# Patient Record
Sex: FEMALE | Race: WHITE | NOT HISPANIC OR LATINO | Employment: OTHER | URBAN - METROPOLITAN AREA
[De-identification: names, ages, dates, MRNs, and addresses within clinical notes are randomized per-mention and may not be internally consistent; named-entity substitution may affect disease eponyms.]

---

## 2017-01-02 ENCOUNTER — OFFICE VISIT - RIVER FALLS (OUTPATIENT)
Dept: FAMILY MEDICINE | Facility: CLINIC | Age: 52
End: 2017-01-02
Payer: COMMERCIAL

## 2017-01-02 ASSESSMENT — MIFFLIN-ST. JEOR: SCORE: 1207.08

## 2017-04-14 ENCOUNTER — OFFICE VISIT - RIVER FALLS (OUTPATIENT)
Dept: FAMILY MEDICINE | Facility: CLINIC | Age: 52
End: 2017-04-14
Payer: COMMERCIAL

## 2017-04-14 ASSESSMENT — MIFFLIN-ST. JEOR: SCORE: 1215.24

## 2018-04-23 ENCOUNTER — OFFICE VISIT - RIVER FALLS (OUTPATIENT)
Dept: FAMILY MEDICINE | Facility: CLINIC | Age: 53
End: 2018-04-23
Payer: COMMERCIAL

## 2018-04-23 ASSESSMENT — MIFFLIN-ST. JEOR: SCORE: 1246.99

## 2018-08-15 ENCOUNTER — OFFICE VISIT - RIVER FALLS (OUTPATIENT)
Dept: FAMILY MEDICINE | Facility: CLINIC | Age: 53
End: 2018-08-15
Payer: COMMERCIAL

## 2018-08-15 ASSESSMENT — MIFFLIN-ST. JEOR: SCORE: 1243.36

## 2018-08-18 ENCOUNTER — TRANSFERRED RECORDS (OUTPATIENT)
Dept: HEALTH INFORMATION MANAGEMENT | Facility: CLINIC | Age: 53
End: 2018-08-18

## 2018-08-18 LAB — HPV ABSTRACT: NORMAL

## 2018-09-04 ENCOUNTER — OFFICE VISIT - RIVER FALLS (OUTPATIENT)
Dept: FAMILY MEDICINE | Facility: CLINIC | Age: 53
End: 2018-09-04
Payer: COMMERCIAL

## 2018-09-04 ASSESSMENT — MIFFLIN-ST. JEOR: SCORE: 1247.9

## 2018-12-17 ENCOUNTER — OFFICE VISIT - RIVER FALLS (OUTPATIENT)
Dept: FAMILY MEDICINE | Facility: CLINIC | Age: 53
End: 2018-12-17
Payer: COMMERCIAL

## 2019-01-14 ENCOUNTER — OFFICE VISIT - RIVER FALLS (OUTPATIENT)
Dept: FAMILY MEDICINE | Facility: CLINIC | Age: 54
End: 2019-01-14
Payer: COMMERCIAL

## 2019-01-14 LAB
ALBUMIN UR-MCNC: NEGATIVE G/DL
APPEARANCE UR: CLEAR
BILIRUB UR QL STRIP: NEGATIVE
COLOR UR AUTO: YELLOW
GLUCOSE UR STRIP-MCNC: NEGATIVE MG/DL
HGB UR QL STRIP: NEGATIVE
KETONES UR STRIP-MCNC: NEGATIVE MG/DL
LEUKOCYTE ESTERASE UR QL STRIP: NEGATIVE
NITRATE UR QL: NEGATIVE
PH UR STRIP: 6 [PH]
SP GR UR STRIP: 1.02
UROBILINOGEN UR STRIP-MCNC: NORMAL MG/DL

## 2019-01-14 ASSESSMENT — MIFFLIN-ST. JEOR: SCORE: 1261.51

## 2019-02-07 ENCOUNTER — AMBULATORY - RIVER FALLS (OUTPATIENT)
Dept: FAMILY MEDICINE | Facility: CLINIC | Age: 54
End: 2019-02-07
Payer: COMMERCIAL

## 2019-06-04 ENCOUNTER — OFFICE VISIT - RIVER FALLS (OUTPATIENT)
Dept: FAMILY MEDICINE | Facility: CLINIC | Age: 54
End: 2019-06-04
Payer: COMMERCIAL

## 2019-06-04 LAB — DEPRECATED S PYO AG THROAT QL EIA: NEGATIVE

## 2019-06-04 ASSESSMENT — MIFFLIN-ST. JEOR: SCORE: 1247.9

## 2019-06-06 LAB — BACTERIA SPEC CULT: NORMAL

## 2019-06-21 ENCOUNTER — AMBULATORY - RIVER FALLS (OUTPATIENT)
Dept: FAMILY MEDICINE | Facility: CLINIC | Age: 54
End: 2019-06-21
Payer: COMMERCIAL

## 2019-07-06 ENCOUNTER — OFFICE VISIT - RIVER FALLS (OUTPATIENT)
Dept: FAMILY MEDICINE | Facility: CLINIC | Age: 54
End: 2019-07-06
Payer: COMMERCIAL

## 2019-08-19 ENCOUNTER — OFFICE VISIT - RIVER FALLS (OUTPATIENT)
Dept: FAMILY MEDICINE | Facility: CLINIC | Age: 54
End: 2019-08-19
Payer: COMMERCIAL

## 2019-08-19 ASSESSMENT — MIFFLIN-ST. JEOR: SCORE: 1247.9

## 2019-09-09 ENCOUNTER — OFFICE VISIT - RIVER FALLS (OUTPATIENT)
Dept: FAMILY MEDICINE | Facility: CLINIC | Age: 54
End: 2019-09-09
Payer: COMMERCIAL

## 2019-09-09 ASSESSMENT — MIFFLIN-ST. JEOR: SCORE: 1246.99

## 2020-01-29 ENCOUNTER — COMMUNICATION - RIVER FALLS (OUTPATIENT)
Dept: FAMILY MEDICINE | Facility: CLINIC | Age: 55
End: 2020-01-29
Payer: COMMERCIAL

## 2020-01-29 ENCOUNTER — OFFICE VISIT - RIVER FALLS (OUTPATIENT)
Dept: FAMILY MEDICINE | Facility: CLINIC | Age: 55
End: 2020-01-29
Payer: COMMERCIAL

## 2020-01-29 LAB — DEPRECATED S PYO AG THROAT QL EIA: NOT DETECTED

## 2020-01-29 ASSESSMENT — MIFFLIN-ST. JEOR: SCORE: 1265.14

## 2020-02-18 ENCOUNTER — OFFICE VISIT - RIVER FALLS (OUTPATIENT)
Dept: FAMILY MEDICINE | Facility: CLINIC | Age: 55
End: 2020-02-18
Payer: COMMERCIAL

## 2020-02-18 ASSESSMENT — MIFFLIN-ST. JEOR: SCORE: 1256.07

## 2020-02-19 ENCOUNTER — COMMUNICATION - RIVER FALLS (OUTPATIENT)
Dept: FAMILY MEDICINE | Facility: CLINIC | Age: 55
End: 2020-02-19
Payer: COMMERCIAL

## 2020-04-28 ENCOUNTER — AMBULATORY - RIVER FALLS (OUTPATIENT)
Dept: FAMILY MEDICINE | Facility: CLINIC | Age: 55
End: 2020-04-28
Payer: COMMERCIAL

## 2020-04-28 ENCOUNTER — OFFICE VISIT - RIVER FALLS (OUTPATIENT)
Dept: FAMILY MEDICINE | Facility: CLINIC | Age: 55
End: 2020-04-28
Payer: COMMERCIAL

## 2020-04-30 ENCOUNTER — COMMUNICATION - RIVER FALLS (OUTPATIENT)
Dept: FAMILY MEDICINE | Facility: CLINIC | Age: 55
End: 2020-04-30
Payer: COMMERCIAL

## 2020-04-30 LAB
ALBUMIN UR-MCNC: NEGATIVE G/DL
APPEARANCE UR: CLEAR
BACTERIA #/AREA URNS HPF: NORMAL /HPF
BACTERIA SPEC CULT: NORMAL
BILIRUB UR QL STRIP: NEGATIVE
COLOR UR AUTO: YELLOW
GLUCOSE UR STRIP-MCNC: NEGATIVE MG/DL
HGB UR QL STRIP: NEGATIVE
HYALINE CASTS #/AREA URNS LPF: NORMAL /LPF
KETONES UR STRIP-MCNC: NEGATIVE MG/DL
LEUKOCYTE ESTERASE UR QL STRIP: NEGATIVE
NITRATE UR QL: NEGATIVE
PH UR STRIP: 5.5 [PH] (ref 5–8)
RBC #/AREA URNS AUTO: NORMAL /HPF
SP GR UR STRIP: 1 (ref 1–1.03)
SQUAMOUS #/AREA URNS AUTO: NORMAL /HPF
WBC #/AREA URNS AUTO: NORMAL /HPF

## 2020-06-15 ENCOUNTER — OFFICE VISIT - RIVER FALLS (OUTPATIENT)
Dept: FAMILY MEDICINE | Facility: CLINIC | Age: 55
End: 2020-06-15
Payer: COMMERCIAL

## 2020-09-01 ENCOUNTER — OFFICE VISIT - RIVER FALLS (OUTPATIENT)
Dept: FAMILY MEDICINE | Facility: CLINIC | Age: 55
End: 2020-09-01
Payer: COMMERCIAL

## 2020-09-09 ENCOUNTER — OFFICE VISIT - RIVER FALLS (OUTPATIENT)
Dept: FAMILY MEDICINE | Facility: CLINIC | Age: 55
End: 2020-09-09
Payer: COMMERCIAL

## 2020-09-09 ENCOUNTER — AMBULATORY - RIVER FALLS (OUTPATIENT)
Dept: FAMILY MEDICINE | Facility: CLINIC | Age: 55
End: 2020-09-09
Payer: COMMERCIAL

## 2020-09-11 ENCOUNTER — COMMUNICATION - RIVER FALLS (OUTPATIENT)
Dept: FAMILY MEDICINE | Facility: CLINIC | Age: 55
End: 2020-09-11
Payer: COMMERCIAL

## 2020-09-15 ENCOUNTER — AMBULATORY - RIVER FALLS (OUTPATIENT)
Dept: FAMILY MEDICINE | Facility: CLINIC | Age: 55
End: 2020-09-15
Payer: COMMERCIAL

## 2020-09-16 ENCOUNTER — COMMUNICATION - RIVER FALLS (OUTPATIENT)
Dept: FAMILY MEDICINE | Facility: CLINIC | Age: 55
End: 2020-09-16
Payer: COMMERCIAL

## 2020-09-16 LAB
ERYTHROCYTE [DISTWIDTH] IN BLOOD BY AUTOMATED COUNT: 12.7 % (ref 11–15)
HCT VFR BLD AUTO: 38.9 % (ref 35–45)
HGB BLD-MCNC: 12.9 GM/DL (ref 11.7–15.5)
MCH RBC QN AUTO: 29.2 PG (ref 27–33)
MCHC RBC AUTO-ENTMCNC: 33.2 GM/DL (ref 32–36)
MCV RBC AUTO: 88 FL (ref 80–100)
PLATELET # BLD AUTO: 264 10*3/UL (ref 140–400)
PMV BLD: 10.1 FL (ref 7.5–12.5)
RBC # BLD AUTO: 4.42 10*6/UL (ref 3.8–5.1)
TSH SERPL DL<=0.005 MIU/L-ACNC: 1.66 MIU/L (ref 0.4–4.5)
WBC # BLD AUTO: 6.2 10*3/UL (ref 3.8–10.8)

## 2020-10-19 ENCOUNTER — OFFICE VISIT - RIVER FALLS (OUTPATIENT)
Dept: FAMILY MEDICINE | Facility: CLINIC | Age: 55
End: 2020-10-19
Payer: COMMERCIAL

## 2020-10-19 ASSESSMENT — MIFFLIN-ST. JEOR: SCORE: 1237.92

## 2020-12-08 ENCOUNTER — OFFICE VISIT - RIVER FALLS (OUTPATIENT)
Dept: FAMILY MEDICINE | Facility: CLINIC | Age: 55
End: 2020-12-08
Payer: COMMERCIAL

## 2020-12-08 ENCOUNTER — AMBULATORY - RIVER FALLS (OUTPATIENT)
Dept: FAMILY MEDICINE | Facility: CLINIC | Age: 55
End: 2020-12-08
Payer: COMMERCIAL

## 2020-12-11 LAB — SARS-COV-2 RNA RESP QL NAA+PROBE: NEGATIVE

## 2021-02-19 ENCOUNTER — OFFICE VISIT - RIVER FALLS (OUTPATIENT)
Dept: FAMILY MEDICINE | Facility: CLINIC | Age: 56
End: 2021-02-19
Payer: COMMERCIAL

## 2021-02-19 ASSESSMENT — MIFFLIN-ST. JEOR: SCORE: 1237.92

## 2021-03-01 ENCOUNTER — OFFICE VISIT - RIVER FALLS (OUTPATIENT)
Dept: FAMILY MEDICINE | Facility: CLINIC | Age: 56
End: 2021-03-01
Payer: COMMERCIAL

## 2021-03-01 ENCOUNTER — AMBULATORY - RIVER FALLS (OUTPATIENT)
Dept: FAMILY MEDICINE | Facility: CLINIC | Age: 56
End: 2021-03-01
Payer: COMMERCIAL

## 2021-03-01 LAB — DEPRECATED S PYO AG THROAT QL EIA: NOT DETECTED

## 2021-03-02 ENCOUNTER — AMBULATORY - HEALTHEAST (OUTPATIENT)
Dept: OTOLARYNGOLOGY | Facility: TELEHEALTH | Age: 56
End: 2021-03-02

## 2021-03-02 DIAGNOSIS — R09.A2 FOREIGN BODY SENSATION IN THROAT: ICD-10-CM

## 2021-03-03 LAB — SARS-COV-2 RNA RESP QL NAA+PROBE: NEGATIVE

## 2021-03-12 ENCOUNTER — OFFICE VISIT - RIVER FALLS (OUTPATIENT)
Dept: FAMILY MEDICINE | Facility: CLINIC | Age: 56
End: 2021-03-12
Payer: COMMERCIAL

## 2021-03-12 ENCOUNTER — OFFICE VISIT - HEALTHEAST (OUTPATIENT)
Dept: OTOLARYNGOLOGY | Facility: TELEHEALTH | Age: 56
End: 2021-03-12

## 2021-03-12 DIAGNOSIS — K21.9 GASTROESOPHAGEAL REFLUX DISEASE, UNSPECIFIED WHETHER ESOPHAGITIS PRESENT: ICD-10-CM

## 2021-03-23 ENCOUNTER — AMBULATORY - RIVER FALLS (OUTPATIENT)
Dept: FAMILY MEDICINE | Facility: CLINIC | Age: 56
End: 2021-03-23
Payer: COMMERCIAL

## 2021-03-24 ENCOUNTER — COMMUNICATION - RIVER FALLS (OUTPATIENT)
Dept: FAMILY MEDICINE | Facility: CLINIC | Age: 56
End: 2021-03-24
Payer: COMMERCIAL

## 2021-03-24 LAB
BUN SERPL-MCNC: 16 MG/DL (ref 7–25)
BUN/CREAT RATIO - HISTORICAL: NORMAL (ref 6–22)
CALCIUM SERPL-MCNC: 10.1 MG/DL (ref 8.6–10.4)
CHLORIDE BLD-SCNC: 101 MMOL/L (ref 98–110)
CHOLEST SERPL-MCNC: 311 MG/DL
CHOLEST/HDLC SERPL: 4.1 {RATIO}
CO2 SERPL-SCNC: 29 MMOL/L (ref 20–32)
CREAT SERPL-MCNC: 0.82 MG/DL (ref 0.5–1.05)
EGFRCR SERPLBLD CKD-EPI 2021: 80 ML/MIN/1.73M2
ERYTHROCYTE [DISTWIDTH] IN BLOOD BY AUTOMATED COUNT: 14.8 % (ref 11–15)
GLUCOSE BLD-MCNC: 97 MG/DL (ref 65–99)
HCT VFR BLD AUTO: 41.8 % (ref 35–45)
HDLC SERPL-MCNC: 76 MG/DL
HGB BLD-MCNC: 13.7 GM/DL (ref 11.7–15.5)
LDLC SERPL CALC-MCNC: 203 MG/DL
MCH RBC QN AUTO: 29 PG (ref 27–33)
MCHC RBC AUTO-ENTMCNC: 32.8 GM/DL (ref 32–36)
MCV RBC AUTO: 88.4 FL (ref 80–100)
NONHDLC SERPL-MCNC: 235 MG/DL
PLATELET # BLD AUTO: 282 10*3/UL (ref 140–400)
PMV BLD: 9.7 FL (ref 7.5–12.5)
POTASSIUM BLD-SCNC: 4.2 MMOL/L (ref 3.5–5.3)
RBC # BLD AUTO: 4.73 10*6/UL (ref 3.8–5.1)
SODIUM SERPL-SCNC: 136 MMOL/L (ref 135–146)
TRIGL SERPL-MCNC: 152 MG/DL
WBC # BLD AUTO: 5.2 10*3/UL (ref 3.8–10.8)

## 2021-03-29 ENCOUNTER — OFFICE VISIT - RIVER FALLS (OUTPATIENT)
Dept: FAMILY MEDICINE | Facility: CLINIC | Age: 56
End: 2021-03-29
Payer: COMMERCIAL

## 2021-03-29 ASSESSMENT — MIFFLIN-ST. JEOR: SCORE: 1256.63

## 2021-03-30 ENCOUNTER — RECORDS - HEALTHEAST (OUTPATIENT)
Dept: ADMINISTRATIVE | Facility: OTHER | Age: 56
End: 2021-03-30

## 2021-04-14 ENCOUNTER — HOSPITAL ENCOUNTER (OUTPATIENT)
Dept: CT IMAGING | Facility: CLINIC | Age: 56
Discharge: HOME | End: 2021-04-14

## 2021-04-14 DIAGNOSIS — E78.5 HYPERLIPIDEMIA: ICD-10-CM

## 2021-04-15 LAB
CV CALCIUM SCORE AGATSTON LM: 0
CV CALCIUM SCORING AGATSON LAD: 0
CV CALCIUM SCORING AGATSTON CX: 0
CV CALCIUM SCORING AGATSTON RCA: 0
CV CALCIUM SCORING AGATSTON TOTAL: 0

## 2021-05-27 VITALS — TEMPERATURE: 97.1 F

## 2021-06-15 NOTE — PROGRESS NOTES
HISTORY OF PRESENT ILLNESS  Asked to see by Dr. Yu for evaluation of sensation in the throat. Patient reports that she had a head cold. She felt like something stuck in the throat. Her face would turn beet red. She tried taking allergy medications without help. Benadryl cream helped the face. Her head cold is better but she still feels like there is something in her throat. She points to the center of the anterior neck in the area of the voice box as the location of the discomfort. She is swallowing normally. Patient does reports occasional heartburn. She does takes TUMS on occasion.    REVIEW OF SYSTEMS  Review of Systems: a 10-system review was performed. Pertinent positives are noted in the HPI and on a separate scanned document in the chart.    PMH, PSH, FH and SH has documented in the EHR.      EXAM    CONSTITUTIONAL  General Appearance:  Normal, well developed, well nourished, no obvious distress  Ability to Communicate:  communicates appropriately.    HEAD AND FACE  Appearance and Symmetry:  Normal, no scalp or facial scarring or suspicious lesions.  Paranasal sinuses tenderness:  Normal, Paranasal sinuses non tender    EYE  Normal external eye, conjunctiva, lids, cornea.     EARS  Clinical speech reception threshold:  Normal, able to hear normal speech.  Auricle:  Normal, Auricles without scars, lesions, masses.  External auditory canal:  Normal, External auditory canal normal.  Tympanic membrane:  Normal, Tympanic membranes normal without swelling or erythema.  Tympanic membrane mobility:  Normal, Normal tympanic membrane mobility.    NOSE (speculum or scope)  Architecture:  Normal, Grossly normal external nasal architecture with no masses or lesions.  Mucosa:  Normal mucosa, No polyps or masses.  Septum:  Normal, Septum non-obstructing.  Turbinates:  Normal, No turbinate abnormalities    ORAL CAVITY AND OROPHARYNX  Lips:  Normal.  Dental and gingiva:  Normal, No obvious dental or gingival  disease.  Mucosa:  Normal, Moist mucous membranes.  Tongue:  Normal, Tongue mobile with no mucosal abnormalities  Hard and soft palate:  Normal, Hard and soft palate without cleft or mucosal lesions.  Oral pharynx:  Normal, Posterior pharynx without lesions or remarkable asymmetry. Lymphoid follicle left posterior pharyngeal wall.  Saliva:  Normal, Clear saliva.  Masses:  Normal, No palpable masses or pathologically enlarged lymph nodes.    LARYNGOSCOPY  Risks and benefit of Flexible laryngeal endoscopy were discussed with the patient and they wished to proceed.  The nose was sprayed with 4% lidocaine / 1% phenylephrine.  After allowing adequate time for anesthesia the procedure was started.  Patient tolerated the procedure well.  See exam note for findings.    LARYNX AND HYPOPHARYNX (scope)  Voice Quality:  Normal voice quality.  Supra glottis:  Normal, No edema or erythema.  Epiglottis:  Normal, No epiglottic mass or mucosal lesions.  Pyriform sinuses:  Normal, Pyriform sinuses clear.  Vocal cords appearance:  Normal, Vocal cord motion symmetric.  Vocal cord motion:  Normal, Vocal cord motion symmetric  Endolarynx:  Normal, No Endolaryngeal mass or mucosal lesions.    NECK  Masses/lymph nodes:  Normal, No worrisome neck masses or lymph nodes.  Salivary glands:  Normal, Parotid and submandibular glands.  Trachea and larynx position:  Normal, Trachea and larynx midline.  Thyroid:  Normal, No thyroid abnormality.  Tenderness:  Normal, No cervical tenderness.  Suppleness:  Normal, Neck supple    CARDIOVASCULAR  Regular rate and rhythm.  No cyanosis, clubbing or edema.    PULSES  Carotid pulses normal    NEUROLOGICAL  Speech pattern:  Normal, Proasaic    RESPIRATORY  Symmetry and Respiratory effort:  Normal, Symmetric chest movement and expansion with no increased intercostal retractions or use of accessory muscles.     IMPRESSION  No evidence of mass or infection. It is possible that she may be experiencing  GERD.    RECOMMENDATION  At this point I provided reassurance. Consider otc omeprazole if symptoms worsen.    Ke Mullins MD

## 2021-08-10 ENCOUNTER — COMMUNICATION - RIVER FALLS (OUTPATIENT)
Dept: FAMILY MEDICINE | Facility: CLINIC | Age: 56
End: 2021-08-10
Payer: COMMERCIAL

## 2021-08-11 ENCOUNTER — AMBULATORY - RIVER FALLS (OUTPATIENT)
Dept: FAMILY MEDICINE | Facility: CLINIC | Age: 56
End: 2021-08-11
Payer: COMMERCIAL

## 2021-09-27 ENCOUNTER — OFFICE VISIT - RIVER FALLS (OUTPATIENT)
Dept: FAMILY MEDICINE | Facility: CLINIC | Age: 56
End: 2021-09-27
Payer: COMMERCIAL

## 2021-09-27 ASSESSMENT — MIFFLIN-ST. JEOR: SCORE: 1254.36

## 2021-09-30 ENCOUNTER — AMBULATORY - RIVER FALLS (OUTPATIENT)
Dept: FAMILY MEDICINE | Facility: CLINIC | Age: 56
End: 2021-09-30
Payer: COMMERCIAL

## 2021-09-30 ENCOUNTER — LAB REQUISITION (OUTPATIENT)
Dept: LAB | Facility: CLINIC | Age: 56
End: 2021-09-30
Payer: COMMERCIAL

## 2021-09-30 DIAGNOSIS — U07.1 COVID-19: ICD-10-CM

## 2021-09-30 PROCEDURE — U0003 INFECTIOUS AGENT DETECTION BY NUCLEIC ACID (DNA OR RNA); SEVERE ACUTE RESPIRATORY SYNDROME CORONAVIRUS 2 (SARS-COV-2) (CORONAVIRUS DISEASE [COVID-19]), AMPLIFIED PROBE TECHNIQUE, MAKING USE OF HIGH THROUGHPUT TECHNOLOGIES AS DESCRIBED BY CMS-2020-01-R: HCPCS | Mod: ORL | Performed by: FAMILY MEDICINE

## 2021-10-03 LAB — SARS-COV-2 RNA RESP QL NAA+PROBE: NOT DETECTED

## 2021-10-05 LAB — SARS-COV-2 RNA RESP QL NAA+PROBE: NEGATIVE

## 2021-10-25 ENCOUNTER — AMBULATORY - RIVER FALLS (OUTPATIENT)
Dept: FAMILY MEDICINE | Facility: CLINIC | Age: 56
End: 2021-10-25
Payer: COMMERCIAL

## 2021-10-26 ENCOUNTER — COMMUNICATION - RIVER FALLS (OUTPATIENT)
Dept: FAMILY MEDICINE | Facility: CLINIC | Age: 56
End: 2021-10-26
Payer: COMMERCIAL

## 2021-10-26 LAB
CHOLEST SERPL-MCNC: 323 MG/DL
CHOLEST/HDLC SERPL: 4.7 {RATIO}
HDLC SERPL-MCNC: 69 MG/DL
LDLC SERPL CALC-MCNC: 226 MG/DL
NONHDLC SERPL-MCNC: 254 MG/DL
TRIGL SERPL-MCNC: 133 MG/DL

## 2021-11-11 ENCOUNTER — AMBULATORY - RIVER FALLS (OUTPATIENT)
Dept: FAMILY MEDICINE | Facility: CLINIC | Age: 56
End: 2021-11-11
Payer: COMMERCIAL

## 2021-11-11 ENCOUNTER — LAB REQUISITION (OUTPATIENT)
Dept: LAB | Facility: CLINIC | Age: 56
End: 2021-11-11
Payer: COMMERCIAL

## 2021-11-11 ENCOUNTER — OFFICE VISIT - RIVER FALLS (OUTPATIENT)
Dept: FAMILY MEDICINE | Facility: CLINIC | Age: 56
End: 2021-11-11
Payer: COMMERCIAL

## 2021-11-11 DIAGNOSIS — U07.1 COVID-19: ICD-10-CM

## 2021-11-11 PROCEDURE — U0003 INFECTIOUS AGENT DETECTION BY NUCLEIC ACID (DNA OR RNA); SEVERE ACUTE RESPIRATORY SYNDROME CORONAVIRUS 2 (SARS-COV-2) (CORONAVIRUS DISEASE [COVID-19]), AMPLIFIED PROBE TECHNIQUE, MAKING USE OF HIGH THROUGHPUT TECHNOLOGIES AS DESCRIBED BY CMS-2020-01-R: HCPCS | Mod: ORL | Performed by: INTERNAL MEDICINE

## 2021-11-13 LAB — SARS-COV-2 RNA RESP QL NAA+PROBE: NOT DETECTED

## 2021-11-14 ENCOUNTER — TELEPHONE (OUTPATIENT)
Dept: NURSING | Facility: CLINIC | Age: 56
End: 2021-11-14
Payer: COMMERCIAL

## 2021-11-14 NOTE — TELEPHONE ENCOUNTER

## 2021-11-15 LAB — SARS-COV-2 RNA RESP QL NAA+PROBE: NEGATIVE

## 2021-11-27 ENCOUNTER — OFFICE VISIT - RIVER FALLS (OUTPATIENT)
Dept: FAMILY MEDICINE | Facility: CLINIC | Age: 56
End: 2021-11-27
Payer: COMMERCIAL

## 2021-11-27 ASSESSMENT — MIFFLIN-ST. JEOR: SCORE: 1233.5

## 2021-12-15 ENCOUNTER — OFFICE VISIT - RIVER FALLS (OUTPATIENT)
Dept: FAMILY MEDICINE | Facility: CLINIC | Age: 56
End: 2021-12-15
Payer: COMMERCIAL

## 2021-12-15 ASSESSMENT — MIFFLIN-ST. JEOR: SCORE: 1233.5

## 2021-12-20 ENCOUNTER — OFFICE VISIT - RIVER FALLS (OUTPATIENT)
Dept: FAMILY MEDICINE | Facility: CLINIC | Age: 56
End: 2021-12-20
Payer: COMMERCIAL

## 2021-12-22 ENCOUNTER — AMBULATORY - RIVER FALLS (OUTPATIENT)
Dept: FAMILY MEDICINE | Facility: CLINIC | Age: 56
End: 2021-12-22
Payer: COMMERCIAL

## 2021-12-22 ENCOUNTER — LAB REQUISITION (OUTPATIENT)
Dept: LAB | Facility: CLINIC | Age: 56
End: 2021-12-22
Payer: COMMERCIAL

## 2021-12-22 DIAGNOSIS — U07.1 COVID-19: ICD-10-CM

## 2021-12-22 PROCEDURE — U0005 INFEC AGEN DETEC AMPLI PROBE: HCPCS | Mod: ORL | Performed by: FAMILY MEDICINE

## 2021-12-23 LAB
SARS-COV-2 RNA RESP QL NAA+PROBE: NEGATIVE
SARS-COV-2 RNA RESP QL NAA+PROBE: NEGATIVE

## 2022-02-11 VITALS
HEIGHT: 63 IN | DIASTOLIC BLOOD PRESSURE: 60 MMHG | BODY MASS INDEX: 27.2 KG/M2 | HEART RATE: 64 BPM | WEIGHT: 153.5 LBS | TEMPERATURE: 97 F | SYSTOLIC BLOOD PRESSURE: 122 MMHG

## 2022-02-11 VITALS
SYSTOLIC BLOOD PRESSURE: 118 MMHG | WEIGHT: 149.2 LBS | HEIGHT: 62 IN | HEART RATE: 68 BPM | TEMPERATURE: 97.2 F | BODY MASS INDEX: 27.46 KG/M2 | DIASTOLIC BLOOD PRESSURE: 68 MMHG

## 2022-02-11 VITALS
SYSTOLIC BLOOD PRESSURE: 118 MMHG | HEART RATE: 66 BPM | HEART RATE: 64 BPM | TEMPERATURE: 96.4 F | WEIGHT: 161.2 LBS | SYSTOLIC BLOOD PRESSURE: 100 MMHG | DIASTOLIC BLOOD PRESSURE: 60 MMHG | BODY MASS INDEX: 29.66 KG/M2 | DIASTOLIC BLOOD PRESSURE: 70 MMHG | WEIGHT: 160.6 LBS | BODY MASS INDEX: 29.85 KG/M2 | TEMPERATURE: 96.7 F | HEIGHT: 62 IN

## 2022-02-11 VITALS
BODY MASS INDEX: 29.44 KG/M2 | WEIGHT: 160 LBS | HEIGHT: 62 IN | SYSTOLIC BLOOD PRESSURE: 126 MMHG | OXYGEN SATURATION: 99 % | WEIGHT: 162 LBS | SYSTOLIC BLOOD PRESSURE: 102 MMHG | HEART RATE: 70 BPM | DIASTOLIC BLOOD PRESSURE: 72 MMHG | BODY MASS INDEX: 29.81 KG/M2 | HEIGHT: 62 IN | TEMPERATURE: 97.7 F | DIASTOLIC BLOOD PRESSURE: 58 MMHG | HEART RATE: 68 BPM | TEMPERATURE: 97.1 F

## 2022-02-11 VITALS
TEMPERATURE: 98 F | BODY MASS INDEX: 27.79 KG/M2 | OXYGEN SATURATION: 97 % | HEIGHT: 62 IN | WEIGHT: 151 LBS | DIASTOLIC BLOOD PRESSURE: 66 MMHG | HEART RATE: 80 BPM | SYSTOLIC BLOOD PRESSURE: 108 MMHG

## 2022-02-11 VITALS
DIASTOLIC BLOOD PRESSURE: 72 MMHG | OXYGEN SATURATION: 99 % | DIASTOLIC BLOOD PRESSURE: 60 MMHG | OXYGEN SATURATION: 99 % | SYSTOLIC BLOOD PRESSURE: 102 MMHG | WEIGHT: 148.9 LBS | HEIGHT: 63 IN | BODY MASS INDEX: 27.06 KG/M2 | HEIGHT: 63 IN | BODY MASS INDEX: 26.17 KG/M2 | BODY MASS INDEX: 26.38 KG/M2 | HEIGHT: 63 IN | WEIGHT: 148.9 LBS | SYSTOLIC BLOOD PRESSURE: 110 MMHG | HEART RATE: 68 BPM | BODY MASS INDEX: 26.38 KG/M2 | HEART RATE: 62 BPM | HEIGHT: 63 IN | TEMPERATURE: 97.3 F

## 2022-02-11 VITALS
DIASTOLIC BLOOD PRESSURE: 70 MMHG | SYSTOLIC BLOOD PRESSURE: 110 MMHG | WEIGHT: 157.2 LBS | BODY MASS INDEX: 28.93 KG/M2 | HEIGHT: 62 IN | TEMPERATURE: 98 F | HEART RATE: 66 BPM

## 2022-02-11 VITALS
HEIGHT: 62 IN | BODY MASS INDEX: 28.71 KG/M2 | TEMPERATURE: 96.3 F | BODY MASS INDEX: 27.29 KG/M2 | WEIGHT: 154 LBS | HEIGHT: 63 IN | DIASTOLIC BLOOD PRESSURE: 74 MMHG | SYSTOLIC BLOOD PRESSURE: 99 MMHG | OXYGEN SATURATION: 100 % | DIASTOLIC BLOOD PRESSURE: 66 MMHG | HEART RATE: 60 BPM | SYSTOLIC BLOOD PRESSURE: 100 MMHG | TEMPERATURE: 97 F | WEIGHT: 156 LBS | HEART RATE: 66 BPM

## 2022-02-11 VITALS
SYSTOLIC BLOOD PRESSURE: 102 MMHG | DIASTOLIC BLOOD PRESSURE: 74 MMHG | BODY MASS INDEX: 29.11 KG/M2 | HEART RATE: 68 BPM | WEIGHT: 158.2 LBS | SYSTOLIC BLOOD PRESSURE: 126 MMHG | HEIGHT: 62 IN | HEART RATE: 76 BPM | DIASTOLIC BLOOD PRESSURE: 80 MMHG | HEIGHT: 62 IN | SYSTOLIC BLOOD PRESSURE: 120 MMHG | DIASTOLIC BLOOD PRESSURE: 60 MMHG | WEIGHT: 158.2 LBS | HEART RATE: 82 BPM | OXYGEN SATURATION: 98 % | BODY MASS INDEX: 29.11 KG/M2 | TEMPERATURE: 98 F

## 2022-02-11 VITALS
WEIGHT: 158.2 LBS | HEART RATE: 78 BPM | HEIGHT: 62 IN | BODY MASS INDEX: 29.11 KG/M2 | SYSTOLIC BLOOD PRESSURE: 118 MMHG | DIASTOLIC BLOOD PRESSURE: 66 MMHG | TEMPERATURE: 97 F

## 2022-02-11 VITALS
HEIGHT: 62 IN | SYSTOLIC BLOOD PRESSURE: 112 MMHG | HEIGHT: 62 IN | HEART RATE: 79 BPM | BODY MASS INDEX: 29.74 KG/M2 | TEMPERATURE: 96.8 F | BODY MASS INDEX: 28.71 KG/M2 | BODY MASS INDEX: 29.74 KG/M2 | HEIGHT: 62 IN | WEIGHT: 156 LBS | DIASTOLIC BLOOD PRESSURE: 84 MMHG

## 2022-02-11 VITALS
TEMPERATURE: 96.6 F | WEIGHT: 158 LBS | HEIGHT: 62 IN | SYSTOLIC BLOOD PRESSURE: 108 MMHG | HEART RATE: 72 BPM | DIASTOLIC BLOOD PRESSURE: 56 MMHG | BODY MASS INDEX: 29.08 KG/M2

## 2022-02-11 VITALS
WEIGHT: 158 LBS | BODY MASS INDEX: 29.08 KG/M2 | HEART RATE: 73 BPM | TEMPERATURE: 97.5 F | OXYGEN SATURATION: 99 % | HEIGHT: 62 IN

## 2022-02-11 VITALS — HEIGHT: 62 IN | BODY MASS INDEX: 29.41 KG/M2

## 2022-02-11 VITALS — BODY MASS INDEX: 29.41 KG/M2 | HEIGHT: 62 IN

## 2022-02-11 VITALS — HEIGHT: 62 IN | BODY MASS INDEX: 29.37 KG/M2

## 2022-02-16 NOTE — PROGRESS NOTES
Chief Complaint    Pt c/o migraine for about 2 weeks.  History of Present Illness      Chief complaint as above reviewed and confirmed with patient.  Pt presents to the clinic with concerns re: HA.  Has a hx of migraines.  Has had one for the last 1 week. Character is unchanged, throbbing, frontal, some occasional difficultying focusing vision but no aura.  No T/N/W.  No confusion, disorientation.  Some photophobia and phonophobia.  Typically takes OTC medication such as EM, ibuprofen or tylenol which has not been helpful.  In the past has had good results with Toradol IM.  moderate nausea today which is common as well.  HA more frequent recently with sleep distrubance and Hot flashes.  talked to her pcp about this at last wellness exam be did not want to purue hormonal tx at that time.  Review of Systems      Review of systems is negative with the exception of those noted in HPI          Physical Exam   Vitals & Measurements    T: 96.8  F (Tympanic)  HR: 79 (Peripheral)  BP: 112/84     HT: 61.5 in  WT: 156 lb  BMI: 29       General:  Alert and oriented, No acute distress.        Eye:  Pupils are equal, round and reactive to light, Extraocular movements are intact, Normal conjunctiva.        HENT:  Normocephalic, Tympanic membranes are clear, Normal hearing, Oral mucosa is moist, No pharyngeal erythema.        Neck:  Supple, Non-tender.        Respiratory:  Respirations are non-labored.        Cardiovascular:  Normal rate, Regular rhythm.        Neurologic:  Alert, Oriented, Muscular strength, sensation and DTR are symetrical and WNL B.  No focal deficits, Cranial Nerves II-XII are grossly intact,  [place] negative      Psychiatric:  Cooperative, Appropriate mood & affect, Normal judgment.          Assessment/Plan       Migraines (G43.909)         Toradol as ordered.  zofran 4 mg given in clinic as well.  Rest in quiet area, ice may be helpful.  FU with pcp for discussion of ongoing migraine tx when feeling better,  consider perimenopausal period to be contributing.         Ordered:          ketorolac, 60 mg, im, once, (Completed)          83912 therapeutic prophylactic/dx injection subq/im (Charge), Quantity: 1, Migraines           ketorolac tromethamine inj, 15 mg (Charge), Quantity: 4, Migraines                Orders:         ondansetron, = 1 tab(s) ( 4 mg ), Oral, q8 hrs, PRN: nausea, # 30 tab(s), 1 Refill(s), Type: Maintenance, Pharmacy: [facility], 1 tab(s) Oral q8 hrs,PRN:nausea, 61.5, in, 10/19/20 11:35:00 CDT, Height Camille..., (Ordered)  Patient Information     Name:VERO PAGAN      Address:       [address] [place] [id number]     Sex:Female     YOB: 1965     [phone]     Emergency Contact:GABRIELA PAGAN     MRN:[id number]     FIN:[id number]     Location:[facility]     Date of Service:10/19/2020      Primary Care Physician:       Gigi CHENG, Edith, [phone]       Edith Yu MD, [phone]      Attending Physician:       Sabina Ortiz PA-C, [phone]  Problem List/Past Medical History    Ongoing     Anemia     HLD (Hyperlipidemia)     Migraines     Obesity     Seasonal Allergies     Tobacco abuse       Comments: Quit smoking.    Historical     Pregnancy     Pregnancy  Procedure/Surgical History     Colonoscopy (03/11/2013)            Comments: Normal colonoscopy repeat in 10 years.     Esophagogastroduodenoscopy (03/11/2013)            Comments: Normal EGD no need for repeat.  Medications    Zofran 4 mg oral tablet, 4 mg= 1 tab(s), Oral, q8 hrs, PRN, 1 refills  Allergies    Iron (Swelling)    erythromycin (GI upset)    sulfa drug  Social History    Smoking Status     Never smoker     Alcohol - Denies Alcohol Use      Never     Employment/School      Employed     Exercise - Occasional exercise      Exercise frequency: 1-2 times/week. Exercise type: Walking.      Home/Environment      Marital status:  (Living together). Spouse/Partner name: Don. 2 children. Living situation: Home/Independent.     Nutrition/Health      Type of diet: Regular.     Other      First menses age 11. Menstrual duration 28 Days. No history of abnormal Pap.     Sexual      Sexually active: Yes. Sexual orientation: Heterosexual.     Substance Abuse - Denies Substance Abuse      Never     Tobacco - Past      Past, Cigarettes, 3 per day. 10 year(s).  Family History    Diabetes mellitus: Grandmother (M).    Diabetes mellitus type I: Daughter.    Heart disease: Grandmother (M).    Hypercholesterolemia: Mother and Father.    Hypertension: Mother.  Immunizations      Vaccine Date Status          pneumococcal (PPSV23) 08/15/2018 Given          tetanus/diphth/pertuss (Tdap) adult/adol 07/02/2014 Given          Td 08/29/2005 Recorded          Hep B 01/01/2001 Recorded          DTaP 12/01/1969 Recorded  Lab Results       Lab Results (Last 4 results within 90 days)        TSH: 1.66 mIU/L [0.4 mIU/L - 4.5 mIU/L] (09/15/20 14:13:00)       WBC: 6.2 [3.8  - 10.8] (09/15/20 14:13:00)       RBC: 4.42 [3.8  - 5.1] (09/15/20 14:13:00)       Hgb: 12.9 gm/dL [11.7 gm/dL - 15.5 gm/dL] (09/15/20 14:13:00)       Hct: 38.9 % [35 % - 45 %] (09/15/20 14:13:00)       MCV: 88 fL [80 fL - 100 fL] (09/15/20 14:13:00)       MCH: 29.2 pg [27 pg - 33 pg] (09/15/20 14:13:00)       MCHC: 33.2 gm/dL [32 gm/dL - 36 gm/dL] (09/15/20 14:13:00)       RDW: 12.7 % [11 % - 15 %] (09/15/20 14:13:00)       Platelet: 264 [140  - 400] (09/15/20 14:13:00)       MPV: 10.1 fL [7.5 fL - 12.5 fL] (09/15/20 14:13:00)       Coronavirus SARS-CoV-2 (COVID-19): NOT DETECTED (09/09/20 11:23:00)

## 2022-02-16 NOTE — PROGRESS NOTES
Patient:   VERO PAGAN            MRN: [id number]            FIN: [id number]               Age:   53 years     Sex:  Female     :  1965   Associated Diagnoses:   External otitis of right ear; Impacted cerumen   Author:   Orlando Daugherty MD      Visit Information      Date of Service: 2018 08:40 am  Performing Location: [facility]  Encounter#: [id number]      Primary Care Provider (PCP):  Edith Yu MD    NPI# [id number]      Referring Provider:  Orlando Daugherty MD    NPI# [id number]      Chief Complaint   2018 8:56 AM CDT     Left ear pain, plugged     Chief complaint and symptoms noted above confirmed with patient.      History of Present Illness             The patient presents with earache.  The location of the earache is the right ear.  The earache is characterized by pain.  The severity of the earache is moderate.  The earache is constant.  The earache has lasted for 2 day(s).  Exacerbating factors consist of none.  Relieving factors consist of heat application.  Associated symptoms consist of none.        Review of Systems   Constitutional:  Negative.    Ear/Nose/Mouth/Throat:  Negative except as documented in history of present illness.    Respiratory:  Negative.    Cardiovascular:  Negative.       Health Status   Allergies:    Allergic Reactions (Selected)  Severity Not Documented  Iron (Swelling)  Sulfa drug (No reactions were documented)  Nonallergic Reactions (Selected)  Severity Not Documented  Erythromycin (Gi upset)   Problem list:    All Problems (Selected)  Anemia / ICD-9-.9 / Confirmed  HLD (Hyperlipidemia) / ICD-9-.4 / Confirmed  Migraines / ICD-9-.90 / Confirmed  Seasonal Allergies / ICD-9-.9 / Confirmed      Histories   Past Medical History:    Active  HLD (Hyperlipidemia) (ICD-9-.4)  Migraines (ICD-9-.90)  Anemia (ICD-9-.9)  Seasonal Allergies (ICD-9-.9)  Resolved  Pregnancy (SNOMED CT  566760340):  Resolved on 8/2/1982 at 17 years.  Pregnancy (SNOMED CT 013156064):  Resolved on 7/6/1987 at 22 years.   Family History:    Diabetes mellitus  Grandmother (M)  Hypertension  Mother  Heart disease  Grandmother (M)  Diabetes mellitus type I  Daughter (Marixa)  Hypercholesterolemia  Mother  Father     Procedure history:    Colonoscopy (SNOMED CT 025060369) performed by Uriel Alfonso MD on 3/11/2013 at 47 Years.  Comments:  3/26/2013 12:38 PM Romeo Barth MA colonoscopy repeat in 10 years  Esophagogastroduodenoscopy (SNOMED CT 3652910498) performed by Uriel Alfonso MD on 3/11/2013 at 47 Years.  Comments:  3/26/2013 12:39 PM Romeo Barth MA  Normal EGD no need for repeat   Social History:        Alcohol Assessment: Denies Alcohol Use            Never      Tobacco Assessment: Past            Past, Cigarettes, 3 per day.  10 year(s).      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  Spouse/Partner name: Don.  2 children.  Living situation:               Home/Independent.                     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      Exercise and Physical Activity Assessment: Occasional exercise            Exercise frequency: 1-2 times/week.  Exercise type: Walking.      Sexual Assessment            Sexually active: Yes.  Sexual orientation: Heterosexual.      Other Assessment            First menses age 11.  Menstrual duration 28 Days.  No history of abnormal Pap.        Physical Examination   Vital Signs   9/4/2018 8:56 AM CDT Temperature Temporal 97.0 DegF  LOW     Peripheral Pulse Rate 78 bpm    Pulse Site Radial artery    HR Method Manual    Systolic Blood Pressure 118 mmHg    Diastolic Blood Pressure 66 mmHg    Mean Arterial Pressure 83 mmHg    BP Site Right arm    BP Method Manual      General:  Alert and oriented, No acute distress.    HENT:       Ear: Right ear, Canal ( Impacted with cerumen, puss in canal ).    Neck:  Supple.    Respiratory:  Lungs are clear to auscultation, Respirations are non-labored.    Cardiovascular:  Normal rate, Regular rhythm.       Procedure   Ear foreign body removal procedure   Date/ Time:  9/4/2018 9:15:00 AM.     Confirmed: patient.     Performed by: self.     Informed consent: Verbally obtained.     Indication: hearing disturbance.     Location: right ear.     Preparation and technique: positioned sitting upright, method including (cerumen loop, curette, irrigation with warm tap water, otologic syringe).     Results: foreign body removal complete.     Procedure tolerated: well.     No Complications.        Impression and Plan   Diagnosis     External otitis of right ear (OBD84-OR H60.91).     Course:  Progressing as expected.    Orders     Orders   Pharmacy:  hydrocortisone/neomycin/polymyxin B 1%-0.35%-10,000 units/mL otic solution (Prescribe): 2 drop(s), Ear-Right, QID, x 7 day(s), # 10 mL, 0 Refill(s), Type: Maintenance, Pharmacy: [id number], 2 drop(s) Ear-Right qid,x7 day(s).     Diagnosis     Impacted cerumen (VLE68-QD H61.23).     Orders     F/U  if not improving, sooner if getting worse.

## 2022-02-16 NOTE — TELEPHONE ENCOUNTER
---------------------  From: Omkar Fernandez PA-C   To: VERO PAGAN    Sent: 9/11/2020 6:13:35 AM CDT  Subject: General Message     Your Covid test was negative.      Results:  Date Result Name Value Ref Range   9/9/2020 11:23 AM Coronavirus SARS-CoV-2 (COVID-19) NOT DETECTED (NOT DETECTED - )Pt call for COVID result. Left detailed message on personalized vm with negative covid result.

## 2022-02-16 NOTE — NURSING NOTE
Comprehensive Intake Entered On:  9/27/2021 3:26 PM CDT    Performed On:  9/27/2021 3:20 PM CDT by Morenita Nunez               Summary   Chief Complaint :   Lt ear pain and sore throat x2 weeks, no known exposure to COVID.   Menstrual Status :   Menarcheal   Weight Measured :   153.5 lb(Converted to: 153 lb 8 oz, 69.626 kg)    Height Measured :   63.25 in(Converted to: 5 ft 3 in, 160.65 cm)    Body Mass Index :   26.97 kg/m2 (HI)    Body Surface Area :   1.76 m2   Systolic Blood Pressure :   122 mmHg   Diastolic Blood Pressure :   60 mmHg   Mean Arterial Pressure :   81 mmHg   Peripheral Pulse Rate :   64 bpm   BP Site :   Right arm   Pulse Site :   Radial artery   BP Method :   Manual   HR Method :   Manual   Temperature Tympanic :   97.0 DegF(Converted to: 36.1 DegC)  (LOW)    Morenita Nunez - 9/27/2021 3:20 PM CDT   Health Status   Allergies Verified? :   Yes   Medication History Verified? :   Yes   Immunizations Current :   Yes   Medical History Verified? :   Yes   Pre-Visit Planning Status :   Completed   Tobacco Use? :   Former smoker   Morenita Nunez - 9/27/2021 3:20 PM CDT   Consents   Consent for Immunization Exchange :   Consent Granted   Consent for Immunizations to Providers :   Consent Granted   Morenita Nunez - 9/27/2021 3:20 PM CDT   Meds / Allergies   (As Of: 9/27/2021 3:26:03 PM CDT)   Allergies (Active)   erythromycin  Estimated Onset Date:   Unspecified ; Reactions:   GI upset ; Created By:   Maylin Banks; Reaction Status:   Active ; Category:   Drug ; Substance:   erythromycin ; Type:   Sensitivity ; Updated By:   Maylin Banks; Reviewed Date:   9/27/2021 3:24 PM CDT      Iron  Estimated Onset Date:   Unspecified ; Reactions:   Swelling ; Created By:   Brenda Almeida CMA; Reaction Status:   Active ; Category:   Drug ; Substance:   Iron ; Type:   Allergy ; Updated By:   Brenda Almeida CMA; Reviewed Date:   9/27/2021 3:24 PM CDT      sulfa drug  Estimated Onset Date:   Unspecified  ; Created By:   Shandra Marshall; Reaction Status:   Active ; Category:   Drug ; Substance:   sulfa drug ; Type:   Allergy ; Updated By:   Shandra Marshall; Reviewed Date:   9/27/2021 3:24 PM CDT        Medication List   (As Of: 9/27/2021 3:26:03 PM CDT)   Prescription/Discharge Order    ondansetron  :   ondansetron ; Status:   Prescribed ; Ordered As Mnemonic:   Zofran ODT 4 mg oral tablet, disintegrating ; Simple Display Line:   4 mg, 1 tab(s), Oral, tid, PRN: nausea, 9 tab(s), 5 Refill(s) ; Ordering Provider:   Edith Yu MD; Catalog Code:   ondansetron ; Order Dt/Tm:   3/29/2021 8:58:42 AM CDT          pantoprazole  :   pantoprazole ; Status:   Prescribed ; Ordered As Mnemonic:   pantoprazole 40 mg oral delayed release tablet ; Simple Display Line:   40 mg, 1 tab(s), Oral, daily, 30 tab(s), 2 Refill(s) ; Ordering Provider:   Edith Yu MD; Catalog Code:   pantoprazole ; Order Dt/Tm:   3/29/2021 8:57:52 AM CDT            Social History   Social History   (As Of: 9/27/2021 3:26:03 PM CDT)   Alcohol:        Never   (Last Updated: 5/22/2014 1:48:47 PM CDT by Mara Wesley)          Tobacco:        Former smoker, quit more than 30 days ago   (Last Updated: 12/8/2020 11:19:16 AM CST by Chichi Ceja CMA)          Electronic Cigarette/Vaping:        Electronic Cigarette Use: Never.   (Last Updated: 3/30/2021 2:56:13 PM CDT by Jessi Christensen)          Substance Abuse:        Never   (Last Updated: 5/22/2014 1:48:41 PM CDT by Mara Wesley)          Home/Environment:        Marital status:  (Living together).  Spouse/Partner name: Don.  2 children.  Living situation: Home/Independent.   Comments:  1/19/2012 9:52 AM - Nasrin Machado LPN: Adult children   (Last Updated: 3/30/2021 2:56:29 PM CDT by Jessi Christensen)          Nutrition/Health:        Type of diet: Regular.   (Last Updated: 3/30/2021 2:56:33 PM CDT by Jessi Christensen)          Exercise:        Exercise frequency: 1-2 times/week.  Exercise type:  Walking.   (Last Updated: 3/1/2012 11:10:23 AM CST by Brenda Cook)          Sexual:        Sexually active: Yes.  Sexual orientation: Heterosexual.   (Last Updated: 5/22/2014 1:49:46 PM CDT by Mara Wesley)          Other:        First menses age 11.  Menstrual duration 28 Days.  No history of abnormal Pap.   (Last Updated: 5/22/2014 1:46:33 PM CDT by Mara Wesley)

## 2022-02-16 NOTE — PROGRESS NOTES
Patient:   VERO PAGAN            MRN: [id number]            FIN: [id number]               Age:   56 years     Sex:  Female     :  1965   Associated Diagnoses:   None   Author:   Harriet Thompson MD       -   Today's date:    2021.        -   To whom it may concern:        This patient Was seen in my office on  2021.  .     Please excuse him/ her from work, today.  He/ she may return to work, without restrictions, when she is feeling better.  Likely in 1-2 days..      -   Best regards,   Harriet Thompson MD

## 2022-02-16 NOTE — PROGRESS NOTES
Patient:   VERO PAGAN            MRN: [id number]            FIN: [id number]               Age:   54 years     Sex:  Female     :  1965   Associated Diagnoses:   Cold virus   Author:   Orlando Daugherty MD      Visit Information      Date of Service: 2019 11:38 am  Performing Location: [facility]  Encounter#: [id number]      Primary Care Provider (PCP):  Edith Yu MD    NPI# [id number]      Referring Provider:  Orlando Daugherty MD    NPI# [id number]      Chief Complaint   2019 11:40 AM CDT    Sore throat, sinus pressure     Upper Respiratory Symptoms      History of Present Illness             The patient presents with symptoms of an upper respiratory infection.  The symptoms of the upper respiratory infection are described as rhinorrhea, sore throat and nasal congestion.  The severity of the symptoms associated to the upper respiratory infection is moderate.  The timing/course of upper respiratory infection symptoms is constant.  Exacerbating factors consist of none.  Relieving factors consist of rest and fluids.  Associated symptoms consist of none.        Review of Systems   Constitutional:  Fatigue.    Ear/Nose/Mouth/Throat:  Nasal congestion.    Respiratory:  Cough, No shortness of breath, No sputum production, No wheezing.    Integumentary:  No rash.       Health Status   Allergies:    Allergic Reactions (Selected)  Severity Not Documented  Iron (Swelling)  Sulfa drug (No reactions were documented)  Nonallergic Reactions (Selected)  Severity Not Documented  Erythromycin (Gi upset)   Medications:  (Selected)      Problem list:    All Problems  Anemia / ICD-9-.9 / Confirmed  HLD (Hyperlipidemia) / ICD-9-.4 / Confirmed  Migraines / ICD-9-.90 / Confirmed  Seasonal Allergies / ICD-9-.9 / Confirmed  Inactive: Tobacco abuse / ICD-9-.1  Resolved: Pregnancy / SNOMED CT 946388834  Resolved: Pregnancy / SNOMED CT 356939352      Histories    Past Medical History:    Active  HLD (Hyperlipidemia) (ICD-9-.4)  Migraines (ICD-9-.90)  Anemia (ICD-9-.9)  Seasonal Allergies (ICD-9-.9)  Resolved  Pregnancy (SNOMED CT 801447406):  Resolved on 8/2/1982 at 17 years.  Pregnancy (SNOMED CT 137361885):  Resolved on 7/6/1987 at 22 years.   Family History:    Diabetes mellitus  Grandmother (M)  Hypertension  Mother  Heart disease  Grandmother (M)  Diabetes mellitus type I  Daughter (Marixa)  Hypercholesterolemia  Mother  Father     Procedure history:    Colonoscopy (SNOMED CT 972505969) performed by Uriel Alfonso MD on 3/11/2013 at 47 Years.  Comments:  3/26/2013 12:38 PM Romeo Kimbrough MA  Normal colonoscopy repeat in 10 years  Esophagogastroduodenoscopy (SNOMED CT 6428482491) performed by Uriel Alfonso MD on 3/11/2013 at 47 Years.  Comments:  3/26/2013 12:39 PM Romeo Kimbrough MA  Normal EGD no need for repeat   Social History:        Alcohol Assessment: Denies Alcohol Use            Never      Tobacco Assessment: Past            Past, Cigarettes, 3 per day.  10 year(s).      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  Spouse/Partner name: Don.  2 children.  Living situation:               Home/Independent.                     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      Exercise and Physical Activity Assessment: Occasional exercise            Exercise frequency: 1-2 times/week.  Exercise type: Walking.      Sexual Assessment            Sexually active: Yes.  Sexual orientation: Heterosexual.      Other  Assessment            First menses age 11.  Menstrual duration 28 Days.  No history of abnormal Pap.,        Alcohol Assessment: Denies Alcohol Use            Never      Tobacco Assessment: Past            Past, Cigarettes, 3 per day.  10 year(s).      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  Spouse/Partner name: Don.  2 children.  Living situation:               Home/Independent.                     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      Exercise and Physical Activity Assessment: Occasional exercise            Exercise frequency: 1-2 times/week.  Exercise type: Walking.      Sexual Assessment            Sexually active: Yes.  Sexual orientation: Heterosexual.      Other Assessment            First menses age 11.  Menstrual duration 28 Days.  No history of abnormal Pap.      Physical Examination   Vital Signs   6/4/2019 11:40 AM CDT Temperature Tympanic 98.0 DegF    Peripheral Pulse Rate 76 bpm    Pulse Site Radial artery    HR Method Manual    Systolic Blood Pressure 126 mmHg    Diastolic Blood Pressure 74 mmHg    Mean Arterial Pressure 91 mmHg    BP Site Left arm    BP Method Manual      Measurements from flowsheet : Measurements   6/4/2019 11:40 AM CDT Height Measured - Standard 61.5 in    Weight Measured - Standard 158.2 lb    BSA 1.76 m2    Body Mass Index 29.4 kg/m2  HI      General:  Alert and oriented, No acute distress.    Eye:  Normal conjunctiva.    HENT:  Normocephalic, Tympanic membranes are clear, Oral mucosa is moist.    Neck:  Supple, Non-tender, No lymphadenopathy.     Respiratory:  Lungs are clear to auscultation, Breath sounds are equal, Symmetrical chest wall expansion.         Respirations: Are within normal limits.         Pattern: Regular.         Breath sounds: Bilateral, Within normal limits.    Cardiovascular:  Normal rate, Regular rhythm, No murmur, Good pulses equal in all extremities, Normal peripheral perfusion, No edema.    Gastrointestinal:  Soft, Non-tender.    Integumentary:  Warm, No rash.    Neurologic:  Alert, Oriented.    Psychiatric:  Cooperative, Appropriate mood & affect.       Review / Management   Results review:  Lab results: 6/4/2019 11:59 AM CDT    Rapid Strep POC           Negative  .    Course:  Progressing as expected.       Impression and Plan   Diagnosis     Cold virus ([id number]-ZQ J00).     Course:  Progressing as expected.    Orders     Return to clinic or ER if no improvements or worsening signs symptoms.     Symptomatic care guidelines reviewed.     Dx and Plan   Counseled:  Regarding diagnosis (Reviewed the viral nature of this illness and recommended rest and fluids. Discussed the natural history of viral URI, anticipatory guidance).    Patient Instructions:  Launch follow-up (if licensed).

## 2022-02-16 NOTE — PROGRESS NOTES
Patient:   VERO PAGAN            MRN: [id number]            FIN: [id number]               Age:   54 years     Sex:  Female     :  1965   Associated Diagnoses:   Acute pansinusitis   Author:   Romario Brizuela MD      Impression and Plan   Diagnosis     Acute pansinusitis (PXL72-UZ J01.40).     Course:  Worsening.    Orders     Orders   Charges (Evaluation and Management):  98057 office outpatient visit 15 minutes (Charge) (Order): Quantity: 1, Acute pansinusitis.     Orders (Selected)   Prescriptions  Prescribed  Augmentin 875 mg-125 mg oral tablet: = 1 tab(s), po, bidac, # 20 tab(s), 0 Refill(s), Type: Maintenance, Pharmacy: [facility], 1 tab(s) Oral bidac,x10 day(s).        Visit Information      Date of Service: 2019 10:48 am  Performing Location: [facility]  Encounter#: [id number]      Primary Care Provider (PCP):  Edith Yu MD    NPI# [id number]   Visit type:  New symptom.    Accompanied by:  No one.    Source of history:  Self.    History limitation:  None.       Chief Complaint   Chief complaint discussed and confirmed correct.     2019 10:52 AM CDT    Pt here for HA, congestion and ST        History of Present Illness             The patient presents with sinus problem.  The sinus problem is located in the frontal sinus, ethmoid sinus and maxillary sinus.  The sinus problem is characterized by nasal congestion, headache and facial pain.  The severity of the sinus problem is severe.  The sinus problem is constant.  The sinus problem has lasted for 5 day(s).  The context of the sinus problem: occurred in association with illness.  Associated symptoms consist of cough, ear pain, sore throat, denies fever, denies dizziness, denies eye pain, denies neck pain and denies photophobia.        Review of Systems   Constitutional:  Negative.    Eye:  Negative.    Ear/Nose/Mouth/Throat:  Nasal congestion, Sinus pain.    Respiratory:  Negative.    Cardiovascular:   Negative.    Gastrointestinal:  Negative.    Genitourinary:  Negative.    Hematology/Lymphatics:  Negative.    Endocrine:  Negative.    Immunologic:  Negative.    Musculoskeletal:  Negative.    Integumentary:  Negative.    Neurologic:  Negative.    Psychiatric:  Negative.    All other systems reviewed and negative      Health Status   Allergies:    Allergic Reactions (Selected)  Severity Not Documented  Iron (Swelling)  Sulfa drug (No reactions were documented)  Nonallergic Reactions (Selected)  Severity Not Documented  Erythromycin (Gi upset)   Medications:  (Selected)   Prescriptions  Prescribed  Augmentin 875 mg-125 mg oral tablet: = 1 tab(s), po, bidac, # 20 tab(s), 0 Refill(s), Type: Maintenance, Pharmacy: [facility], 1 tab(s) Oral bidac,x10 day(s)  sertraline 25 mg oral tablet: = 1 tab(s) ( 25 mg ), Oral, daily, # 30 tab(s), 5 Refill(s), Type: Maintenance, Pharmacy: [id number], 1 tab(s) Oral daily   Problem list:    All Problems  Anemia / ICD-9-.9 / Confirmed  HLD (Hyperlipidemia) / ICD-9-.4 / Confirmed  Migraines / ICD-9-.90 / Confirmed  Seasonal Allergies / ICD-9-.9 / Confirmed  Inactive: Tobacco abuse / ICD-9-.1  Resolved: Pregnancy / SNOMED CT 984576422  Resolved: Pregnancy / SNOMED CT 249816736      Histories   Past Medical History:    Active  HLD (Hyperlipidemia) (272.4)  Migraines (346.90)  Anemia (285.9)  Seasonal Allergies (477.9)  Resolved  Pregnancy (305035734):  Resolved on 8/2/1982 at 17 years.  Pregnancy (208914871):  Resolved on 7/6/1987 at 22 years.   Family History:    Diabetes mellitus  Grandmother (M)  Hypertension  Mother  Heart disease  Grandmother (M)  Diabetes mellitus type I  Daughter (Marixa)  Hypercholesterolemia  Mother  Father     Procedure history:    Colonoscopy (SNOMED CT 099189627) performed by Uriel Alfonso MD on 3/11/2013 at 47 Years.  Comments:  3/26/2013 12:38 PM PIEDADT - Romeo Rodgers MA  Normal colonoscopy repeat in 10  years  Esophagogastroduodenoscopy (SNOMED CT 5058056070) performed by Uriel Alfonso MD on 3/11/2013 at 47 Years.  Comments:  3/26/2013 12:39 PM CDT - Romeo Rodgers MA  Normal EGD no need for repeat   Social History:        Alcohol Assessment: Denies Alcohol Use            Never      Tobacco Assessment: Past            Past, Cigarettes, 3 per day.  10 year(s).      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  Spouse/Partner name: Don.  2 children.  Living situation:               Home/Independent.                     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      Exercise and Physical Activity Assessment: Occasional exercise            Exercise frequency: 1-2 times/week.  Exercise type: Walking.      Sexual Assessment            Sexually active: Yes.  Sexual orientation: Heterosexual.      Other Assessment            First menses age 11.  Menstrual duration 28 Days.  No history of abnormal Pap.        Physical Examination   Vital signs reviewed  and within acceptable limits    Vital Signs   9/9/2019 10:52 AM CDT Temperature Tympanic 97.5 DegF  LOW    Peripheral Pulse Rate 73 bpm    Oxygen Saturation 99 %      Measurements from flowsheet : Measurements   9/9/2019 10:52 AM CDT Height Measured - Standard 61.5 in    Weight Measured - Standard 158 lb    BSA 1.76 m2    Body Mass Index 29.37 kg/m2  HI      General:  Alert and oriented, No acute distress.    Eye:  Pupils are equal, round and reactive to light, Extraocular movements are intact, Normal conjunctiva.    HENT:  Normocephalic, Tympanic membranes  are clear, Normal hearing, Oral mucosa is moist, No pharyngeal erythema.         Sinus: Bilateral, Ethmoid sinus, Frontal sinus, Maxillary sinus, Tenderness.    Neck:  Supple, Non-tender, No lymphadenopathy.    Respiratory:  Lungs are clear to auscultation, Respirations are non-labored, Breath sounds are equal.    Cardiovascular:  Normal rate, Regular rhythm, No murmur, Normal peripheral perfusion, No edema.    Integumentary:  Warm, Dry, Pink.    Psychiatric:  Cooperative, Appropriate mood & affect, Normal judgment.

## 2022-02-16 NOTE — NURSING NOTE
Comprehensive Intake Entered On:  10/19/2020 11:37 AM CDT    Performed On:  10/19/2020 11:35 AM CDT by Sushila Christina               Summary   Chief Complaint :   Pt c/o migraine for about 2 weeks.   Menstrual Status :   Menarcheal   Weight Measured :   156 lb(Converted to: 156 lb 0 oz, 70.760 kg)    Height Measured :   61.5 in(Converted to: 5 ft 1 in, 156.21 cm)    Body Mass Index :   29 kg/m2 (HI)    Body Surface Area :   1.75 m2   Systolic Blood Pressure :   112 mmHg   Diastolic Blood Pressure :   84 mmHg (HI)    Mean Arterial Pressure :   93 mmHg   Peripheral Pulse Rate :   79 bpm   Temperature Tympanic :   96.8 DegF(Converted to: 36.0 DegC)  (LOW)    Sushlia Christina - 10/19/2020 11:35 AM CDT   Health Status   Allergies Verified? :   Yes   Medication History Verified? :   Yes   Immunizations Current :   Yes   Medical History Verified? :   Yes   Pre-Visit Planning Status :   Completed   Tobacco Use? :   Never smoker   Sushila Christina - 10/19/2020 11:35 AM CDT   Meds / Allergies   (As Of: 10/19/2020 11:37:26 AM CDT)   Allergies (Active)   erythromycin  Estimated Onset Date:   Unspecified ; Reactions:   GI upset ; Created By:   Maylin Banks; Reaction Status:   Active ; Category:   Drug ; Substance:   erythromycin ; Type:   Sensitivity ; Updated By:   Maylin Banks; Reviewed Date:   10/19/2020 11:36 AM CDT      Iron  Estimated Onset Date:   Unspecified ; Reactions:   Swelling ; Created By:   Brenda Almeida CMA; Reaction Status:   Active ; Category:   Drug ; Substance:   Iron ; Type:   Allergy ; Updated By:   Brenda Almeida CMA; Reviewed Date:   10/19/2020 11:36 AM CDT      sulfa drug  Estimated Onset Date:   Unspecified ; Created By:   Shandra Marshall; Reaction Status:   Active ; Category:   Drug ; Substance:   sulfa drug ; Type:   Allergy ; Updated By:   Shandra Marshall; Reviewed Date:   10/19/2020 11:36 AM CDT        Medication List   (As Of: 10/19/2020 11:37:26 AM CDT)        ID Risk Screen    Recent Travel History :   No recent travel   Family Member Travel History :   No recent travel   Other Exposure to Infectious Disease :   Unknown   Sushila Christina - 10/19/2020 11:35 AM CDT

## 2022-02-16 NOTE — TELEPHONE ENCOUNTER
---------------------  From: Martha Bettencourt CMA   Sent: 2/19/2021 2:58:25 PM CST  Subject: Ondansetron ODT 8mg     Patient was given Ondansetron ODT 8mg in clinic at 2:55pm. per JAVIER  Lot #[id number]  EXP: 07/2021

## 2022-02-16 NOTE — NURSING NOTE
Comprehensive Intake Entered On:  1/14/2019 9:25 AM CST    Performed On:  1/14/2019 9:18 AM CST by Otilia Delgado CMA               Summary   Chief Complaint :   sore throat; x3 weeks; pain with urination    Menstrual Status :   Menarcheal   Weight Measured :   161.2 lb(Converted to: 161 lb 3 oz, 73.12 kg)    Height Measured :   61.5 in(Converted to: 5 ft 1 in, 156.21 cm)    Body Mass Index :   29.96 kg/m2 (HI)    Body Surface Area :   1.78 m2   Systolic Blood Pressure :   118 mmHg   Diastolic Blood Pressure :   70 mmHg   Mean Arterial Pressure :   86 mmHg   Peripheral Pulse Rate :   66 bpm   BP Site :   Right arm   Pulse Site :   Radial artery   BP Method :   Manual   HR Method :   Manual   Temperature Tympanic :   96.7 DegF(Converted to: 35.9 DegC)  (LOW)    Otilia Delgado CMA - 1/14/2019 9:18 AM CST   Health Status   Allergies Verified? :   Yes   Medication History Verified? :   Yes   Immunizations Current :   Yes   Medical History Verified? :   Yes   Pre-Visit Planning Status :   Completed   Tobacco Use? :   Former smoker   Otilia Delgado CMA - 1/14/2019 9:18 AM CST   Consents   Consent for Immunization Exchange :   Consent Granted   Consent for Immunizations to Providers :   Consent Granted   Otilia Delgado CMA - 1/14/2019 9:18 AM CST   Meds / Allergies   (As Of: 1/14/2019 9:25:04 AM CST)   Allergies (Active)   erythromycin  Estimated Onset Date:   Unspecified ; Reactions:   GI upset ; Created By:   Maylin Banks; Reaction Status:   Active ; Category:   Drug ; Substance:   erythromycin ; Type:   Sensitivity ; Updated By:   Maylin Banks; Reviewed Date:   1/14/2019 9:22 AM CST      Iron  Estimated Onset Date:   Unspecified ; Reactions:   Swelling ; Created By:   Brenda Almeida CMA; Reaction Status:   Active ; Category:   Drug ; Substance:   Iron ; Type:   Allergy ; Updated By:   Brenda Almeida CMA; Reviewed Date:   1/14/2019 9:22 AM CST      sulfa drug  Estimated Onset Date:   Unspecified ; Created By:   Rolando  Shandra; Reaction Status:   Active ; Category:   Drug ; Substance:   sulfa drug ; Type:   Allergy ; Updated By:   Shandra Marshall; Reviewed Date:   1/14/2019 9:22 AM CST        Medication List   (As Of: 1/14/2019 9:25:04 AM [facility])   Prescription/Discharge Order    hydrocortisone topical  :   hydrocortisone topical ; Status:   Prescribed ; Ordered As Mnemonic:   hydrocortisone 2.5% topical cream ; Simple Display Line:   1 yolanda, TOP, TID, 30 g, 0 Refill(s) ; Ordering Provider:   Omkar Fernandez PA-C; Catalog Code:   hydrocortisone topical ; Order Dt/Tm:   12/17/2018 8:27:28 AM          hydrocortisone/neomycin/polymyxin B otic  :   hydrocortisone/neomycin/polymyxin B otic ; Status:   Processing ; Ordered As Mnemonic:   hydrocortisone/neomycin/polymyxin B 1%-0.35%-10,000 units/mL otic solution ; Ordering Provider:   Orlando Daugherty MD; Action Display:   Complete ; Catalog Code:   hydrocortisone/neomycin/polymyxin B otic ; Order Dt/Tm:   1/14/2019 9:22:52 AM

## 2022-02-16 NOTE — PROGRESS NOTES
Patient:   VERO PAGAN            MRN: [id number]            FIN: [id number]               Age:   54 years     Sex:  Female     :  1965   Associated Diagnoses:   Migraines; Hot flashes due to menopause   Author:   Edith Yu MD      Chief Complaint   2019 2:49 PM CDT    migrianes and hot flashes      Interval History   patient noting worsening of migraines along with hot flashes for the past month  migraines are daily, notes aura, headache is present upon awakening, feels foggy, vision feels blurry, feels nausea but has not vomited, entire head but left is worse than right, can be throbbing, use ibuprofen and ice pack which sometimes helps and sometimes no better  also with continuous hot flashes that have been on and off, worst at night, keeping her up and wakes her up, sometimes loses sleep         Health Status   Allergies:    Allergic Reactions (All)  Severity Not Documented  Iron (Swelling)  Sulfa drug (No reactions were documented)  Nonallergic Reactions (All)  Severity Not Documented  Erythromycin (Gi upset)   Problem list:    All Problems (Selected)  Anemia / ICD-9-.9 / Confirmed  HLD (Hyperlipidemia) / ICD-9-.4 / Confirmed  Migraines / ICD-9-.90 / Confirmed  Seasonal Allergies / ICD-9-.9 / Confirmed      Histories   Past Medical History:    Active  HLD (Hyperlipidemia) (ICD-9-.4)  Migraines (ICD-9-.90)  Anemia (ICD-9-.9)  Seasonal Allergies (ICD-9-.9)  Resolved  Pregnancy (SNOMED CT 329584853):  Resolved on 1982 at 17 years.  Pregnancy (SNOMED CT 491899100):  Resolved on 1987 at 22 years.   Family History:    Diabetes mellitus  Grandmother (M)  Hypertension  Mother  Heart disease  Grandmother (M)  Diabetes mellitus type I  Daughter (Marixa)  Hypercholesterolemia  Mother  Father     Procedure history:    Colonoscopy (841793743) on 3/11/2013 at 47 Years.  Comments:  3/26/2013 12:38 PM CDT - Romeo Rodgers MA  Normal colonoscopy  repeat in 10 years  Esophagogastroduodenoscopy (9921567618) on 3/11/2013 at 47 Years.  Comments:  3/26/2013 12:39 PM CDT - Romeo Rodgers MA  Normal EGD no need for repeat      Physical Examination   Vital Signs   8/19/2019 2:49 PM CDT Peripheral Pulse Rate 82 bpm    Systolic Blood Pressure 102 mmHg    Diastolic Blood Pressure 60 mmHg    Mean Arterial Pressure 74 mmHg      Measurements from flowsheet : Measurements   8/19/2019 2:49 PM CDT Height Measured - Standard 61.5 in    Weight Measured - Standard 158.2 lb    BSA 1.76 m2    Body Mass Index 29.4 kg/m2  HI      General:  Alert and oriented, No acute distress.    Eye:  Pupils are equal, round and reactive to light, Normal conjunctiva.    HENT:  Normocephalic, Oral mucosa is moist, No pharyngeal erythema.    Neck:  Supple, Non-tender, No lymphadenopathy.    Respiratory:  Lungs are clear to auscultation.    Cardiovascular:  Normal rate, Regular rhythm.       Impression and Plan   Diagnosis     Migraines (SMU17-BA G43.909).     Course:  Worsening.    Plan:  will use toradol, discussed triggers, track exacerbations.    Orders     Orders (Selected)   Outpatient Orders  Completed  ketorolac: 60 mg, im, once.     Diagnosis     Hot flashes due to menopause (PGQ23-FO N95.1).     Course:  patient with hot flashes from menopause, does not want to use HRT, after reviewing options would like to try low dose SSRI, contact with effectiveness in 2-3 weeks - if hot flashes not improving, consider increased dose or change in medicaiton.

## 2022-02-16 NOTE — PROGRESS NOTES
Patient:   VERO PAGAN            MRN: [id number]            FIN: [id number]               Age:   51 years     Sex:  Female     :  1965   Associated Diagnoses:   Recurrent maxillary sinusitis   Author:   Omkar Fernandez PA-C      Visit Information      Primary Care Provider (PCP):  Edith Yu MD# [id number]   Visit type:  New symptom.    Source of history:  Self.    Referral source:  Self.    History limitation:  None.       Chief Complaint   2017 10:17 AM CDT   c/o congestion, sinus headache, sore throat x 2 weeks        History of Present Illness             The patient presents with sinus problem.  The sinus problem is located in the maxillary sinus.  The sinus problem is characterized by nasal congestion, rhinorrhea, headache and facial pain.  The severity of the sinus problem is moderate.  The sinus problem is episodic, fluctuates in intensity and is worsening.  The sinus problem has lasted for 3 week(s).  The context of the sinus problem: occurred in association with illness.  Associated symptoms consist of cough and sore throat.  CC above noted and confirmed with the patient..        Review of Systems   Constitutional:  Negative except as documented in history of present illness.    Eye:  Negative.    Ear/Nose/Mouth/Throat:  Negative except as documented in history of present illness.    Respiratory:  Negative except as documented in history of present illness.       Health Status   Allergies:    Allergic Reactions (All)  Severity Not Documented  Iron (Swelling)  Sulfa drug (No reactions were documented)  Nonallergic Reactions (All)  Severity Not Documented  Erythromycin (Gi upset)   Medications:  (Selected)   Prescriptions  Prescribed  Amoxil 875 mg oral tablet: 1 tab(s) ( 875 mg ), PO, BID, x 10 day(s), # 20 tab(s), 0 Refill(s), Type: Acute, Pharmacy: [facility] #[id number], 1 tab(s) po bid,x10 day(s)   Problem list:    All Problems  Anemia / ICD-9-.9 / Confirmed  HLD  (Hyperlipidemia) / ICD-9-.4 / Confirmed  Migraines / ICD-9-.90 / Confirmed  Seasonal Allergies / ICD-9-.9 / Confirmed  Inactive: Tobacco abuse / ICD-9-.1  Resolved: Pregnancy / SNOMED CT 818312062  Resolved: Pregnancy / SNOMED CT 222763141      Histories   Past Medical History:    Active  HLD (Hyperlipidemia) (272.4)  Migraines (346.90)  Anemia (285.9)  Seasonal Allergies (477.9)  Resolved  Pregnancy (286674585):  Resolved on 8/2/1982 at 17 years.  Pregnancy (868342146):  Resolved on 7/6/1987 at 22 years.   Family History:    Diabetes mellitus  Grandmother (M)  Hypertension  Mother  Heart disease  Grandmother (M)  Diabetes mellitus type I  Daughter (Marixa)  Hypercholesterolemia  Mother  Father     Procedure history:    Colonoscopy (691162223) on 3/11/2013 at 47 Years.  Comments:  3/26/2013 12:38 PM - Romeo Rodgers MA  Normal colonoscopy repeat in 10 years  Esophagogastroduodenoscopy (5977219470) on 3/11/2013 at 47 Years.  Comments:  3/26/2013 12:39 PM Romeo Barth MA  Normal EGD no need for repeat   Social History:        Alcohol Assessment: Denies Alcohol Use            Never      Tobacco Assessment: Past            Past, Cigarettes, 3 per day.  10 year(s).      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  Spouse/Partner name: Don.  2 children.  Living situation:               Home/Independent.                     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      Exercise and Physical Activity Assessment: Occasional exercise            Exercise frequency: 1-2 times/week.   Exercise type: Walking.      Sexual Assessment            Sexually active: Yes.  Sexual orientation: Heterosexual.      Other Assessment            First menses age 11.  Menstrual duration 28 Days.  No history of abnormal Pap.        Physical Examination   Vital Signs   4/14/2017 10:17 AM CDT Temperature Tympanic 98 DegF    Peripheral Pulse Rate 80 bpm    Pulse Site Radial artery    HR Method Electronic    Systolic Blood Pressure 108 mmHg    Diastolic Blood Pressure 66 mmHg    Mean Arterial Pressure 80 mmHg    BP Site Right arm    BP Method Manual    Oxygen Saturation 97 %      Measurements from flowsheet : Measurements   4/14/2017 10:17 AM CDT Height Measured - Standard 61.5 in    Weight Measured - Standard 151 lb    BSA 1.72 m2    Body Mass Index 28.07 kg/m2      General:  Alert and oriented, No acute distress.    Eye:  Pupils are equal, round and reactive to light, Extraocular movements are intact, Normal conjunctiva.    HENT:  Normocephalic, Tympanic membranes are clear, Oral mucosa is moist.         Nose: Both nostrils, Discharge ( Moderate amount, Purulent ).         Sinus: Bilateral, Maxillary sinus, Tenderness.    Neck:  Supple, Non-tender, No lymphadenopathy.    Respiratory:  Lungs are clear to auscultation, Respirations are non-labored.    Cardiovascular:  Normal rate, Regular rhythm, No murmur.       Impression and Plan   Diagnosis     Recurrent maxillary sinusitis (NBB80-LE J01.01).     Patient Instructions:       Counseled: Patient, Regarding diagnosis, Regarding medications, Activity, Verbalized understanding.    Orders     Orders (Selected)   Prescriptions  Prescribed  Amoxil 875 mg oral tablet: 1 tab(s) ( 875 mg ), PO, BID, x 10 day(s), # 20 tab(s), 0 Refill(s), Type: Acute, Pharmacy: [facility] #[id number], 1 tab(s) po bid,x10 day(s).     Take medicine as prescribed, side effects discussed.  Tylenol/ibuprofen for fever and discomfort.  Push fluids.  RTC if not improving in 36-48 hours, prior if  concerns as we have discussed.

## 2022-02-16 NOTE — PROGRESS NOTES
Patient:   VERO PAGAN            MRN: [id number]            FIN: [id number]               Age:   54 years     Sex:  Female     :  1965   Associated Diagnoses:   Dysuria   Author:   Edith Yu MD      Visit Information   Visit type:  Telephone Encounter.    Source of history:  Patient.    Location of patient:  home  Call Start Time:   1124am  Call End Time:    _1130am      Chief Complaint   2020 10:53 AM CDT   c/o burning and pain with urination, some itching; verbal consent for telemedicine.   _      History of Present Illness   Today's visit was conducted via telephone due to the COVID-19 pandemic. Patient's consent to telephone visit was obtained and documented.      Reason for visit:  patient notes patient feels vaginal irritation and itching  has noticed some increased urinary discomfort  worse with going to the bathroom  no discharge or blood  feels pressure in vaginal area, notes some suprapubic pressure  no back or kidney pain      Impression and Plan   Diagnosis     Dysuria (SAA40-ZM R30.0).     Plan:  symptoms are ongoing, not classic for UTI, would like to drop off urine sample for testing. If abnormal, will treat with antibiotics. If positive, might need exam for further evaluation..    Orders     Orders   Requests (Return to Office):  Return to Clinic (Request) (Order): RFV: tomorrow for UA with reflex, urine culture.        Health Status   Allergies:    Allergic Reactions (Selected)  Severity Not Documented  Iron (Swelling)  Sulfa drug (No reactions were documented)  Nonallergic Reactions (Selected)  Severity Not Documented  Erythromycin (Gi upset)   Medications:  (Selected)      Problem list:    All Problems  Seasonal Allergies / ICD-9-.9 / Confirmed  Obesity / SNOMED CT 7917810101 / Probable  Migraines / ICD-9-.90 / Confirmed  HLD (Hyperlipidemia) / ICD-9-.4 / Confirmed  Anemia / ICD-9-.9 / Confirmed      Histories   Social History:        Alcohol  Assessment: Denies Alcohol Use            Never      Tobacco Assessment: Past            Past, Cigarettes, 3 per day.  10 year(s).      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  Spouse/Partner name: Don.  2 children.  Living situation:               Home/Independent.                     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      Exercise and Physical Activity Assessment: Occasional exercise            Exercise frequency: 1-2 times/week.  Exercise type: Walking.      Sexual Assessment            Sexually active: Yes.  Sexual orientation: Heterosexual.      Other Assessment            First menses age 11.  Menstrual duration 28 Days.  No history of abnormal Pap.

## 2022-02-16 NOTE — LETTER
(Inserted Image. Unable to display)   [address], [place] [id number] [phone] (phone) [phone] (fax)  October 26, 2021      VERO PAGAN       [address] [place] [id number]      Dear VERO,    Thank you for selecting [facility] for your healthcare needs. Below you will find the results of the recent tests done at our clinic.     Your lab results are listed below and were sent to you through the portal.     Result Name Current Result Previous Result Reference Range   Cholesterol (mg/dL) ((H)) 323 10/25/2021 ((H)) 311 3/23/2021  - <200   HDL (mg/dL)  69 10/25/2021  76 3/23/2021 > OR = 50 -    Triglyceride (mg/dL)  133 10/25/2021 ((H)) 152 3/23/2021  - <150   LDL ((H)) 226 10/25/2021 ((H)) 203 3/23/2021    Cholesterol/HDL Ratio  4.7 10/25/2021  4.1 3/23/2021  - <5.0   Non-HDL Cholesterol ((H)) 254 10/25/2021 ((H)) 235 3/23/2021  - <130       Please contact me or my assistant at [phone] if you have any questions or concerns.     Sincerely,        Edith Yu M.D.

## 2022-02-16 NOTE — PROGRESS NOTES
Chief Complaint    Lt ear pain and sore throat x2 weeks, no known exposure to COVID.  History of Present Illness       Patient is a 56-year-old female has had a sore throat and left-sided ear pain for 2 weeks.  Her ear is painful and feels plugged.  She has no rhinitis or cough.  Denies tooth pain but maybe has some pain in her gums on that side.  No headache or myalgias.  No fatigue.  No fevers or chills.  She has no change in her ability to taste or smell although her sense of taste has been gone for several years due to tobacco use.       She does get nausea and migraines but has not had any in the last 2 weeks.       She is fully vaccinated for Covid       She did babysit her niece and nephew when they had cold symptoms but they tested negative for Covid.  Review of Systems      Negative except as listed in HPI.  Physical Exam   Vitals & Measurements    T: 97.0  F (Tympanic)  HR: 64 (Peripheral)  BP: 122/60     HT: 63.25 in  WT: 153.5 lb  BMI: 26.97       Vitals noted and within normal limits.      Patient is alert, oriented and in no acute distress.      Eyes: conjunctiva not injected.      Ears: canals patent, TMs intact, no erythema and no bulging      Mouth: mucous membranes pink and moist, pharynx not erythematous      Tenderness on the left lower mandible.  no erythema or swelling      Neck is supple with no lymphadenopathy      Heart: regular rate and rhythm with no murmur      Lungs: clear to auscultation bilaterally  Assessment/Plan       Tooth infection (K04.7)          We will treat with Augmentin         Recommended follow-up with her dentist in 2 weeks         Patient verbalized understanding         Ordered:          amoxicillin-clavulanate, = 1 tab(s), Oral, q12 hrs, x 10 day(s), # 20 tab(s), 0 Refill(s), Type: Acute, Pharmacy: [facility], 1 tab(s) Oral q12 hrs,x10 day(s), 63.25, in, 09/27/21 15:20:00 CDT, Height Measured, 153.5...,  (Ordered)           Patient Information     Name:VERO PAGAN      Address:       [address] [place] [id number]     Sex:Female     YOB: 1965     [phone]     Emergency Contact:GABRIELA PAGAN     MRN:[id number]     FIN:[id number]     Location:[facility]     Date of Service:09/27/2021      Primary Care Physician:       Edith Yu MD, [phone]       Edith Yu MD, [phone]      Attending Physician:       Harriet Thompson MD, [phone]  Problem List/Past Medical History    Ongoing     Anemia     HLD (Hyperlipidemia)     Migraines     Obesity     Seasonal Allergies     Tobacco abuse       Comments: Quit smoking.    Historical     Pregnancy     Pregnancy  Procedure/Surgical History     Cardiac computed tomography for calcium scoring (04/14/2021)      Comments: Total Agatston calcium score is 0..     Colonoscopy (03/11/2013)      Comments: Normal colonoscopy repeat in 10 years.     Esophagogastroduodenoscopy (03/11/2013)      Comments: Normal EGD no need for repeat.  Medications    Augmentin 875 mg-125 mg oral tablet, 1 tab(s), Oral, q12 hrs    pantoprazole 40 mg oral delayed release tablet, 40 mg= 1 tab(s), Oral, daily, 2 refills    Zofran ODT 4 mg oral tablet, disintegrating, 4 mg= 1 tab(s), Oral, tid, PRN, 5 refills  Allergies    Iron (Swelling)    erythromycin (GI upset)    sulfa drug  Social History    Smoking Status     Former smoker     Alcohol      Never     Electronic Cigarette/Vaping      Electronic Cigarette Use: Never.     Exercise      Exercise frequency: 1-2 times/week. Exercise type: Walking.     Home/Environment      Marital status:  (Living together). Spouse/Partner name: Don. 2 children. Living situation: Home/Independent.     Nutrition/Health      Type of diet: Regular.     Other      First menses age 11. Menstrual duration 28 Days. No history of abnormal Pap.     Sexual      Sexually active: Yes. Sexual orientation:  Heterosexual.     Substance Abuse      Never     Tobacco      Former smoker, quit more than 30 days ago  Family History    Alzheimer's disease: Mother.    Arthritis: Mother and Father.    Cancer in situ of thyroid: Mother.    Diabetes mellitus: Aunt, Grandmother (M) and Uncle.    Diabetes mellitus type I: Daughter.    Heart disease: Grandmother (M).    Hypercholesterolemia: Mother and Father.    Hypertension: Mother.  Immunizations       Scheduled Immunizations       Dose Date(s)       DTaP       12/01/1969       SARS-CoV-2 (COVID-19) Moderna-1273       03/19/2021, 04/16/2021       Other Immunizations               Hep B       01/01/2001       pneumococcal (PPSV23)       08/15/2018       Td       08/29/2005       tetanus/diphth/pertuss (Tdap) adult/adol       07/02/2014

## 2022-02-16 NOTE — PROGRESS NOTES
Patient:   VERO PAGAN            MRN: [id number]            FIN: [id number]               Age:   56 years     Sex:  Female     :  1965   Associated Diagnoses:   Exposure to COVID-19 virus   Author:   Edith Yu MD      Visit Information      Date of Service: 2021 10:56 am  Performing Location: [facility]  Encounter#: [id number]   Visit type:  Telephone Encounter.    Source of history:  Patient.    Location of patient:  home  Call Start Time:     Call End Time:          Chief Complaint   2021 12:11 PM CST  Pt consent to telemed visit. Pt's  COVID pos. Is asking if or when she should be tested. Has no symptoms.     _      History of Present Illness   Today's visit was conducted via telephone due to the COVID-19 pandemic. Patient's consent to telephone visit was obtained and documented.      Reason for visit:    phone visit for covid exposure  patient's  tested positive 21  patient with no symptoms as of today  did see her father over the weekend prior to his test resulting  she did rapid test at home yesterday that was negative  wondering about testing  patient fully vaccinated including booster done 12/10/21      Impression and Plan   Diagnosis     Exposure to COVID-19 virus (MXK54-NO Z20.822).     Course:  asymptomatic, per guidelines, will mask when out and test 5 days after exposure on 21. Separate in the home as able. Minimize contact with high risk people. Follow up if any concerns or symptoms develop..    Orders     Orders (Selected)   Outpatient Orders  Ordered  SARS-CoV-2 RNA (COVID-19), Qualitative NAAT (Request): Exposure to COVID-19 virus.        Health Status   Allergies:    Allergic Reactions (Selected)  Severity Not Documented  Iron (Swelling)  Sulfa drug (No reactions were documented)  Nonallergic Reactions (Selected)  Severity Not Documented  Erythromycin (Gi upset)   Medications:  (Selected)    Prescriptions  Prescribed  SUMAtriptan 100 mg oral tablet: = 1 tab(s) ( 100 mg ), Oral, once, PRN: for migraine headache, # 9 tab(s), 5 Refill(s), Type: Soft Stop, Pharmacy: [facility], 1 tab(s) Oral once,PRN:for migraine headache, 63.25, in, 12/15/2...  Zofran ODT 4 mg oral tablet, disintegrating: = 1 tab(s) ( 4 mg ), Oral, tid, PRN: nausea, # 9 tab(s), 5 Refill(s), Type: Maintenance, Pharmacy: [facility], 1 tab(s) Oral tid,PRN:nausea, 63.25, in, 12/15/21 16:24:00 CST, Height Measured,...  ketorolac 10 mg oral tablet: = 1 tab(s) ( 10 mg ), Oral, tid, Instructions: not to exceed 30 mg/day and 3 days duration for all dose forms, PRN: for pain, # 10 tab(s), 2 Refill(s), Type: Maintenance, Pharmacy: [facility]...  pantoprazole 40 mg oral delayed release tablet: = 1 tab(s), Oral, daily, # 90 tab(s), 3 Refill(s), Type: Maintenance, Pharmacy: [facility], 1 tab(s) Oral daily, 63.25, in, 12/15/21 16:24:00 CST, Height Measured, 148.9, lb, 12/15/21 16:24:0...,    Medications          *denotes recorded medication          SUMAtriptan 100 mg oral tablet: 100 mg, 1 tab(s), Oral, once, PRN: for migraine headache, 9 tab(s), 5 Refill(s).          ketorolac 10 mg oral tablet: 10 mg, 1 tab(s), Oral, tid, not to exceed 30 mg/day and 3 days duration for all dose forms, PRN: for pain, 10 tab(s), 2 Refill(s).          Zofran ODT 4 mg oral tablet, disintegratin mg, 1 tab(s), Oral, tid, PRN: nausea, 9 tab(s), 5 Refill(s).          pantoprazole 40 mg oral delayed release tablet: 1 tab(s), Oral, daily, 90 tab(s), 3 Refill(s).       Problem list:    All Problems  Anemia / ICD-9-.9 / Confirmed  HLD (Hyperlipidemia) / ICD-9-.4 / Confirmed  Migraines / ICD-9-.90 / Confirmed  Obesity /  SNOMED CT 4535982310 / Probable  Seasonal Allergies / ICD-9-.9 / Confirmed      Histories   Past Medical History:    Active  HLD (Hyperlipidemia) (ICD-9-.4)  Migraines (ICD-9-.90)  Anemia (ICD-9-.9)  Seasonal Allergies (ICD-9-.9)  Resolved  Pregnancy (SNOMED CT 483234004):  Resolved on 8/2/1982 at 17 years.  Pregnancy (SNOMED CT 708399583):  Resolved on 7/6/1987 at 22 years.   Family History:    Diabetes mellitus  Grandmother (M)  Aunt  Uncle  Hypertension  Mother  Heart disease  Grandmother (M)  Diabetes mellitus type I  Daughter (Marixa)  Arthritis  Mother  Father  Hypercholesterolemia  Mother  Father  Alzheimer's disease  Mother  Cancer in situ of thyroid  Mother     Procedure history:    Cardiac computed tomography for calcium scoring (2571102420) on 4/14/2021 at 55 Years.  Comments:  4/16/2021 1:48 PM CDT - Mally Montoya  Total Agatston calcium score is 0.  Colonoscopy ([id number]) on 3/11/2013 at 47 Years.  Comments:  3/26/2013 12:38 PM CDT Romeo Barth MA  Normal colonoscopy repeat in 10 years  Esophagogastroduodenoscopy (2614530460) on 3/11/2013 at 47 Years.  Comments:  3/26/2013 12:39 PM Romeo Kimbrough MA  Normal EGD no need for repeat   Social History:        Electronic Cigarette/Vaping Assessment            Electronic Cigarette Use: Never.      Alcohol Assessment            Never      Tobacco Assessment            Former smoker, quit more than 30 days ago      Substance Abuse Assessment            Never      Home and Environment Assessment            Marital status:  (Living together).  Spouse/Partner name: Don.  2 children.  Living situation:               Home/Independent.                     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      Exercise and Physical Activity Assessment            Exercise frequency: 1-2 times/week.   Exercise type: Walking.      Sexual Assessment            Sexually active: Yes.  Sexual orientation: Heterosexual.      Other Assessment            First menses age 11.  Menstrual duration 28 Days.  No history of abnormal Pap.        Physical Examination   Measurements from flowsheet : Measurements   12/20/2021 12:11 PM CST  Height Measured - Standard                63.25 in        Review / Management   Results review:  Lab results   11/11/2021 2:30 PM CST Coronavirus SARS-CoV-2 (COVID-19) TR Negative   10/25/2021 8:58 AM CDT Cholesterol 323 mg/dL  HI    Non-  HI    HDL 69 mg/dL    Chol/HDL Ratio 4.7      HI    Triglyceride 133 mg/dL   9/30/2021 2:00 PM CDT Coronavirus SARS-CoV-2 (COVID-19) TR Negative   .

## 2022-02-16 NOTE — PROGRESS NOTES
Chief Complaint    C/o migraine headache  History of Present Illness       Patient is a 56-year-old female who complains of migraine headache off and on since Wednesday, November 24.       She woke up with this headache and feels it in the bilateral frontal region of her head.  It is a pounding type of headache.  She is been trying ibuprofen, ice and heat at home.       She has a positive history of migraines and used to have a standing order for Toradol but is unable to continue with that.  She recently has an increase in the frequency of her migraines and is getting 2-3 migraines per month.  Besides ibuprofen she does not have any migraine medication at home for migraine episodes or for prophylaxis.       This migraine does not feel any different than her typical migraine.  No worrisome symptoms.  Patient will need a note for work.  Review of Systems       Negative except as listed in HPI  Physical Exam   Vitals & Measurements    HR: 62 (Peripheral)  BP: 110/72  SpO2: 99%     HT: 63.25 in  WT: 148.9 lb  BMI: 26.17        Vitals noted and within normal limits       In general she is alert and oriented and in no acute distress except for that she is sitting in a darkened room       Did not do a full neuro exam as this is her typical migraine  Assessment/Plan       Migraines (G43.909)          Toradol has worked well for her in the past and so we treated with 60 mg of Toradol IM x1 here today.         Discussed with the patient that this frequency of migraines may be better treated with home abortive therapy and or prophylactic medication.  I recommended that she follow-up with her primary care physician within the next 2 weeks to discuss her ongoing, increasing frequency, migraines.         Note done for work         Ordered:          ketorolac, 60 mg, im, once, (Completed)          59610 therapeutic prophylactic/dx injection subq/im (Charge), Quantity: 1, Migraines           ketorolac tromethamine inj, 15 mg  (Charge), Quantity: 4, Migraines          Return to Clinic (Request), RFV: Dr Yu discuss migraines - consider home abortive treatment or prophylaxis?, Return in 2 weeks           Patient Information     Name:VERO PAGAN      Address:       [address] [place] [id number]     Sex:Female     YOB: 1965     [phone]     Emergency Contact:GABRIELA PAGAN     MRN:[id number]     FIN:[id number]     Location:[facility]     Date of Service:11/27/2021      Primary Care Physician:       Edith Yu MD, [phone]       Edith Yu MD, [phone]      Attending Physician:       Harriet Thompson MD, [phone]  Problem List/Past Medical History    Ongoing     Anemia     HLD (Hyperlipidemia)     Migraines     Obesity     Seasonal Allergies     Tobacco abuse       Comments: Quit smoking.    Historical     Pregnancy     Pregnancy  Procedure/Surgical History     Cardiac computed tomography for calcium scoring (04/14/2021)      Comments: Total Agatston calcium score is 0..     Colonoscopy (03/11/2013)      Comments: Normal colonoscopy repeat in 10 years.     Esophagogastroduodenoscopy (03/11/2013)      Comments: Normal EGD no need for repeat.  Medications    pantoprazole 40 mg oral delayed release tablet, 1 tab(s), Oral, daily, 2 refills    Zofran ODT 4 mg oral tablet, disintegrating, 4 mg= 1 tab(s), Oral, tid, PRN, 5 refills  Allergies    Iron (Swelling)    erythromycin (GI upset)    sulfa drug  Social History    Smoking Status     Former smoker     Alcohol      Never     Electronic Cigarette/Vaping      Electronic Cigarette Use: Never.     Exercise      Exercise frequency: 1-2 times/week. Exercise type: Walking.     Home/Environment      Marital status:  (Living together). Spouse/Partner name: Don. 2 children. Living situation: Home/Independent.     Nutrition/Health      Type of diet: Regular.     Other      First menses age 11. Menstrual duration  28 Days. No history of abnormal Pap.     Sexual      Sexually active: Yes. Sexual orientation: Heterosexual.     Substance Abuse      Never     Tobacco      Former smoker, quit more than 30 days ago  Family History    Alzheimer's disease: Mother.    Arthritis: Mother and Father.    Cancer in situ of thyroid: Mother.    Diabetes mellitus: Aunt, Grandmother (M) and Uncle.    Diabetes mellitus type I: Daughter.    Heart disease: Grandmother (M).    Hypercholesterolemia: Mother and Father.    Hypertension: Mother.  Lab Results       Lab Results (Last 4 results within 90 days)        Cholesterol: 323 mg/dL High (10/25/21 08:58:00)       Non-HDL Cholesterol: 254 High (10/25/21 08:58:00)       HDL: 69 mg/dL (10/25/21 08:58:00)       Cholesterol/HDL Ratio: 4.7 (10/25/21 08:58:00)       LDL: 226 High (10/25/21 08:58:00)       Triglyceride: 133 mg/dL (10/25/21 08:58:00)       Coronavirus SARS-CoV-2 (COVID-19) TR: Negative (11/11/21 14:30:00)       Coronavirus SARS-CoV-2 (COVID-19) TR: Negative (09/30/21 14:00:00)  Immunizations       Scheduled Immunizations       Dose Date(s)       DTaP       12/01/1969       SARS-CoV-2 (COVID-19) Moderna-1273       03/19/2021, 04/16/2021       Other Immunizations               Hep B       01/01/2001       pneumococcal (PPSV23)       08/15/2018       Td       08/29/2005       tetanus/diphth/pertuss (Tdap) adult/adol       07/02/2014

## 2022-02-16 NOTE — NURSING NOTE
Comprehensive Intake Entered On:  6/4/2019 11:44 AM CDT    Performed On:  6/4/2019 11:40 AM CDT by Jayshree Iniguez MA               Summary   Chief Complaint :   Sore throat, sinus pressure    Menstrual Status :   Menarcheal   Weight Measured :   158.2 lb(Converted to: 158 lb 3 oz, 71.76 kg)    Height Measured :   61.5 in(Converted to: 5 ft 1 in, 156.21 cm)    Body Mass Index :   29.4 kg/m2 (HI)    Body Surface Area :   1.76 m2   Systolic Blood Pressure :   126 mmHg   Diastolic Blood Pressure :   74 mmHg   Mean Arterial Pressure :   91 mmHg   Peripheral Pulse Rate :   76 bpm   BP Site :   Left arm   Pulse Site :   Radial artery   BP Method :   Manual   HR Method :   Manual   Temperature Tympanic :   98.0 DegF(Converted to: 36.7 DegC)    Jayshree Iniguez MA - 6/4/2019 11:40 AM CDT   Health Status   Allergies Verified? :   Yes   Medication History Verified? :   Yes   Immunizations Current :   Yes   Medical History Verified? :   Yes   Pre-Visit Planning Status :   Completed   Tobacco Use? :   Former smoker   Jayshree Iniguez MA - 6/4/2019 11:40 AM CDT   Consents   Consent for Immunization Exchange :   Consent Granted   Consent for Immunizations to Providers :   Consent Granted   Jayshree Iniguez MA - 6/4/2019 11:40 AM CDT   Meds / Allergies   (As Of: 6/4/2019 11:44:36 AM CDT)   Allergies (Active)   erythromycin  Estimated Onset Date:   Unspecified ; Reactions:   GI upset ; Created By:   Maylin Banks; Reaction Status:   Active ; Category:   Drug ; Substance:   erythromycin ; Type:   Sensitivity ; Updated By:   Maylin Banks; Reviewed Date:   6/4/2019 11:42 AM CDT      Iron  Estimated Onset Date:   Unspecified ; Reactions:   Swelling ; Created By:   Brenda Almeida CMA; Reaction Status:   Active ; Category:   Drug ; Substance:   Iron ; Type:   Allergy ; Updated By:   Brenda Almeida CMA; Reviewed Date:   6/4/2019 11:42 AM CDT      sulfa drug  Estimated Onset Date:   Unspecified ; Created By:   Shandra Marshall; Reaction  Status:   Active ; Category:   Drug ; Substance:   sulfa drug ; Type:   Allergy ; Updated By:   Shandra Marshall; Reviewed Date:   6/4/2019 11:42 AM CDT        Medication List   (As Of: 6/4/2019 11:44:36 AM CDT)

## 2022-02-16 NOTE — TELEPHONE ENCOUNTER
---------------------  From: Jayshree Iniguez MA (Phone Messages Pool ([id number])   To: Omkar Fernandez PA-C;     Sent: 9/8/2020 3:42:51 PM CDT  Subject: Phone Note: Not feeling better     PCP:   ETHEL      Time of Call:  3:30pm       Person Calling:  Lesly  Phone number:  [phone]    Returned call at:     Note:   Patient said she was told to called back if she was not feeling any better after starting antibiotics. States she is not feeling much better. Does have two days left of antibiotics, but is wondering if she should be seen or what she should do.    Pharmacy: [facility]    Last office visit and reason:  9/1/20    Transferred to: [facility]---------------------  From: Omkar Fernandez PA-C   To: Phone Messages Pool ([id number]  [place]);     Sent: 9/8/2020 3:58:09 PM CDT  Subject: RE: Phone Note: Not feeling better     Change to cefdinir 300 mg po BID x tend days. Call in ten days if not resolved.  KATHLEEN sent it in. Tried calling patient 3 times no answer and no VM set up.

## 2022-02-16 NOTE — NURSING NOTE
Comprehensive Intake Entered On:  9/9/2020 11:16 AM CDT    Performed On:  9/9/2020 11:14 AM CDT by Jayshree Iniguez MA               Summary   Chief Complaint :   Follow up sore throat, not doing much better; Verbal permisison for telephone visit    Menstrual Status :   Menarcheal   Height Measured :   61.5 in(Converted to: 5 ft 1 in, 156.21 cm)    Jayshree Iniguez MA - 9/9/2020 11:14 AM CDT   Health Status   Allergies Verified? :   Yes   Medication History Verified? :   Yes   Immunizations Current :   Yes   Medical History Verified? :   Yes   Pre-Visit Planning Status :   Completed   Tobacco Use? :   Never smoker   Jayshree Iniguez MA - 9/9/2020 11:14 AM CDT   Consents   Consent for Immunization Exchange :   Consent Granted   Consent for Immunizations to Providers :   Consent Granted   Jayshree Iniguez MA - 9/9/2020 11:14 AM CDT   Meds / Allergies   (As Of: 9/9/2020 11:16:07 AM CDT)   Allergies (Active)   erythromycin  Estimated Onset Date:   Unspecified ; Reactions:   GI upset ; Created By:   Maylin Banks; Reaction Status:   Active ; Category:   Drug ; Substance:   erythromycin ; Type:   Sensitivity ; Updated By:   Maylin Banks; Reviewed Date:   9/9/2020 11:16 AM CDT      Iron  Estimated Onset Date:   Unspecified ; Reactions:   Swelling ; Created By:   Brenda Almeida CMA; Reaction Status:   Active ; Category:   Drug ; Substance:   Iron ; Type:   Allergy ; Updated By:   Brenda Almeida CMA; Reviewed Date:   9/9/2020 11:16 AM CDT      sulfa drug  Estimated Onset Date:   Unspecified ; Created By:   Shandra Marshall; Reaction Status:   Active ; Category:   Drug ; Substance:   sulfa drug ; Type:   Allergy ; Updated By:   Shandra Marshall; Reviewed Date:   9/9/2020 11:16 AM CDT        Medication List   (As Of: 9/9/2020 11:16:07 AM CDT)   Prescription/Discharge Order    cefdinir  :   cefdinir ; Status:   Prescribed ; Ordered As Mnemonic:   cefdinir 300 mg oral capsule ; Simple Display Line:   300 mg, 1 cap(s), Oral, q12 hrs,  for 10 day(s), 20 cap(s), 0 Refill(s) ; Ordering Provider:   Omkar Fernandez PA-C; Catalog Code:   cefdinir ; Order Dt/Tm:   9/8/2020 4:00:40 PM CDT            ID Risk Screen   Recent Travel History :   No recent travel   Family Member Travel History :   No recent travel   Other Exposure to Infectious Disease :   Unknown   Jayshree Iniguez MA - 9/9/2020 11:14 AM CDT

## 2022-02-16 NOTE — NURSING NOTE
Comprehensive Intake Entered On:  3/29/2021 8:18 AM CDT    Performed On:  3/29/2021 8:12 AM CDT by Maryan Lara CMA               Summary   Chief Complaint :   Annual px-prevention handout reviewed. C/o L arm pain after fall early this winter. Started in wrist and has moved up to her shoulder.    Menstrual Status :   Menarcheal   Weight Measured :   154 lb(Converted to: 154 lb 0 oz, 69.853 kg)    Height Measured :   63.25 in(Converted to: 5 ft 3 in, 160.65 cm)    Body Mass Index :   27.06 kg/m2 (HI)    Body Surface Area :   1.76 m2   Systolic Blood Pressure :   99 mmHg   Diastolic Blood Pressure :   66 mmHg   Mean Arterial Pressure :   77 mmHg   Peripheral Pulse Rate :   66 bpm   BP Site :   Right arm   BP Method :   Electronic   Temperature Tympanic :   96.3 DegF(Converted to: 35.7 DegC)  (LOW)    Oxygen Saturation :   100 %   Maryan Lara CMA - 3/29/2021 8:12 AM CDT   Health Status   Allergies Verified? :   Yes   Medication History Verified? :   Yes   Immunizations Current :   Yes   Pre-Visit Planning Status :   Completed   Tobacco Use? :   Former smoker   Maryan Lara CMA - 3/29/2021 8:12 AM CDT   Consents   Consent for Immunization Exchange :   Consent Granted   Consent for Immunizations to Providers :   Consent Granted   Maryan Lara CMA - 3/29/2021 8:12 AM CDT   Meds / Allergies   (As Of: 3/29/2021 8:18:27 AM CDT)   Allergies (Active)   erythromycin  Estimated Onset Date:   Unspecified ; Reactions:   GI upset ; Created By:   Maylin Banks; Reaction Status:   Active ; Category:   Drug ; Substance:   erythromycin ; Type:   Sensitivity ; Updated By:   Maylin Banks; Reviewed Date:   3/29/2021 8:16 AM CDT      Iron  Estimated Onset Date:   Unspecified ; Reactions:   Swelling ; Created By:   Brenda Almeida CMA; Reaction Status:   Active ; Category:   Drug ; Substance:   Iron ; Type:   Allergy ; Updated By:   Brenda Almeida CMA; Reviewed Date:   3/29/2021 8:16 AM CDT      sulfa drug  Estimated Onset Date:    Unspecified ; Created By:   Shandra Marshall; Reaction Status:   Active ; Category:   Drug ; Substance:   sulfa drug ; Type:   Allergy ; Updated By:   Shandra Marshall; Reviewed Date:   3/29/2021 8:16 AM CDT        Medication List   (As Of: 3/29/2021 8:18:27 AM CDT)   Prescription/Discharge Order    ondansetron  :   ondansetron ; Status:   Prescribed ; Ordered As Mnemonic:   Zofran ODT 4 mg oral tablet, disintegrating ; Simple Display Line:   4 mg, 1 tab(s), Oral, tid, for 3 day(s), 9 tab(s), 3 Refill(s) ; Ordering Provider:   Omkar Fernandez PA-C; Catalog Code:   ondansetron ; Order Dt/Tm:   2/19/2021 2:41:03 PM CST            ID Risk Screen   Recent Travel History :   No recent travel   Family Member Travel History :   No recent travel   Other Exposure to Infectious Disease :   Unknown   COVID-19 Testing Status :   No positive COVID-19 test   Maryan Lara CMA - 3/29/2021 8:12 AM CDT   Social History   Social History   (As Of: 3/29/2021 8:18:27 AM CDT)   Alcohol:  Denies Alcohol Use      Never   (Last Updated: 5/22/2014 1:48:47 PM CDT by Mara Wesley)          Tobacco:  Past      Former smoker, quit more than 30 days ago   (Last Updated: 12/8/2020 11:19:16 AM CST by Chichi Ceja CMA)          Electronic Cigarette/Vaping:        Electronic Cigarette Use: Never.   (Last Updated: 12/8/2020 11:19:19 AM CST by Chichi Ceja CMA)          Substance Abuse:  Denies Substance Abuse      Never   (Last Updated: 5/22/2014 1:48:41 PM CDT by Mara Wesley)          Employment/School:        Employed   Comments:  1/19/2012 9:51 AM - Nasrin Machado LPN:    (Last Updated: 1/19/2012 9:51:46 AM CST by Nasrin Machado LPN)          Home/Environment:        Marital status:  (Living together).  Spouse/Partner name: Don.  2 children.  Living situation: Home/Independent.   Comments:  1/19/2012 9:52 AM - Nasrin Machado LPN: Adult children   (Last Updated: 1/19/2012 9:52:07 AM CST by Nasrin Machado LPN)           Nutrition/Health:        Type of diet: Regular.   (Last Updated: 1/19/2012 9:52:17 AM CST by Nasrin Machado LPN)          Exercise:  Occasional exercise      Exercise frequency: 1-2 times/week.  Exercise type: Walking.   (Last Updated: 3/1/2012 11:10:23 AM CST by Brenda Cook)          Sexual:        Sexually active: Yes.  Sexual orientation: Heterosexual.   (Last Updated: 5/22/2014 1:49:46 PM CDT by Mara Wesley)          Other:        First menses age 11.  Menstrual duration 28 Days.  No history of abnormal Pap.   (Last Updated: 5/22/2014 1:46:33 PM CDT by Mara Wesley)

## 2022-02-16 NOTE — NURSING NOTE
Urine Dipstick POC Entered On:  1/14/2019 9:32 AM CST    Performed On:  1/14/2019 9:31 AM CST by Schoenike , Andrea               Urine Dipstick POC   Urine Color Urine Dipstick :   Yellow   Urine Appearance Urine Dipstick :   Clear   Glucose Urine Dipstick :   Negative   Bilirubin Urine Dipstick :   Negative   Ketones Urine Dipstick :   Negative   Specific Gravity Urine Dipstick :   1.020   Blood Urine Dipstick :   Negative   pH Urine Dipstick :   6   Protein Urine Dipstick :   Negative   Urobilinogen Urine Dipstick :   0.2 mg/dl   Nitrite Urine Dipstick :   Negative   Leukocytes Urine Dipstick :   Negative   Schoenike , Andrea - 1/14/2019 9:31 AM CST   Details   Collection Date :   1/14/2019 9:30 AM CST   Handling Specimen POC :   Midstream   POC Test Comments :   Lab Test Preformed by:   [facility] Office  [address] [place] [address]  Phone: [phone]  Fax: [phone]     Schoenike , Andrea - 1/14/2019 9:31 AM CST

## 2022-02-16 NOTE — PROGRESS NOTES
Patient came to clinic today for covid frankide testing per [facility]. O2= 97%. Specimens sent to [facility] and forms faxed to [facility]. Priority= 0    AES

## 2022-02-16 NOTE — PROGRESS NOTES
Patient:   VERO PAGAN            MRN: [id number]            FIN: [id number]               Age:   51 years     Sex:  Female     :  1965   Associated Diagnoses:   Dysuria; Finger pain, right   Author:   Omkar Fernandez PA-C      Visit Information   Visit type:  New symptom.    Accompanied by:  No one.    Source of history:  Self.    Referral source:  Self.    History limitation:  None.       Chief Complaint   2017 9:40 AM CST     c/o dysuria x 2 weeks, also c/o right ring finger pain, smashed it between 2 hilliard yesterday      History of Present Illness             The patient presents with dysuria.  The dysuria is characterized by burning and a sensation of bladder fullness.  The severity of the dysuria is mild.  The dysuria has lasted for 2 week(s).  Associated symptoms consist of none.  Check finger. Caught between two hilliard..        Review of Systems   Constitutional:  Negative except as documented in history of present illness.    Gastrointestinal:  Negative.    Genitourinary:  Negative except as documented in history of present illness.    Gynecologic:  Negative.       Health Status   Allergies:    Allergic Reactions (All)  Severity Not Documented  Iron (Swelling)  Sulfa drug (No reactions were documented)  Nonallergic Reactions (All)  Severity Not Documented  Erythromycin (Gi upset)   Medications:  (Selected)   Prescriptions  Prescribed  Nexium 40 mg oral delayed release capsule: 1 cap(s) ( 40 mg ), PO, Daily, # 30 cap(s), 2 Refill(s), Type: Maintenance, Pharmacy: [id number], 1 cap(s) po daily  Documented Medications  Documented  omeprazole 20 mg oral delayed release tablet: 1 tab(s) ( 20 mg ), po, daily, 0 Refill(s), Type: Maintenance   Problem list:    All Problems  Anemia / ICD-9-.9 / Confirmed  HLD (Hyperlipidemia) / ICD-9-.4 / Confirmed  Migraines / ICD-9-.90 / Confirmed  Seasonal Allergies / ICD-9-.9 / Confirmed  Inactive: Tobacco abuse / ICD-9-CM  305.1  Resolved: Pregnancy / SNOMED CT 344062635  Resolved: Pregnancy / SNOMED CT 859962157      Histories   Past Medical History:    Active  HLD (Hyperlipidemia) (272.4)  Migraines (346.90)  Anemia (285.9)  Seasonal Allergies (477.9)  Resolved  Pregnancy (135911592):  Resolved on 8/2/1982 at 17 years.  Pregnancy (704740441):  Resolved on 7/6/1987 at 22 years.   Family History:    Diabetes mellitus  Grandmother (M)  Hypertension  Mother  Heart disease  Grandmother (M)  Diabetes mellitus type I  Daughter (Marixa)  Hypercholesterolemia  Mother  Father     Procedure history:    Colonoscopy ([id number]) on 3/11/2013 at 47 Years.  Comments:  3/26/2013 12:38 PM - Romeo Rodgers MA  Normal colonoscopy repeat in 10 years  Esophagogastroduodenoscopy ([id number]) on 3/11/2013 at 47 Years.  Comments:  3/26/2013 12:39 PM Romeo Barth MA  Normal EGD no need for repeat      Physical Examination   Vital Signs   1/2/2017 9:40 AM CST Temperature Temporal 97.2 DegF  LOW    Peripheral Pulse Rate 68 bpm    Pulse Site Radial artery    Systolic Blood Pressure 118 mmHg    Diastolic Blood Pressure 68 mmHg    Mean Arterial Pressure 85 mmHg    BP Site Left arm    BP Method Manual      Measurements from flowsheet : Measurements   1/2/2017 9:40 AM CST Height Measured - Standard 61.5 in    Weight Measured - Standard 149.2 lb    BSA 1.71 m2    Body Mass Index 27.73 kg/m2      Point of care testing:       Urine dipstick: Within normal limits.    General:  Alert and oriented, No acute distress.    Respiratory:  Lungs are clear to auscultation, Respirations are non-labored.    Cardiovascular:          Arterial pulses: Right, Radial, 2+.         Capillary refill: Right, Upper extremity, Within normal limits.    Gastrointestinal:  Soft, Non-tender, Non-distended.    Genitourinary:  No costovertebral angle tenderness.    Musculoskeletal:  Right fourth finger swollen and ecchymotic. Decreased ROM..    Integumentary:  No rash.    Neurologic:  No focal  deficits.       Review / Management   Results review   Radiology results   X-ray, Appears normal to my read, waiting for official read.  Will contact patient with any other findings.      Impression and Plan   Diagnosis     Dysuria ([id number] R30.0).     Finger pain, right ([id number]-VH M79.644).     Patient Instructions:       Counseled: Patient, Regarding diagnosis, Regarding treatment, Regarding medications, Regarding activity, Verbalized understanding.    RTC in 24-36 hours if not better. Push fluids. Ice to finger. Advil PRN. RTC in one week if not improved.

## 2022-02-16 NOTE — TELEPHONE ENCOUNTER
---------------------  From: Karen Roberts CMA   Sent: 9/30/2021 3:00:16 PM CDT  [facility]     Pt seen at [facility] for covid testing per Shandra Yoo. 02 Sat=97%. Pt resides in [facility]-will notify Public Health if positive.

## 2022-02-16 NOTE — PROGRESS NOTES
Chief Complaint    Pt c/o sore throat and sinus infection with ear pain. Also c/o burning pain on tongue.  History of Present Illness      Chief complaint as above reviewed and confirmed with patient.  Pt presents to the clinic with concerns re: scratchy sore throat. NO uri.  HAs a sore at the tip of the tongue. No fevers.  no cough.  Review of Systems      Review of systems is negative with the exception of those noted in HPI          Physical Exam   Vitals & Measurements    T: 97.7   F (Tympanic)  HR: 70(Peripheral)  BP: 126/72  SpO2: 99%     HT: 61.5 in  WT: 162 lb  BMI: 30.11           Vitals as above per nursing documentation           Constitutional : nad appears well          Ears: ears patent B, TMS intact, noninjected           Nose: nasal mucosa is non-edematous. no discharge           Throat: pharynx is nonerythematous, no tonsillar hypertrophy, no exudate, there is a small aphatous ulcer at the tip of the tongue.           Neck: neck supple, no adenopathy, no thyromegaly, no rigidity           Lungs: lungs CTA', no Wheezes, rhonchi or rales           Heart: heart RRR, nl S1, S2 no murmur           skin:  No rashes            rst negative         Assessment/Plan       Sore throat (J02.9)        observation, likely viral. push fluids, rest and iburpofen or tylenol.  Pt would like something for the discomfort, may use magic mouth was at the aphatous ulcer or swish and swallow for pain.                Orders:         Miscellaneous Prescription, Magic Mouthwash, 5-10cc, po, q4 hrs, Instructions: Lidocaine, Benedryl, Maalox, Compound, PRN: for mouth sore pain, # 6 oz, 1 Refill(s), Type: Maintenance, Pharmacy: [id number], 5-10cc Oral q4 hrs,PRN:for mouth sore pain,Instr:Lidocai..., (Ordered)         POC, GROUP A STREP* ([facility]), Specimen Type: Swab, Collection Date: 01/29/20 19:08:00 CST  Patient Information     Name:VERO PAGAN      Address:      [address]  [id number]     Sex:Female     YOB: 1965     [phone]     Emergency Contact:GABRIELA PAGAN     MRN:[id number]     FIN:[id number]     Location:[facility]     Date of Service:01/29/2020      Primary Care Physician:       Edith Yu MD, [phone]       Edith Yu MD, [phone]      Attending Physician:       Sabina Ortiz PA-C, [phone]  Problem List/Past Medical History    Ongoing     Anemia     HLD (Hyperlipidemia)     Migraines     Obesity     Seasonal Allergies     Tobacco abuse       Comments: Quit smoking.    Historical     Pregnancy     Pregnancy  Procedure/Surgical History     Colonoscopy (03/11/2013)      Comments: Normal colonoscopy repeat in 10 years.     Esophagogastroduodenoscopy (03/11/2013)      Comments: Normal EGD no need for repeat.  Medications    Magic Mouthwash, 5-10cc, Oral, q4 hrs, PRN, 1 refills  Allergies    Iron (Swelling)    erythromycin (GI upset)    sulfa drug  Social History    Smoking Status - 09/09/2019     Never smoker     Alcohol - Denies Alcohol Use, 09/07/2010      Never, 05/22/2014     Employment/School      Employed, 01/19/2012     Exercise - Occasional exercise, 09/07/2010      Exercise frequency: 1-2 times/week. Exercise type: Walking., 03/01/2012     Home/Environment      Marital status:  (Living together). Spouse/Partner name: Don. 2 children. Living situation: Home/Independent., 01/19/2012     Nutrition/Health      Type of diet: Regular., 01/19/2012     Other      First menses age 11. Menstrual duration 28 Days. No history of abnormal Pap., 05/22/2014     Sexual      Sexually active: Yes. Sexual orientation: Heterosexual., 05/22/2014     Substance Abuse - Denies Substance Abuse, 03/01/2012      Never, 05/22/2014     Tobacco - Past, 05/22/2014      Past, Cigarettes, 3 per day. 10 year(s)., 12/06/2013  Family History    Diabetes mellitus: Grandmother (M).    Diabetes mellitus type I: Daughter.     Heart disease: Grandmother (M).    Hypercholesterolemia: Mother and Father.    Hypertension: Mother.  Immunizations      Vaccine Date Status          pneumococcal (PPSV23) 08/15/2018 Given          tetanus/diphth/pertuss (Tdap) adult/adol 07/02/2014 Given          Td 08/29/2005 Recorded          Hep B 01/01/2001 Recorded  Lab Results       Lab Results (Last 4 results within 90 days)        Group A Strep POC: NOT DETECTED (01/29/20 19:30:00)

## 2022-02-16 NOTE — NURSING NOTE
Nurse Note  Time: 11:45 am  Note:  Pt came into the clinic with a migraine that started yesterday. Requesting a shot of Torodol 60 mg. Per [facility] standing order, ok to have a shot every 4 weeks as needed for migraine headaches. Last shot was on 4/23/18. Given per protocol.

## 2022-02-16 NOTE — TELEPHONE ENCOUNTER
---------------------  From: Brenda Paez CMA   To: Hilario Guthrie MD;     Sent: 11/14/2021 11:42:03 AM CST  Subject: COVID result- neg     Patient notified of negative COVID result via VM. May return to work/school if symptoms are improving and no fever for 24 hours.       RADHA Paez CMA

## 2022-02-16 NOTE — TELEPHONE ENCOUNTER
---------------------  From: Rene/Fouzia GOMEZ (Phone Messages Pool ([id number]_[facility]))   To: Advanced Practice Provider [place] ([address]_[facility]);     Sent: 9/30/2021 8:58:16 AM CDT  Subject: General Message     Phone Message    PCP: Asking for [facility]    Time of Call: 0824    Phone Number: [phone]    Returned call at: 0856    Note: Patient LM asking for an order for a covid test.    Call back to pt and she states that she was seen by [facility] 9/27. She states that she is still having L ear pain, sore throat, and a HA.    Is this ok to order since she was just seen or does pt need a video yolanda?---------------------  From: Shandra Van (Advanced Practice Provider [place] ([address]_[facility]))   To: Phone Eniram [place] ([address]_[place] [facility] [place]);     Sent: 9/30/2021 11:08:49 AM CDT  Subject: RE: General Message     ok to place order thanksdebbi and LM for pt to return my call at 1120  asked her to ask for me to fill out form and get her on curbsidePatient returning call at 1150. I filled out sheet and transferred to scheduling.

## 2022-02-16 NOTE — PROGRESS NOTES
[facility]. O2= 95%. Specimen sent to [facility]. Forms faxed to [facility].

## 2022-02-16 NOTE — PROGRESS NOTES
Seen for COVID and Strep testing at [facility] per Dr. Harriet Thompson    O2 Sat = 98%  (Children under 12 do not require O2 sat)    Specimen sent to:  [facility]    PUI form faxed to: [facility].

## 2022-02-16 NOTE — PROGRESS NOTES
Patient:   VERO PAGAN            MRN: [id number]            FIN: [id number]               Age:   55 years     Sex:  Female     :  1965   Associated Diagnoses:   Anemia; Sore throat   Author:   Omkar Fernandez PA-C      Visit Information      Date of Service: 2020 08:44 am  Performing Location: [facility]  Encounter#: [id number]      Primary Care Provider (PCP):  Edith Yu MD    NPI# [id number]      Referring Provider:  Omkar Fernandez PA-C    NPI# [id number]   Visit type:  Telephone Encounter.    Source of history:  Patient.    Location of patient:  Home in [place]  Call Start Time:   1115  Call End Time:    1121      Chief Complaint   Sore throat.      History of Present Illness   Today's visit was conducted via telephone due to the COVID-19 pandemic. Patient's consent to telephone visit was obtained and documented. Sore throat. HA. No fever. Mild rhinorrhea. No sinus pressure. No cough. No SOB or chest tightness. No GI or Neuro symptoms. About 12 days. Exposed to strep. No Covid exposure. No longer in health care. Finished Amoxil. Throat pain persists.       Reason for visit:  See above      Review of Systems   Constitutional:  Negative.    Eye:  Negative.    Ear/Nose/Mouth/Throat:  Negative except as documented in history of present illness.    Respiratory:  Negative.    Gastrointestinal:  Negative.    Neurologic:  Negative.       Health Status   Allergies:    Allergic Reactions (Selected)  Severity Not Documented  Iron (Swelling)  Sulfa drug (No reactions were documented)  Nonallergic Reactions (Selected)  Severity Not Documented  Erythromycin (Gi upset)   Medications:  (Selected)   Prescriptions  Prescribed  cefdinir 300 mg oral capsule: = 1 cap(s) ( 300 mg ), Oral, q12 hrs, x 10 day(s), # 20 cap(s), 0 Refill(s), Type: Acute, Pharmacy: [facility], 1 cap(s) Oral q12 hrs,x10 day(s), 61.5, in, 20 10:01:00 CDT,  Height Measu...   Problem list:    All Problems (Selected)  Seasonal Allergies / ICD-9-.9 / Confirmed  Obesity / SNOMED CT 3768513569 / Probable  Migraines / ICD-9-.90 / Confirmed  HLD (Hyperlipidemia) / ICD-9-.4 / Confirmed  Anemia / ICD-9-.9 / Confirmed      Histories   Past Medical History:    Active  HLD (Hyperlipidemia) (272.4)  Migraines (346.90)  Anemia (285.9)  Seasonal Allergies (477.9)  Resolved  Pregnancy (391924761):  Resolved on 8/2/1982 at 17 years.  Pregnancy (391734505):  Resolved on 7/6/1987 at 22 years.   Family History:    Diabetes mellitus  Grandmother (M)  Hypertension  Mother  Heart disease  Grandmother (M)  Diabetes mellitus type I  Daughter (Marixa)  Hypercholesterolemia  Mother  Father     Procedure history:    Colonoscopy (903241564) on 3/11/2013 at 47 Years.  Comments:  3/26/2013 12:38 PM Romeo Kimbrough MA colonoscopy repeat in 10 years  Esophagogastroduodenoscopy (7213483339) on 3/11/2013 at 47 Years.  Comments:  3/26/2013 12:39 PM Romeo Kimbrough MA  Normal EGD no need for repeat   Social History:        Alcohol Assessment: Denies Alcohol Use            Never      Tobacco Assessment: Past            Past, Cigarettes, 3 per day.  10 year(s).      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  Spouse/Partner name: Don.  2 children.  Living situation:               Home/Independent.                     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      Exercise and Physical Activity Assessment: Occasional exercise            Exercise  frequency: 1-2 times/week.  Exercise type: Walking.      Sexual Assessment            Sexually active: Yes.  Sexual orientation: Heterosexual.      Other Assessment            First menses age 11.  Menstrual duration 28 Days.  No history of abnormal Pap.        Health Maintenance      Recommendations     Pending (in the next year)        OverDue           Lipid Disorders Screen (Female) due  01/23/13  and every 1  year(s)           Cervical Cancer Screen (if sexually active) due  07/02/16  and every 3  year(s)           Alcohol Misuse Screen (Female) due  08/15/19  and every 1  year(s)           Depression Screen (Female) due  08/15/19  and every 1  year(s)           Breast Cancer Screen due  04/03/20  and every 1  year(s)        Due            Influenza Vaccine due  08/31/20  and every 1  year(s)           Aspirin Therapy for Prevention of CVD (Female) due  09/09/20  and every 5  year(s)           Hepatitis C Screen 9583-4420 (Female) due  09/09/20  One-time only           Lung Cancer Screen (Female) due  09/09/20  and every 1  year(s)        Due In Future            Body Mass Index Check (Female) not due until  02/18/21  and every 1  year(s)           High Blood Pressure Screen (Female) not due until  02/18/21  and every 1  year(s)           Obesity Screen and Counseling (Female) not due until  02/18/21  and every 1  year(s)     Satisfied (in the past 1 year)        Satisfied            Body Mass Index Check (Female) on  02/18/20.           Body Mass Index Check (Female) on  01/29/20.           High Blood Pressure Screen (Female) on  02/18/20.           High Blood Pressure Screen (Female) on  01/29/20.           Obesity Screen and Counseling (Female) on  02/18/20.           Obesity Screen and Counseling (Female) on  01/29/20.           Tobacco Use Screen (Female) on  09/09/20.           Tobacco Use Screen (Female) on  09/01/20.           Tobacco Use Screen (Female) on  04/28/20.           Tobacco Use Screen  (Female) on  02/18/20.          Impression and Plan   Diagnosis     Anemia (EAI50-WD D64.9).     Sore throat (IHE57-PM J02.9).     Patient Instructions:       Counseled: Patient, Regarding diagnosis, Regarding treatment, Regarding medications, Diet, Activity, Verbalized understanding.    Summary:  set up for C-19 testing. Self quarantine. Return/call if symptoms worsening, chest tightness, SOB etc. Tylenol. Fluids..    Orders     Orders (Selected)   Outpatient Orders  Ordered  Return to Clinic (Request): RFV: CBC TSH  Ordered (Dispatched)  SARS-CoV-2 RNA (COVID-19), Qualitative NAAT* ([facility]): Specimen Type: Nasopharyngeal Swab, Collection Date: 09/09/20 11:23:00 CDT  Prescriptions  Prescribed  cefdinir 300 mg oral capsule: = 1 cap(s) ( 300 mg ), Oral, q12 hrs, x 10 day(s), # 20 cap(s), 0 Refill(s), Type: Acute, Pharmacy: [facility], 1 cap(s) Oral q12 hrs,x10 day(s), 61.5, in, 09/01/20 10:01:00 CDT, Height Measu....

## 2022-02-16 NOTE — PROGRESS NOTES
Patient:   VERO PAGAN            MRN: [id number]            FIN: [id number]               Age:   56 years     Sex:  Female     :  1965   Associated Diagnoses:   Migraines   Author:   Edith Yu MD      Visit Information      Date of Service: 12/15/2021 04:10 pm  Performing Location: [facility]  Encounter#: [id number]      Chief Complaint   12/15/2021 4:24 PM CST   discuss medication for migraines      History of Present Illness   patient with increased migraines  happen twice a month, sometimes last for several days  IM toradol is effective, does not get relief from ibuprofen  does get an aura  migraines have been long term issue but more frequent currently      Health Status   Allergies:    Allergic Reactions (All)  Severity Not Documented  Iron (Swelling)  Sulfa drug (No reactions were documented)  Nonallergic Reactions (All)  Severity Not Documented  Erythromycin (Gi upset)   Medications:  (Selected)   Prescriptions  Prescribed  Zofran ODT 4 mg oral tablet, disintegrating: = 1 tab(s) ( 4 mg ), Oral, tid, PRN: nausea, # 9 tab(s), 5 Refill(s), Type: Maintenance, Pharmacy: [facility], 1 tab(s) Oral tid,PRN:nausea, 63.25, in, 21 8:12:00 CDT, Height Measured,...  pantoprazole 40 mg oral delayed release tablet: = 1 tab(s), Oral, daily, # 90 tab(s), 2 Refill(s), Type: Maintenance, Pharmacy: [facility], 1 tab(s) Oral daily, 63.25, in, 21 15:20:00 CDT, Height Measured, 153.5, lb, 21 15:20:0...,    Medications          *denotes recorded medication          Zofran ODT 4 mg oral tablet, disintegratin mg, 1 tab(s), Oral, tid, PRN: nausea, 9 tab(s), 5 Refill(s).          pantoprazole 40 mg oral delayed release tablet: 1 tab(s), Oral, daily, 90 tab(s), 2 Refill(s).       Problem list:    All Problems (Selected)  Anemia / ICD-9-.9 /  Confirmed  HLD (Hyperlipidemia) / ICD-9-.4 / Confirmed  Migraines / ICD-9-.90 / Confirmed  Obesity / SNOMED CT 6713055448 / Probable  Seasonal Allergies / ICD-9-.9 / Confirmed      Histories   Past Medical History:    Active  HLD (Hyperlipidemia) (ICD-9-.4)  Migraines (ICD-9-.90)  Anemia (ICD-9-.9)  Seasonal Allergies (ICD-9-.9)  Resolved  Pregnancy (SNOMED CT 654574664):  Resolved on 8/2/1982 at 17 years.  Pregnancy (SNOMED CT 544580836):  Resolved on 7/6/1987 at 22 years.   Family History:    Diabetes mellitus  Grandmother (M)  Aunt  Uncle  Hypertension  Mother  Heart disease  Grandmother (M)  Diabetes mellitus type I  Daughter (Marixa)  Arthritis  Mother  Father  Hypercholesterolemia  Mother  Father  Alzheimer's disease  Mother  Cancer in situ of thyroid  Mother     Procedure history:    Cardiac computed tomography for calcium scoring (2924671316) on 4/14/2021 at 55 Years.  Comments:  4/16/2021 1:48 PM [facility] - [facility]  Total Agatston calcium score is 0.  Colonoscopy (872492492) on 3/11/2013 at 47 Years.  Comments:  3/26/2013 12:38 PM Romeo Kimbrough MA  Normal colonoscopy repeat in 10 years  Esophagogastroduodenoscopy (4816420697) on 3/11/2013 at 47 Years.  Comments:  3/26/2013 12:39 PM Romeo Kimbrough MA  Normal EGD no need for repeat   Social History:        Electronic Cigarette/Vaping Assessment            Electronic Cigarette Use: Never.      Alcohol Assessment            Never      Tobacco Assessment            Former smoker, quit more than 30 days ago      Substance Abuse Assessment            Never      Home and Environment Assessment            Marital status:  (Living together).  Spouse/Partner name: Don.  2 children.  Living situation:               Home/Independent.                     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      Exercise and Physical Activity Assessment            Exercise frequency: 1-2 times/week.  Exercise type: Walking.      Sexual Assessment            Sexually active: Yes.  Sexual orientation: Heterosexual.      Other Assessment            First menses age 11.  Menstrual duration 28 Days.  No history of abnormal Pap.        Physical Examination   Vital Signs   12/15/2021 4:24 PM CST Temperature Tympanic 97.3 DegF  LOW    Peripheral Pulse Rate 68 bpm    Pulse Site Radial artery    HR Method Electronic    Systolic Blood Pressure 102 mmHg    Diastolic Blood Pressure 60 mmHg    Mean Arterial Pressure 74 mmHg    BP Site Right arm    BP Method Manual    Oxygen Saturation 99 %      Measurements from flowsheet : Measurements   12/15/2021 4:24 PM CST Height Measured - Standard 63.25 in    Weight Measured - Standard 148.9 lb    BSA 1.73 m2    Body Mass Index 26.17 kg/m2  HI      General:  Alert and oriented, No acute distress.       Review / Management   Results review:  Lab results   11/11/2021 2:30 PM CST Coronavirus SARS-CoV-2 (COVID-19) TR Negative   10/25/2021 8:58 AM CDT Cholesterol 323 mg/dL  HI    Non-  HI    HDL 69 mg/dL    Chol/HDL Ratio 4.7      HI    Triglyceride 133 mg/dL   9/30/2021 2:00 PM CDT Coronavirus SARS-CoV-2 (COVID-19) TR Negative   .       Impression and Plan   Diagnosis     Migraines (GNU58-DQ G43.909).     Plan:  will trial sumatriptan for prophylaxis and oral ketorolac when that is not effective..    Orders     Orders (Selected)   Prescriptions  Prescribed  SUMAtriptan 100 mg oral tablet: = 1 tab(s) ( 100 mg ), Oral, once, PRN: for migraine headache, # 9 tab(s), 5 Refill(s), Type: Soft Stop, Pharmacy: [facility], 1 tab(s) Oral once,PRN:for migraine headache, 63.25, in, 12/15/2...  Zofran ODT 4 mg oral tablet, disintegrating: = 1 tab(s) ( 4 mg ), Oral, tid, PRN: nausea, # 9 tab(s), 5 Refill(s), Type:  Maintenance, Pharmacy: [facility], 1 tab(s) Oral tid,PRN:nausea, 63.25, in, 12/15/21 16:24:00 CST, Height Measured,...  ketorolac 10 mg oral tablet: = 1 tab(s) ( 10 mg ), Oral, tid, Instructions: not to exceed 30 mg/day and 3 days duration for all dose forms, PRN: for pain, # 10 tab(s), 2 Refill(s), Type: Maintenance, Pharmacy: [facility]....

## 2022-02-16 NOTE — PROGRESS NOTES
Patient:   VERO PAGAN            MRN: [id number]            FIN: [id number]               Age:   52 years     Sex:  Female     :  1965   Associated Diagnoses:   Migraines; Paresthesia of left arm   Author:   Omkar Fernandez PA-C      Report Summary   Diagnosis  Migraines (EAP39-TJ G43.909).  Patient InstructionsOrders   Visit Information   Visit type:  General concerns.    Accompanied by:  No one.    Source of history:  Self, Medical record.    Referral source:  Self.    History limitation:  None.       Chief Complaint   2018 11:05 AM CDT   c/o headaches since ; seems to be constant; has had left arm numbness & tingling, worse at night        History of Present Illness             The patient presents with headache.  The headache is generalized.  The headache is described as aching.  The severity of the headache is moderate.  The headache fluctuates in intensity and is worsening.  The headache has lasted for 3.5 week(s).  Associated symptoms consist of neck pain, denies vomiting, denies dizziness, denies eye pain and denies visual disturbance.  History of Migraines. HA in now whole head. Migraines in past have been in her forehead. Some nausea. Advil, ASA, or Excedrin Migraine help. Lack of sleep. Cares for ill mother during night and then finds it hard to get back to sleep. Works for [employer] during the day. No injury. No fever or chills. Left arm paresthesia is not new, but worsening. Pain in wrist. CC above noted and confirmed with the patient..        Review of Systems   Constitutional:  Negative.    Eye:  Negative.    Ear/Nose/Mouth/Throat:  Negative.    Respiratory:  Negative.    Cardiovascular:  Negative.    Gastrointestinal:  Nausea.    Musculoskeletal:  Negative except as documented in history of present illness.    Neurologic:  Negative except as documented in history of present illness.       Health Status   Allergies:    Allergic Reactions (All)  Severity Not Documented  Iron  (Swelling)  Sulfa drug (No reactions were documented)  Nonallergic Reactions (All)  Severity Not Documented  Erythromycin (Gi upset)   Problem list:    All Problems (Selected)  Anemia / ICD-9-.9 / Confirmed  HLD (Hyperlipidemia) / ICD-9-.4 / Confirmed  Migraines / ICD-9-.90 / Confirmed  Seasonal Allergies / ICD-9-.9 / Confirmed      Histories   Past Medical History:    Active  HLD (Hyperlipidemia) (272.4)  Migraines (346.90)  Anemia (285.9)  Seasonal Allergies (477.9)  Resolved  Pregnancy ([id number]):  Resolved on 8/2/1982 at 17 years.  Pregnancy (864273492):  Resolved on 7/6/1987 at 22 years.   Family History:    Diabetes mellitus  Grandmother (M)  Hypertension  Mother  Heart disease  Grandmother (M)  Diabetes mellitus type I  Daughter (Marixa)  Hypercholesterolemia  Mother  Father     Procedure history:    Colonoscopy ([id number]) on 3/11/2013 at 47 Years.  Comments:  3/26/2013 12:38 PM - Romeo Rodgers MA  Normal colonoscopy repeat in 10 years  Esophagogastroduodenoscopy ([id number]) on 3/11/2013 at 47 Years.  Comments:  3/26/2013 12:39 PM Romeo Barth MA  Normal EGD no need for repeat   Social History:        Alcohol Assessment: Denies Alcohol Use            Never      Tobacco Assessment: Past            Past, Cigarettes, 3 per day.  10 year(s).      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  Spouse/Partner name: Don.  2 children.  Living situation:               Home/Independent.                     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      Exercise and Physical  Activity Assessment: Occasional exercise            Exercise frequency: 1-2 times/week.  Exercise type: Walking.      Sexual Assessment            Sexually active: Yes.  Sexual orientation: Heterosexual.      Other Assessment            First menses age 11.  Menstrual duration 28 Days.  No history of abnormal Pap.        Physical Examination   Vital Signs   4/23/2018 11:05 AM CDT Temperature Tympanic 96.6 DegF  LOW    Peripheral Pulse Rate 72 bpm    Pulse Site Radial artery    HR Method Manual    Systolic Blood Pressure 108 mmHg    Diastolic Blood Pressure 56 mmHg  LOW    Mean Arterial Pressure 73 mmHg    BP Site Right arm    BP Method Manual      Measurements from flowsheet : Measurements   4/23/2018 11:05 AM CDT Height Measured - Standard 61.5 in    Weight Measured - Standard 158 lb    BSA 1.76 m2    Body Mass Index 29.37 kg/m2  HI      General:  Alert and oriented, No acute distress.    Eye:  Pupils are equal, round and reactive to light, Extraocular movements are intact, Normal conjunctiva.    HENT:  Normocephalic, Tympanic membranes are clear, Oral mucosa is moist, No pharyngeal erythema.    Neck:  Supple, Non-tender, No lymphadenopathy.    Respiratory:  Lungs are clear to auscultation, Respirations are non-labored, Breath sounds are equal.    Cardiovascular:  Normal rate, Regular rhythm, No murmur.    Musculoskeletal:  Normal gait.    Neurologic:  No focal deficits, Normal deep tendon reflexes, During interview/exam the patient demonstrated grossly normal attention, concentration, speech, language, and function of CN 2-12.   .    Cognition and Speech:  Oriented, Speech clear and coherent, Functional cognition intact.    Psychiatric:  Cooperative, Appropriate mood & affect.       Impression and Plan   Diagnosis     Migraines (MOX39-XG G43.909).     Paresthesia of left arm (BSJ16-WO R20.2).     Patient Instructions:       Counseled: Patient, Regarding diagnosis, Regarding treatment, Regarding medications,  Activity, Verbalized understanding.    Orders     Orders (Selected)   Outpatient Orders  Ordered  MRI Head (Request): Priority: Routine, Instructions: W/O CONTRAST, Migraines  Paresthesia of left arm.     Wrist brace for left wrist. Toradol 60 mg IM. Due to change in HA pattern, location, frequency, will get MRI. If negative, will consider suppressive therapy with Topamax or Elavil.

## 2022-02-16 NOTE — NURSING NOTE
Comprehensive Intake Entered On:  12/20/2021 12:15 PM CST    Performed On:  12/20/2021 12:11 PM CST by Sushila Cardenas               Summary   Chief Complaint :   Pt consent to telemed visit. Pt's  COVID pos. Is asking if or when she should be tested. Has no symptoms.     Menstrual Status :   Menarcheal   Height Measured :   63.25 in(Converted to: 5 ft 3 in, 160.65 cm)    Sushila Cardenas - 12/20/2021 12:11 PM CST   Health Status   Allergies Verified? :   Yes   Medication History Verified? :   Yes   Immunizations Current :   Yes   Medical History Verified? :   Yes   Pre-Visit Planning Status :   Completed   Tobacco Use? :   Former smoker   Sushila Cardenas - 12/20/2021 12:11 PM CST   Meds / Allergies   (As Of: 12/20/2021 12:15:40 PM CST)   Allergies (Active)   erythromycin  Estimated Onset Date:   Unspecified ; Reactions:   GI upset ; Created By:   Maylin Banks; Reaction Status:   Active ; Category:   Drug ; Substance:   erythromycin ; Type:   Sensitivity ; Updated By:   Maylin Banks; Reviewed Date:   12/20/2021 12:14 PM CST      Iron  Estimated Onset Date:   Unspecified ; Reactions:   Swelling ; Created By:   Brenda Almeida CMA; Reaction Status:   Active ; Category:   Drug ; Substance:   Iron ; Type:   Allergy ; Updated By:   Brenda Almeida CMA; Reviewed Date:   12/20/2021 12:14 PM CST      sulfa drug  Estimated Onset Date:   Unspecified ; Created By:   Shandra Marshall; Reaction Status:   Active ; Category:   Drug ; Substance:   sulfa drug ; Type:   Allergy ; Updated By:   Shandra Marshall; Reviewed Date:   12/20/2021 12:14 PM CST        Medication List   (As Of: 12/20/2021 12:15:40 PM CST)   Prescription/Discharge Order    ondansetron  :   ondansetron ; Status:   Prescribed ; Ordered As Mnemonic:   Zofran ODT 4 mg oral tablet, disintegrating ; Simple Display Line:   4 mg, 1 tab(s), Oral, tid, PRN: nausea, 9 tab(s), 5 Refill(s) ; Ordering Provider:   Edith Yu MD; Catalog Code:   ondansetron ;  Order Dt/Tm:   12/15/2021 4:44:53 PM CST          ketorolac  :   ketorolac ; Status:   Prescribed ; Ordered As Mnemonic:   ketorolac 10 mg oral tablet ; Simple Display Line:   10 mg, 1 tab(s), Oral, tid, not to exceed 30 mg/day and 3 days duration for all dose forms, PRN: for pain, 10 tab(s), 2 Refill(s) ; Ordering Provider:   Edith Yu MD; Catalog Code:   ketorolac ; Order Dt/Tm:   12/15/2021 4:40:01 PM CST          SUMAtriptan  :   SUMAtriptan ; Status:   Prescribed ; Ordered As Mnemonic:   SUMAtriptan 100 mg oral tablet ; Simple Display Line:   100 mg, 1 tab(s), Oral, once, PRN: for migraine headache, 9 tab(s), 5 Refill(s) ; Ordering Provider:   Edith Yu MD; Catalog Code:   SUMAtriptan ; Order Dt/Tm:   12/15/2021 4:40:47 PM CST          pantoprazole  :   pantoprazole ; Status:   Prescribed ; Ordered As Mnemonic:   pantoprazole 40 mg oral delayed release tablet ; Simple Display Line:   1 tab(s), Oral, daily, 90 tab(s), 3 Refill(s) ; Ordering Provider:   Edith Yu MD; Catalog Code:   pantoprazole ; Order Dt/Tm:   12/15/2021 4:38:24 PM CST

## 2022-02-16 NOTE — NURSING NOTE
Comprehensive Intake Entered On:  9/1/2020 10:02 AM CDT    Performed On:  9/1/2020 10:01 AM CDT by Martha Bettencourt CMA   Chief Complaint :   c/o Sore throat and HA, denies fever; has been exposed to Strep throat   Menstrual Status :   Menarcheal   Height Measured :   61.5 in(Converted to: 5 ft 1 in, 156.21 cm)    Martha Bettencourt CMA - 9/1/2020 10:01 AM CDT   Health Status   Allergies Verified? :   Yes   Medication History Verified? :   Yes   Immunizations Current :   Yes   Medical History Verified? :   Yes   Tobacco Use? :   Never smoker   Martha Bettencourt CMA - 9/1/2020 10:01 AM CDT   Consents   Consent for Immunization Exchange :   Consent Granted   Consent for Immunizations to Providers :   Consent Granted   Martha Bettencourt CMA - 9/1/2020 10:01 AM CDT   Problems   (As Of: 9/1/2020 10:02:32 AM CDT)   Problems(Active)    Anemia (ICD-9-CM  :285.9 )  Name of Problem:   Anemia ; Recorder:   Maylin Banks; Confirmation:   Confirmed ; Classification:   Medical ; Code:   285.9 ; Contributor System:   PowerChart ; Last Updated:   1/27/2012 11:28 AM CST ; Life Cycle Date:   1/27/2012 ; Life Cycle Status:   Active ; Vocabulary:   ICD-9-CM        HLD (Hyperlipidemia) (ICD-9-CM  :272.4 )  Name of Problem:   HLD (Hyperlipidemia) ; Recorder:   Edith Yu MD; Confirmation:   Confirmed ; Classification:   Medical ; Code:   272.4 ; Contributor System:   PowerChart ; Last Updated:   2/28/2014 6:52 PM CST ; Life Cycle Date:   1/19/2012 ; Life Cycle Status:   Active ; Responsible Provider:   Edith Yu MD; Vocabulary:   ICD-9-CM        Migraines (ICD-9-CM  :346.90 )  Name of Problem:   Migraines ; Recorder:   Maylin Banks; Confirmation:   Confirmed ; Classification:   Medical ; Code:   346.90 ; Contributor System:   PowerChart ; Last Updated:   2/28/2014 6:52 PM CST ; Life Cycle Date:   1/27/2012 ; Life Cycle Status:   Active ; Vocabulary:   ICD-9-CM        Obesity (SNOMED CT  :3350902288 )  Name of  Problem:   Obesity ; Recorder:   SYSTEM; Confirmation:   Probable ; Classification:   Medical ; Code:   4538403809 ; Last Updated:   1/29/2020 7:03 PM CST ; Life Cycle Date:   1/29/2020 ; Life Cycle Status:   Active ; Vocabulary:   SNOMED CT        Seasonal Allergies (ICD-9-CM  :477.9 )  Name of Problem:   Seasonal Allergies ; Recorder:   Maylin Banks; Confirmation:   Confirmed ; Classification:   Medical ; Code:   477.9 ; Contributor System:   PowerChart ; Last Updated:   2/28/2014 6:52 PM CST ; Life Cycle Date:   1/27/2012 ; Life Cycle Status:   Active ; Vocabulary:   ICD-9-CM          Meds / Allergies   (As Of: 9/1/2020 10:02:32 AM CDT)   Allergies (Active)   erythromycin  Estimated Onset Date:   Unspecified ; Reactions:   GI upset ; Created By:   Maylin Banks; Reaction Status:   Active ; Category:   Drug ; Substance:   erythromycin ; Type:   Sensitivity ; Updated By:   Maylin Banks; Reviewed Date:   9/1/2020 10:02 AM CDT      Iron  Estimated Onset Date:   Unspecified ; Reactions:   Swelling ; Created By:   Brenda Almeida CMA; Reaction Status:   Active ; Category:   Drug ; Substance:   Iron ; Type:   Allergy ; Updated By:   Brenda Almeida CMA; Reviewed Date:   9/1/2020 10:02 AM CDT      sulfa drug  Estimated Onset Date:   Unspecified ; Created By:   Shandra Marshall; Reaction Status:   Active ; Category:   Drug ; Substance:   sulfa drug ; Type:   Allergy ; Updated By:   Shandra Marshall; Reviewed Date:   9/1/2020 10:02 AM CDT        Medication List   (As Of: 9/1/2020 10:02:32 AM CDT)   No Known Home Medications     Martha Bettencourt CMA - 9/1/2020 10:00:51 AM           ID Risk Screen   Recent Travel History :   No recent travel   Family Member Travel History :   No recent travel   Other Exposure to Infectious Disease :   Unknown   Martha Bettencourt CMA 9/1/2020 10:01 AM CDT

## 2022-02-16 NOTE — NURSING NOTE
Comprehensive Intake Entered On:  1/29/2020 7:03 PM CST    Performed On:  1/29/2020 6:59 PM CST by Sushila Christina               Summary   Chief Complaint :   Pt c/o sore throat and sinus infection with ear pain. Also c/o burning pain on tongue.    Menstrual Status :   Menarcheal   Weight Measured :   162 lb(Converted to: 162 lb 0 oz, 73.48 kg)    Height Measured :   61.5 in(Converted to: 5 ft 1 in, 156.21 cm)    Body Mass Index :   30.11 kg/m2 (HI)    Body Surface Area :   1.78 m2   Systolic Blood Pressure :   126 mmHg   Diastolic Blood Pressure :   72 mmHg   Mean Arterial Pressure :   90 mmHg   Peripheral Pulse Rate :   70 bpm   Temperature Tympanic :   97.7 DegF(Converted to: 36.5 DegC)  (LOW)    Oxygen Saturation :   99 %   Sushila Christina - 1/29/2020 6:59 PM CST   Meds / Allergies   (As Of: 1/29/2020 7:03:54 PM CST)   Allergies (Active)   erythromycin  Estimated Onset Date:   Unspecified ; Reactions:   GI upset ; Created By:   Maylin Banks; Reaction Status:   Active ; Category:   Drug ; Substance:   erythromycin ; Type:   Sensitivity ; Updated By:   Maylin Banks; Reviewed Date:   1/29/2020 7:01 PM CST      Iron  Estimated Onset Date:   Unspecified ; Reactions:   Swelling ; Created By:   Brenda Almeida CMA; Reaction Status:   Active ; Category:   Drug ; Substance:   Iron ; Type:   Allergy ; Updated By:   Brenda Almeida CMA; Reviewed Date:   1/29/2020 7:01 PM CST      sulfa drug  Estimated Onset Date:   Unspecified ; Created By:   Shandra Marshall; Reaction Status:   Active ; Category:   Drug ; Substance:   sulfa drug ; Type:   Allergy ; Updated By:   Shandra Marshall; Reviewed Date:   1/29/2020 7:01 PM CST        Medication List   (As Of: 1/29/2020 7:03:54 PM CST)   Prescription/Discharge Order    sertraline  :   sertraline ; Status:   Processing ; Ordered As Mnemonic:   sertraline 25 mg oral tablet ; Ordering Provider:   Edith Yu MD; Action Display:   Complete ; Catalog Code:   sertraline ;  Order Dt/Tm:   1/29/2020 7:01:11 PM CST          amoxicillin-clavulanate  :   amoxicillin-clavulanate ; Status:   Completed ; Ordered As Mnemonic:   Augmentin 875 mg-125 mg oral tablet ; Simple Display Line:   1 tab(s), po, bidac, for 10 day(s), 20 tab(s), 0 Refill(s) ; Ordering Provider:   Romario Brizuela MD; Catalog Code:   amoxicillin-clavulanate ; Order Dt/Tm:   9/9/2019 11:18:31 AM CDT

## 2022-02-16 NOTE — PROGRESS NOTES
Patient:   VERO PAGAN            MRN: [id number]            FIN: [id number]               Age:   55 years     Sex:  Female     :  1965   Associated Diagnoses:   Well adult exam; HLD (Hyperlipidemia); Gastritis; Left arm pain   Author:   Edith Yu MD      Visit Information   Visit type:  Annual exam.    Source of history:  Self.    History limitation:  None.       Chief Complaint   3/29/2021 8:12 AM CDT    Annual px-prevention handout reviewed. C/o L arm pain after fall early this winter. Started in wrist and has moved up to her shoulder.      Well Adult History   Well Adult History             The patient presents for well adult exam.  The patient's general health status is described as good.  The patient's diet is described as balanced.  Exercise: routine.  Last menstrual period: postmenopausal.  Additional pertinent history: Mammogram due.  Colonoscopy due in .  Discussed elevated cholesterol levels on blood work. Has history of muscle pain with statin, taken more than 10 years ago. .     Patient with chronic nausea issues. Uses ondansetron. Not affected by food. No specific time of the day. Usually starts in the morning. No pain. Had trial of omeprazole without noting relief. Does not feel like it matters whether she has eaten or not. Does have history of migraines that sometimes accompany.   Has also noticed arm pain in left hand. Started in left wrist and hand that has been worsening for several months. Has numbness and tingling in the entire hand that is worst in the morning, not noticed during the day. Pain is achy. Sometimes gets sharp and stabbing. has been progressively becoming more disruptive.      Review of Systems   All other systems reviewed and negative      Health Status   Allergies:    Allergic Reactions (Selected)  Severity Not Documented  Iron (Swelling)  Sulfa drug (No reactions were documented)  Nonallergic Reactions (Selected)  Severity Not Documented  Erythromycin (Gi  upset)   Medications:  (Selected)   Prescriptions  Prescribed  Zofran ODT 4 mg oral tablet, disintegrating: = 1 tab(s) ( 4 mg ), Oral, tid, # 9 tab(s), 3 Refill(s), Type: Maintenance, Pharmacy: [facility] Cathy Matute, 1 tab(s) Oral tid,x3 day(s), 61.5, in, 21 14:20:00 CST, Height Measured, 156, lb, ...,    Medications          *denotes recorded medication          Zofran ODT 4 mg oral tablet, disintegratin mg, 1 tab(s), Oral, tid, for 3 day(s), 9 tab(s), 3 Refill(s).       Problem list:    All Problems  Anemia / ICD-9-.9 / Confirmed  HLD (Hyperlipidemia) / ICD-9-.4 / Confirmed  Migraines / ICD-9-.90 / Confirmed  Obesity / SNOMED CT 0585964992 / Probable  Seasonal Allergies / ICD-9-.9 / Confirmed  Inactive: Tobacco abuse / ICD-9-.1  Quit smoking.  Resolved: Pregnancy / SNOMED CT 424718582  Resolved: Pregnancy / SNOMED CT 525033422      Histories   Past Medical History:    Active  HLD (Hyperlipidemia) (ICD-9-.4)  Migraines (ICD-9-.90)  Anemia (ICD-9-.9)  Seasonal Allergies (ICD-9-.9)  Resolved  Pregnancy (SNOMED CT 215529298):  Resolved on 1982 at 17 years.  Pregnancy (SNOMED CT 697341487):  Resolved on 1987 at 22 years.   Family History:    Diabetes mellitus  Grandmother (M)  Hypertension  Mother  Heart disease  Grandmother (M)  Diabetes mellitus type I  Daughter (Marixa)  Hypercholesterolemia  Mother  Father     Procedure history:    Colonoscopy ([id number]) on 3/11/2013 at 47 Years.  Comments:  3/26/2013 12:38 PM Romeo Kimbrough MA colonoscopy repeat in 10 years  Esophagogastroduodenoscopy ([id number]) on 3/11/2013 at 47 Years.  Comments:  3/26/2013 12:39 PM CDT - Vier MA, Britnie  Normal EGD no need for repeat   Social History:        Electronic Cigarette/Vaping Assessment            Electronic Cigarette Use: Never.      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  Spouse/Partner name: Da.  2 children.  Living situation:               Home/Independent.                     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      Exercise and Physical Activity Assessment: Occasional exercise            Exercise frequency: 1-2 times/week.  Exercise type: Walking.      Sexual Assessment            Sexually active: Yes.  Sexual orientation: Heterosexual.      Other Assessment            First menses age 11.  Menstrual duration 28 Days.  No history of abnormal Pap.        Physical Examination   Vital Signs   3/29/2021 8:12 AM CDT Temperature Tympanic 96.3 DegF  LOW    Peripheral Pulse Rate 66 bpm    Systolic Blood Pressure 99 mmHg    Diastolic Blood Pressure 66 mmHg    Mean Arterial Pressure 77 mmHg    BP Site Right arm    BP Method Electronic    Oxygen Saturation 100 %      Measurements from flowsheet : Measurements   3/29/2021 8:12 AM CDT Height Measured - Standard 63.25 in    Weight Measured - Standard 154 lb    BSA 1.76 m2    Body Mass Index 27.06 kg/m2  HI      General:  Alert and oriented, No acute distress.    Eye:  Pupils are equal, round and reactive to light, Extraocular movements are intact, Normal conjunctiva.    HENT:  Normocephalic, Tympanic membranes are clear, Oral mucosa is moist, No pharyngeal erythema.    Neck:  Supple, Non-tender, No lymphadenopathy, No thyromegaly.    Respiratory:  Lungs are clear to auscultation.     Cardiovascular:  Normal rate, Regular rhythm, Normal peripheral perfusion, No edema.    Breast:  No mass, No tenderness, No discharge.    Gastrointestinal:  Soft, Non-tender, Non-distended, No organomegaly.    Musculoskeletal:  No swelling, No deformity, pain with ROM at shoulder.    Integumentary:  Warm, Dry, Pink, No rash, no concerning lesions.    Neurologic:  Alert, Oriented, Normal sensory.    Psychiatric:  Cooperative, Appropriate mood & affect.       Review / Management   Results review:  Lab results   3/23/2021 8:45 AM CDT Sodium Level 136 mmol/L    Potassium Level 4.2 mmol/L    Chloride Level 101 mmol/L    CO2 Level 29 mmol/L    Glucose Level 97 mg/dL    BUN 16 mg/dL    Creatinine 0.82 mg/dL    BUN/Creat Ratio NOT APPLICABLE    eGFR 80 mL/min/1.73m2    eGFR African American 93 mL/min/1.73m2    Calcium Level 10.1 mg/dL    Cholesterol 311 mg/dL  HI    Non-  HI    HDL 76 mg/dL    Chol/HDL Ratio 4.1      HI    Triglyceride 152 mg/dL  HI    WBC 5.2    RBC 4.73    Hgb 13.7 gm/dL    Hct 41.8 %    MCV 88.4 fL    MCH 29.0 pg    MCHC 32.8 gm/dL    RDW 14.8 %    Platelet 282    MPV 9.7 fL   3/1/2021 2:56 PM CST Culture Strep A See comment   3/1/2021 2:38 PM CST Group A Strep POC NOT DETECTED   3/1/2021 2:10 PM CST Coronavirus SARS-CoV-2 (COVID-19) TR Negative   .       Impression and Plan   Diagnosis     Well adult exam (EVH17-VN Z00.00).     Course:  Progressing as expected.    Patient Instructions:       Counseled: Patient, BMI, diet, and exercise.    Diagnosis     HLD (Hyperlipidemia) (HIR79-FT E78.5).     Course:  discussed very high LDL, will plan to adjust diet and recheck in 6 months. Also discussed cardiac screening. Will do coronary calcium score.    Orders     Orders (Selected)   Outpatient Orders  Ordered  Return to Clinic (Request): RFV: fasting lab visit: lipid panel, Return in 6 months  Completed  Referral (Request): 03/29/21 12:04:00 CDT, Referred to: Radiology, Referred to: Coronary  Calcium Score, Additional instructions: @ [facility] (Hyperlipidemia).     Diagnosis     Gastritis (SJO77-ZT K29.70).     Course:  chronic nausea, has history of prior ulcer disease, no issues with anemia, if not improving on PPI may need referral for EGD.    Diagnosis     Left arm pain (SMZ18-DW M79.602).     Course:  symptoms most likely musculoskeletal, discussed options, will do physical therapy.

## 2022-02-16 NOTE — PROGRESS NOTES
Patient:   VERO PAGAN            MRN: [id number]            FIN: [id number]               Age:   53 years     Sex:  Female     :  1965   Associated Diagnoses:   Acute frontal sinusitis   Author:   Edith Yu MD      Chief Complaint   2019 9:18 AM CST    sore throat; x3 weeks; pain with urination      History of Present Illness   patient with 3 weeks of sore throat, congestion, dry hacking cough, no high fevers, has been unchanged, no improvement for 3 weeks  has also noted 2 weeks of dysuria and irritation      Health Status   Allergies:    Allergic Reactions (All)  Severity Not Documented  Iron (Swelling)  Sulfa drug (No reactions were documented)  Nonallergic Reactions (All)  Severity Not Documented  Erythromycin (Gi upset)   Medications:  (Selected)   Prescriptions  Prescribed  hydrocortisone 2.5% topical cream: 1 yolanda, TOP, TID, # 30 g, 0 Refill(s), Type: Maintenance, Pharmacy: [facility] #[id number], 1 yolanda Topical tid,    Medications          *denotes recorded medication          hydrocortisone 2.5% topical cream: 1 yolanda, TOP, TID, 30 g, 0 Refill(s).     Problem list:    All Problems (Selected)  Seasonal Allergies / ICD-9-.9 / Confirmed  Migraines / ICD-9-.90 / Confirmed  HLD (Hyperlipidemia) / ICD-9-.4 / Confirmed  Anemia / ICD-9-.9 / Confirmed      Histories   Past Medical History:    Active  HLD (Hyperlipidemia) (ICD-9-.4)  Migraines (ICD-9-.90)  Anemia (ICD-9-.9)  Seasonal Allergies (ICD-9-.9)  Resolved  Pregnancy (SNOMED CT 394243164):  Resolved on 1982 at 17 years.  Pregnancy (SNOMED CT 332865261):  Resolved on 1987 at 22 years.   Family History:    Diabetes mellitus  Grandmother (M)  Hypertension  Mother  Heart disease  Grandmother (M)  Diabetes mellitus type I  Daughter (Marixa)  Hypercholesterolemia  Mother  Father     Procedure history:    Colonoscopy ([id number]) on 3/11/2013 at 47 Years.  Comments:  3/26/2013 12:38 PM CDT  - Ana Maria GOMEZRomeo  Normal colonoscopy repeat in 10 years  Esophagogastroduodenoscopy ([id number]) on 3/11/2013 at 47 Years.  Comments:  3/26/2013 12:39 PM CDT - LeilaniRomeo barrera MA  Normal EGD no need for repeat   Social History:        Alcohol Assessment: Denies Alcohol Use            Never      Tobacco Assessment: Past            Past, Cigarettes, 3 per day.  10 year(s).      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  Spouse/Partner name: Don.  2 children.  Living situation:               Home/Independent.                     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      Exercise and Physical Activity Assessment: Occasional exercise            Exercise frequency: 1-2 times/week.  Exercise type: Walking.      Sexual Assessment            Sexually active: Yes.  Sexual orientation: Heterosexual.      Other Assessment            First menses age 11.  Menstrual duration 28 Days.  No history of abnormal Pap.      Physical Examination   Vital Signs   1/14/2019 9:18 AM CST Temperature Tympanic 96.7 DegF  LOW    Peripheral Pulse Rate 66 bpm    Pulse Site Radial artery    HR Method Manual    Systolic Blood Pressure 118 mmHg    Diastolic Blood Pressure 70 mmHg    Mean Arterial Pressure 86 mmHg    BP Site Right arm    BP Method Manual      Measurements from flowsheet : Measurements   1/14/2019 9:18 AM CST Height Measured - Standard 61.5 in    Weight Measured - Standard 161.2 lb    BSA 1.78 m2    Body Mass Index 29.96 kg/m2  HI      General:  Alert and oriented, No acute distress.    Eye:  Pupils are equal,  round and reactive to light, Normal conjunctiva.    HENT:  Normocephalic, Tympanic membranes are clear, Oral mucosa is moist, No pharyngeal erythema.         Sinus: Bilateral, Frontal sinus, Tenderness.    Neck:  Supple, Non-tender.    Respiratory:  Lungs are clear to auscultation.    Cardiovascular:  Normal rate, Regular rhythm.       Review / Management   Results review:  Lab results   1/14/2019 9:31 AM CST Urine Color Urine Dipstick Yellow    Urine Appearance Urine Dipstick Clear    pH Urine Dipstick 6    Specific Gravity Urine Dipstick 1.020    Glucose Urine Dipstick Negative    Bilirubin Urine Dipstick Negative    Ketones Urine Dipstick Negative    Blood Urine Dipstick Negative    Protein Urine Dipstick Negative    Nitrite Urine Dipstick Negative    Leukocyte Esterase Urine Dipstick Negative    Urobilinogen Urine Dipstick 0.2 mg/dl    POC Test Comments POC Test Comments   .       Impression and Plan   Diagnosis     Acute frontal sinusitis (DSW38-PY J01.10).     Course:  Not improving.    Plan:  no evidence of UTI on labs, will monitor and push fluids.    Orders     Orders   Pharmacy:  amoxicillin 875 mg oral tablet (Prescribe): = 1 tab(s) ( 875 mg ), PO, BID, x 10 day(s), # 20 tab(s), 0 Refill(s), Type: Maintenance, Pharmacy: [facility] #[id number], 1 tab(s) Oral bid,x10 day(s).

## 2022-02-16 NOTE — NURSING NOTE
Comprehensive Intake Entered On:  3/1/2021 11:25 AM CST    Performed On:  3/1/2021 11:21 AM CST by Eda Enrique CMA               Summary   Chief Complaint :   Verbal consent given for telephone visit. ST - feels like something is stuck in it, sinus congestion, facial redness, swollen glands x 10 days. No fevers. No known COVID exposure.    Menstrual Status :   Menarcheal   Eda Enrique CMA - 3/1/2021 11:21 AM CST   Health Status   Allergies Verified? :   Yes   Medication History Verified? :   Yes   Immunizations Current :   Yes   Pre-Visit Planning Status :   Not completed   Tobacco Use? :   Never smoker   Eda Enrique CMA - 3/1/2021 11:21 AM CST   Meds / Allergies   (As Of: 3/1/2021 11:25:50 AM CST)   Allergies (Active)   erythromycin  Estimated Onset Date:   Unspecified ; Reactions:   GI upset ; Created By:   Maylin Banks; Reaction Status:   Active ; Category:   Drug ; Substance:   erythromycin ; Type:   Sensitivity ; Updated By:   Maylin Banks; Reviewed Date:   2/19/2021 2:43 PM CST      Iron  Estimated Onset Date:   Unspecified ; Reactions:   Swelling ; Created By:   Brenda Almeida CMA; Reaction Status:   Active ; Category:   Drug ; Substance:   Iron ; Type:   Allergy ; Updated By:   Brenda Almeida CMA; Reviewed Date:   2/19/2021 2:43 PM CST      sulfa drug  Estimated Onset Date:   Unspecified ; Created By:   Shandra Marshall; Reaction Status:   Active ; Category:   Drug ; Substance:   sulfa drug ; Type:   Allergy ; Updated By:   Shandra Marshall; Reviewed Date:   2/19/2021 2:43 PM CST        Medication List   (As Of: 3/1/2021 11:25:50 AM CST)   Prescription/Discharge Order    ondansetron  :   ondansetron ; Status:   Prescribed ; Ordered As Mnemonic:   Zofran ODT 4 mg oral tablet, disintegrating ; Simple Display Line:   4 mg, 1 tab(s), Oral, tid, for 3 day(s), 9 tab(s), 3 Refill(s) ; Ordering Provider:   Omkar Fernandez PA-C; Catalog Code:   ondansetron ; Order Dt/Tm:   2/19/2021 2:41:03 PM CST             ID Risk Screen   Recent Travel History :   No recent travel   Family Member Travel History :   No recent travel   Other Exposure to Infectious Disease :   Unknown   COVID-19 Testing Status :   No COVID-19 test performed   Eda Enrique CMA - 3/1/2021 11:21 AM CST

## 2022-02-16 NOTE — TELEPHONE ENCOUNTER
---------------------  From: Omkar Fernandez PA-C   To: VERO PAGAN    Sent: 9/16/2020 12:04:34 PM CDT  Subject: General Message         These are fine  Results:  Date Result Name Value Ref Range   9/15/2020 2:13 PM TSH 1.66 mIU/L (0.40 - 4.50)   9/15/2020 2:13 PM WBC 6.2 (3.8 - 10.8)   9/15/2020 2:13 PM RBC 4.42 (3.80 - 5.10)   9/15/2020 2:13 PM Hgb 12.9 gm/dL (11.7 - 15.5)   9/15/2020 2:13 PM Hct 38.9 % (35.0 - 45.0)   9/15/2020 2:13 PM MCV 88.0 fL (80.0 - 100.0)   9/15/2020 2:13 PM MCH 29.2 pg (27.0 - 33.0)   9/15/2020 2:13 PM MCHC 33.2 gm/dL (32.0 - 36.0)   9/15/2020 2:13 PM RDW 12.7 % (11.0 - 15.0)   9/15/2020 2:13 PM Platelet 264 (140 - 400)   9/15/2020 2:13 PM MPV 10.1 fL (7.5 - 12.5)Patient calling at 1422 stating that she has not received her lab results yet. Patient notified at 9302. Patient requesting that these results be mailed to her. Printed out letter and sent in mail. Verified address.

## 2022-02-16 NOTE — NURSING NOTE
Rapid Strep POC Entered On:  6/4/2019 11:59 AM CDT    Performed On:  6/4/2019 11:59 AM CDT by Madeline Hastings CMA               Rapid Strep POC   Rapid Strep POC :   Negative   POC Test Comments :   S/O for confirmation   Madeline Hastings CMA - 6/4/2019 11:59 AM CDT   Details   Collection Date :   6/4/2019 11:50 AM CDT   Handling Specimen POC :   Throat   POC Test Comments :   Lab Test Performed by:   [facility] Office  [address] [place] [address]  Phone: [phone]  Fax: [phone]     Madeline Hastings CMA - 6/4/2019 11:59 AM CDT

## 2022-02-16 NOTE — TELEPHONE ENCOUNTER
---------------------  From: Edith Yu MD   To: VERO PAGAN CRISTIAN    Sent: 3/24/2021 6:58:08 AM CDT  Subject: lab results     Michael Grace.  Your labs are listed below. Your cholesterol levels are very high. Other results are normal. We'll discuss at your visit on the 29th.  Margoth Yu      Results:  Date Result Name Ind Value Ref Range   3/23/2021 8:45 AM Sodium Level  136 mmol/L (135 - 146)   3/23/2021 8:45 AM Potassium Level  4.2 mmol/L (3.5 - 5.3)   3/23/2021 8:45 AM Chloride Level  101 mmol/L (98 - 110)   3/23/2021 8:45 AM CO2 Level  29 mmol/L (20 - 32)   3/23/2021 8:45 AM Glucose Level  97 mg/dL (65 - 99)   3/23/2021 8:45 AM BUN  16 mg/dL (7 - 25)   3/23/2021 8:45 AM Creatinine Level  0.82 mg/dL (0.50 - 1.05)   3/23/2021 8:45 AM BUN/Creat Ratio  NOT APPLICABLE (6 - 22)   3/23/2021 8:45 AM eGFR  80 mL/min/1.73m2 (> OR = 60 - )   3/23/2021 8:45 AM eGFR African American  93 mL/min/1.73m2 (> OR = 60 - )   3/23/2021 8:45 AM Calcium Level  10.1 mg/dL (8.6 - 10.4)   3/23/2021 8:45 AM Cholesterol ((H)) 311 mg/dL ( - <200)   3/23/2021 8:45 AM Non-HDL Cholesterol ((H)) 235 ( - <130)   3/23/2021 8:45 AM HDL  76 mg/dL (> OR = 50 - )   3/23/2021 8:45 AM Cholesterol/HDL Ratio  4.1 ( - <5.0)   3/23/2021 8:45 AM LDL ((H)) 203    3/23/2021 8:45 AM Triglyceride ((H)) 152 mg/dL ( - <150)   3/23/2021 8:45 AM WBC  5.2 (3.8 - 10.8)   3/23/2021 8:45 AM RBC  4.73 (3.80 - 5.10)   3/23/2021 8:45 AM Hgb  13.7 gm/dL (11.7 - 15.5)   3/23/2021 8:45 AM Hct  41.8 % (35.0 - 45.0)   3/23/2021 8:45 AM MCV  88.4 fL (80.0 - 100.0)   3/23/2021 8:45 AM MCH  29.0 pg (27.0 - 33.0)   3/23/2021 8:45 AM MCHC  32.8 gm/dL (32.0 - 36.0)   3/23/2021 8:45 AM RDW  14.8 % (11.0 - 15.0)   3/23/2021 8:45 AM Platelet  282 (140 - 400)   3/23/2021 8:45 AM MPV  9.7 fL (7.5 - 12.5)

## 2022-02-16 NOTE — TELEPHONE ENCOUNTER
---------------------  From: Riri Lane RN   Sent: 10/20/2021 8:25:41 AM CDT  Subject: Fasting labs     Pt calling questioning why she needs to come in for labs when she was just here in March and she normally only comes in once a year.  Pt was reminded that she needed to re-do her lipid panel after 6 months.  Pt stated understanding and was transferred to scheduling to make the appointment.

## 2022-02-16 NOTE — TELEPHONE ENCOUNTER
---------------------  From: Riri Lane RN   Sent: 10/4/2021 9:23:33 AM CDT  Subject: Negative Covid Results      Called and spoke to pt and informed of negative/ normal test results.  Pt states she is feeling better.

## 2022-02-16 NOTE — PROGRESS NOTES
Chief Complaint    Verbal consent given for telephone visit. c/o nausea, HA, muscle aches , fatigue since 11/8/21. Was exposed to positive case on 11/6/21.  History of Present Illness       Today's visit was conducted via telephone due to the COVID-19 pandemic.  Patient's consent to telephone visit was obtained and documented.       Call Start Time:  0850       Call End Time:   0900       Patient with a history of migraine was exposed to Covid 5 days ago.  She has had more migrainous headache recently.  He has nausea with them.  She has had some fatigue and achiness.  No cough or anosmia.  No chest pain or dyspnea.  Review of Systems       No diarrhea or weight loss.  Physical Exam   Vitals & Measurements    HT: 63.25 in   Assessment/Plan       Acute URI (J06.9)          History of Covid exposure rather minimal symptoms.  We will get tested.         Ordered:          16968 physician telephone evaluation 5-10 min (Charge), Quantity: 1, Acute URI  Migraines                Migraines (G43.909)          Suggested the patient have a visit to discuss the available therapies for migraine.         Ordered:          [id number] physician telephone evaluation 5-10 min (Charge), Quantity: 1, Acute URI  Migraines           Patient Information     Name:VERO PAGAN      Address:      [address] [id number]     Sex:Female     YOB: 1965     [phone]     Emergency Contact:GABRIELA PAGAN     MRN:[id number]     FIN:[id number]     Location:[facility]     Date of Service:11/11/2021      Primary Care Physician:       Edith Yu MD, [phone]       Edith Yu MD, [phone]      Attending Physician:       Hilario Guthrie MD, [phone]  Problem List/Past Medical History    Ongoing     Anemia     HLD (Hyperlipidemia)     Migraines     Obesity     Seasonal Allergies     Tobacco abuse       Comments: Quit smoking.    Historical     Pregnancy      Pregnancy  Procedure/Surgical History     Cardiac computed tomography for calcium scoring (04/14/2021)      Comments: Total Agatston calcium score is 0..     Colonoscopy (03/11/2013)      Comments: Normal colonoscopy repeat in 10 years.     Esophagogastroduodenoscopy (03/11/2013)      Comments: Normal EGD no need for repeat.  Medications    pantoprazole 40 mg oral delayed release tablet, 1 tab(s), Oral, daily, 2 refills    Zofran ODT 4 mg oral tablet, disintegrating, 4 mg= 1 tab(s), Oral, tid, PRN, 5 refills  Allergies    Iron (Swelling)    erythromycin (GI upset)    sulfa drug  Social History    Smoking Status     Former smoker     Alcohol      Never     Electronic Cigarette/Vaping      Electronic Cigarette Use: Never.     Exercise      Exercise frequency: 1-2 times/week. Exercise type: Walking.     Home/Environment      Marital status:  (Living together). Spouse/Partner name: Don. 2 children. Living situation: Home/Independent.     Nutrition/Health      Type of diet: Regular.     Other      First menses age 11. Menstrual duration 28 Days. No history of abnormal Pap.     Sexual      Sexually active: Yes. Sexual orientation: Heterosexual.     Substance Abuse      Never     Tobacco      Former smoker, quit more than 30 days ago  Family History    Alzheimer's disease: Mother.    Arthritis: Mother and Father.    Cancer in situ of thyroid: Mother.    Diabetes mellitus: Aunt, Grandmother (M) and Uncle.    Diabetes mellitus type I: Daughter.    Heart disease: Grandmother (M).    Hypercholesterolemia: Mother and Father.    Hypertension: Mother.  Lab Results          Lab Results (Last 4 results within 90 days)           Cholesterol: 323 mg/dL High (10/25/21 08:58:00)          Non-HDL Cholesterol: 254 High (10/25/21 08:58:00)          HDL: 69 mg/dL (10/25/21 08:58:00)          Cholesterol/HDL Ratio: 4.7 (10/25/21 08:58:00)          LDL: 226 High (10/25/21 08:58:00)          Triglyceride: 133 mg/dL (10/25/21  08:58:00)          Coronavirus SARS-CoV-2 (COVID-19) TR: Negative (09/30/21 14:00:00)  Immunizations          Scheduled Immunizations          Dose Date(s)          DTaP          12/01/1969          SARS-CoV-2 (COVID-19) Moderna-1273          03/19/2021, 04/16/2021          Other Immunizations          Hep B          01/01/2001          pneumococcal (PPSV23)          08/15/2018          Td          08/29/2005          tetanus/diphth/pertuss (Tdap) adult/adol          07/02/2014

## 2022-02-16 NOTE — LETTER
(Inserted Image. Unable to display)   February 18, 2021      VERO PAGAN   [address] [place], [place] [id number]        Dear VERO,      Thank you for selecting [facility] (previously [facility]) for your healthcare needs.     Our records indicate you are due for the following services:    Annual Physical  Fasting Lab Tests ~ Please do not eat or drink anything 10 hours prior to your scheduled appointment time.                (Water and any medications that you may need are allowed unless directed otherwise.)    If you had your labs done at another facility or with Direct Access Lab Testing at [facility], please bring in a copy of the results to your next visit, mail a copy, or drop off a copy of your results to your Healthcare Provider.     You are due for lab work and an office visit; please schedule the lab appointment 1 week before the office visit.  This will assure all results are available to discuss with your Healthcare Provider during your visit.    (FYI   Regarding office visits: In some instances, a video visit or telephone visit may be offered as an option.)    **It is very helpful if you bring your medication bottles to your appointment.  This assures we have all of your current medications, including strength and dosing information, documented accurately in your medical record.    To schedule an appointment or if you have further questions, please contact your clinic at [phone].       Powered by [employer]    Sincerely,    Edith Yu M.D.

## 2022-02-16 NOTE — PROGRESS NOTES
Chief Complaint    sore throat more on the left side. hard to swallow. neck pain. headache. went to clinic a few weeks ago was tested for strep and it was negative now last night it started again. interested in getting shot for headaches.  History of Present Illness      Chief complaint as above reviewed and confirmed with patient.  Pt presents to the clinic with concerns re: L sided sore throat x 1 day feels some PND. Had similar 3 weeks ago, got better an now back today. had negative RST at that time.  no fevers. some mild uri sx, dry cough, mild PND.  Has a moderate migraine today.  This is a typical migraine for her.  Has tried OTC ibuprofen/EM at home without improvement.  Has good improvement with Toradol and wonders if she could have a shot today.  No phototphobia, phonophobia, nausea/vomiting, fevers or facial pain or pressure.  Last Toradol injection was 2 weeks ago.  Review of Systems      Review of systems is negative with the exception of those noted in HPI          Physical Exam   Vitals & Measurements    HR: 68(Peripheral)  BP: 120/80  SpO2: 98%                Vitals as above per nursing documentation           Constitutional : nad appears well      PERRL      EOMI          Ears: ears patent B, TMS intact, noninjected           Nose: nasal mucosa is non-ededmatous. no discharge           Throat: pharynx is nonerythematous, no tonsillar hypertrophy, no exudate           Neck: neck supple, no adenopathy, no thyromegaly, no rigidity           Lungs: lungs CTA', no Wheezes, rhonchi or rales           Heart: heart RRR, nl S1, S2 no murmur           skin:  No rashes            General:  Alert and oriented, No acute distress.        Neurologic:  Alert, Oriented, Muscular strength, sensation and DTR are symmetrical and WNL B.  No focal deficits, Cranial Nerves II-XII are grossly intact,        Psychiatric:  Cooperative, Appropriate mood & affect, Normal judgment.          Assessment/Plan       1. Migraine  (G43.999)         toradol as ordered.  pt tolerated well.  rest at home. fu with pcp for persistent or worsening sx.         Ordered:          ketorolac, 60 mg, im, once, (Completed)          34055 therapeutic prophylactic/dx injection subq/im (Charge), Quantity: 1, Migraine           ketorolac tromethamine inj, 15 mg (Charge), Quantity: 4, Migraine                2. Pharyngitis (J02.9)         offered RST but pt defers. no signs of tonsillitis or any abscess.  may be due to PND or viral syndrome. she is eating and drinking well.  recommend observation fluids ,rest and ibuprofen or tylenol for comfort.  Pt instructed to return to clinic for persistent or worsening symptoms.                      Patient Information     Name:VERO PAGAN      Address:      [address] [address]     Sex:Female     YOB: 1965     [phone]     Emergency Contact:GABRIELA PAGAN     MRN:[id number]     FIN:[id number]     Location:[facility]     Date of Service:07/06/2019      Primary Care Physician:       Edith Yu MD, [phone]       Edith Yu MD, [phone]      Attending Physician:       Sabina Ortiz PA-C, [phone]  Problem List/Past Medical History    Ongoing     Anemia     HLD (Hyperlipidemia)     Migraines     Seasonal Allergies     Tobacco abuse       Comments: Quit smoking.    Historical     Pregnancy     Pregnancy  Procedure/Surgical History     Colonoscopy (03/11/2013)            Comments: Normal colonoscopy repeat in 10 years.     Esophagogastroduodenoscopy (03/11/2013)            Comments: Normal EGD no need for repeat.  Medications     No medications documented  Allergies    Iron (Swelling)    erythromycin (GI upset)    sulfa drug  Social History    Smoking Status - 07/06/2019     Never smoker     Alcohol - Denies Alcohol Use, 09/07/2010      Never, 05/22/2014     Employment/School      Employed, 01/19/2012      Exercise - Occasional exercise, 09/07/2010      Exercise frequency: 1-2 times/week. Exercise type: Walking., 03/01/2012     Home/Environment      Marital status:  (Living together). Spouse/Partner name: Don. 2 children. Living situation: Home/Independent., 01/19/2012     Nutrition/Health      Type of diet: Regular., 01/19/2012     Other      First menses age 11. Menstrual duration 28 Days. No history of abnormal Pap., 05/22/2014     Sexual      Sexually active: Yes. Sexual orientation: Heterosexual., 05/22/2014     Substance Abuse - Denies Substance Abuse, 03/01/2012      Never, 05/22/2014     Tobacco - Past, 05/22/2014      Past, Cigarettes, 3 per day. 10 year(s)., 12/06/2013  Family History    Diabetes mellitus: Grandmother (M).    Diabetes mellitus type I: Daughter.    Heart disease: Grandmother (M).    Hypercholesterolemia: Mother and Father.    Hypertension: Mother.  Immunizations      Vaccine Date Status      pneumococcal (PPSV23) 08/15/2018 Given      tetanus/diphth/pertuss (Tdap) adult/adol 07/02/2014 Given      Td 08/29/2005 Recorded      Hep B 01/01/2001 Recorded  Lab Results       Lab Results (Last 4 results within 90 days)        Rapid Strep POC: Negative (06/04/19 11:59:00)       Culture Strep A: See comment (06/04/19 12:00:00)       POC Test Comments: Lab Test Performed by: [address] [id number]: [phone]: [phone] (06/04/19 11:59:00)       POC Test Comments: S/O for confirmation (06/04/19 11:59:00)

## 2022-02-16 NOTE — TELEPHONE ENCOUNTER
---------------------  From: Guerda Gan LPN (Phone Messages Pool ([id number]_[facility]))   To: Phone Messages Pool ([id number]_[place] [facility] [place]);     Sent: 11/3/2021 10:04:44 AM CDT  Subject: results     Phone Message    PCP:   ETHEL      Time of Call:  9:12am       Person Calling:  pt  Phone number:  [phone] OK to LM     Returned call at: 10:01am    Note:   Pt LM stating she had labs last week and has not  received results.   Returned call and LM letting pt know results were mailed and sent to pt portal.  Read message to pt and asked her to call back and let us know if she would like to start medication.    Last office visit and reason:  9/27/21 tooth infection with KSA

## 2022-02-16 NOTE — TELEPHONE ENCOUNTER
---------------------  From: Martha Heck LPN   Sent: 3/2/2021 3:58:40 PM CST  Subject: Covid results     LVM for patient that test results are negative.

## 2022-02-16 NOTE — PROGRESS NOTES
Patient:   VERO PAGAN            MRN: [id number]            FIN: [id number]               Age:   55 years     Sex:  Female     :  1965   Associated Diagnoses:   Pharyngitis   Author:   Omkar Fernandez PA-C      Report Summary   Diagnosis  Pharyngitis (UTE26-PX J02.9).  Patient InstructionsOrders   Visit Information      Date of Service: 2020 10:00 am  Performing Location: [facility]  Encounter#: [id number]      Primary Care Provider (PCP):  Edith Yu MD    NPI# [id number]      Referring Provider:  Omkar Fernandez PA-C    NPI# [id number]   Visit type:  Telephone Encounter.    Source of history:  Patient.    Location of patient:  Home in [place]  Call Start Time:   1004  Call End Time:    1009      Chief Complaint   Sore throat.      History of Present Illness   Today's visit was conducted via telephone due to the COVID-19 pandemic. Patient's consent to telephone visit was obtained and documented. Sore throat. HA. No fever. Mild rhinorrhea. No sinus pressure. No cough. No SOB or chest tightness. No GI or Neuro symptoms. About 5 days. Exposed to strep. No Covid exposure. No longer in health care.      Reason for visit:  See above      Review of Systems   Constitutional:  Negative.    Eye:  Negative.    Ear/Nose/Mouth/Throat:  Negative except as documented in history of present illness.    Respiratory:  Negative.    Gastrointestinal:  Negative.    Neurologic:  Negative.       Impression and Plan   Diagnosis     Pharyngitis (EFA43-TZ J02.9).     Patient Instructions:       Counseled: Patient, Regarding diagnosis, Regarding treatment, Regarding medications, Diet, Activity, Verbalized understanding.    Orders     Orders (Selected)   Prescriptions  Prescribed  amoxicillin 500 mg oral capsule: = 1 cap(s) ( 500 mg ), PO, TID, # 30 cap(s), 0 Refill(s), Type: Maintenance, Pharmacy: [facility], 1 cap(s) Oral tid,x10 day(s), 61.5, in,  09/01/20 10:01:00 CDT, Height Measured, 160, lb, 02/....     She declines Covid testing. Take medicine as prescribed, side effects discussed.  Tylenol/ibuprofen for fever and discomfort.  Push fluids.  RTC if not improving in 36-48 hours, prior if concerns as we have discussed.        Health Status   Allergies:    Allergic Reactions (Selected)  Severity Not Documented  Iron (Swelling)  Sulfa drug (No reactions were documented)  Nonallergic Reactions (Selected)  Severity Not Documented  Erythromycin (Gi upset)   Medications:  (Selected)   Prescriptions  Prescribed  amoxicillin 500 mg oral capsule: = 1 cap(s) ( 500 mg ), PO, TID, # 30 cap(s), 0 Refill(s), Type: Maintenance, Pharmacy: [facility], 1 cap(s) Oral tid,x10 day(s), 61.5, in, 09/01/20 10:01:00 CDT, Height Measured, 160, lb, 02/...   Problem list:    All Problems  Seasonal Allergies / ICD-9-.9 / Confirmed  Obesity / SNOMED CT 7367967760 / Probable  Migraines / ICD-9-.90 / Confirmed  HLD (Hyperlipidemia) / ICD-9-.4 / Confirmed  Anemia / ICD-9-.9 / Confirmed  Inactive: Tobacco abuse / ICD-9-.1  Resolved: Pregnancy / SNOMED CT 165933208  Resolved: Pregnancy / SNOMED CT 266478535      Histories   Past Medical History:    Active  HLD (Hyperlipidemia) (272.4)  Migraines (346.90)  Anemia (285.9)  Seasonal Allergies (477.9)  Resolved  Pregnancy (110736530):  Resolved on 8/2/1982 at 17 years.  Pregnancy (519604854):  Resolved on 7/6/1987 at 22 years.   Family History:    Diabetes mellitus  Grandmother (M)  Hypertension  Mother  Heart disease  Grandmother (M)  Diabetes mellitus type I  Daughter (Marixa)  Hypercholesterolemia  Mother  Father     Procedure history:    Colonoscopy (925699617) on 3/11/2013 at 47 Years.  Comments:  3/26/2013 12:38 PM CDT - Romeo Rodgers MA  Normal colonoscopy repeat in 10 years  Esophagogastroduodenoscopy ([id number]) on 3/11/2013 at 47  Years.  Comments:  3/26/2013 12:39 PM CDT - Ana Maria GOMEZRomeo  Normal EGD no need for repeat   Social History:        Alcohol Assessment: Denies Alcohol Use            Never      Tobacco Assessment: Past            Past, Cigarettes, 3 per day.  10 year(s).      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  Spouse/Partner name: Don.  2 children.  Living situation:               Home/Independent.                     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      Exercise and Physical Activity Assessment: Occasional exercise            Exercise frequency: 1-2 times/week.  Exercise type: Walking.      Sexual Assessment            Sexually active: Yes.  Sexual orientation: Heterosexual.      Other Assessment            First menses age 11.  Menstrual duration 28 Days.  No history of abnormal Pap.        Health Maintenance      Recommendations     Pending (in the next year)        OverDue           Lipid Disorders Screen (Female) due  01/23/13  and every 1  year(s)           Cervical Cancer Screen (if sexually active) due  07/02/16  and every 3  year(s)           Alcohol Misuse Screen (Female) due  08/15/19  and every 1  year(s)           Depression Screen (Female) due  08/15/19  and every 1  year(s)           Breast Cancer Screen due  04/03/20  and every 1  year(s)        Due            Influenza Vaccine due  08/31/20  and every 1  year(s)           Aspirin Therapy for Prevention of CVD (Female) due  09/01/20  and every 5  year(s)           Hepatitis C Screen 2010-7479 (Female) due  09/01/20  One-time only            Lung Cancer Screen (Female) due  09/01/20  and every 1  year(s)        Due In Future            Body Mass Index Check (Female) not due until  02/18/21  and every 1  year(s)           High Blood Pressure Screen (Female) not due until  02/18/21  and every 1  year(s)           Obesity Screen and Counseling (Female) not due until  02/18/21  and every 1  year(s)     Satisfied (in the past 1 year)        Satisfied            Body Mass Index Check (Female) on  02/18/20.           Body Mass Index Check (Female) on  01/29/20.           Body Mass Index Check (Female) on  09/09/19.           High Blood Pressure Screen (Female) on  02/18/20.           High Blood Pressure Screen (Female) on  01/29/20.           Obesity Screen and Counseling (Female) on  02/18/20.           Obesity Screen and Counseling (Female) on  01/29/20.           Obesity Screen and Counseling (Female) on  09/09/19.           Tobacco Use Screen (Female) on  09/01/20.           Tobacco Use Screen (Female) on  04/28/20.           Tobacco Use Screen (Female) on  02/18/20.           Tobacco Use Screen (Female) on  09/09/19.

## 2022-02-16 NOTE — NURSING NOTE
Comprehensive Intake Entered On:  8/19/2019 2:52 PM CDT    Performed On:  8/19/2019 2:49 PM CDT by Otilia Delgado CMA               Summary   Chief Complaint :   migrianes and hot flashes   Menstrual Status :   Menarcheal   Weight Measured :   158.2 lb(Converted to: 158 lb 3 oz, 71.76 kg)    Height Measured :   61.5 in(Converted to: 5 ft 1 in, 156.21 cm)    Body Mass Index :   29.4 kg/m2 (HI)    Body Surface Area :   1.76 m2   Systolic Blood Pressure :   102 mmHg   Diastolic Blood Pressure :   60 mmHg   Mean Arterial Pressure :   74 mmHg   Peripheral Pulse Rate :   82 bpm   Otilia Delgado CMA - 8/19/2019 2:49 PM CDT   Health Status   Allergies Verified? :   Yes   Medication History Verified? :   Yes   Immunizations Current :   Yes   Medical History Verified? :   Yes   Pre-Visit Planning Status :   Completed   Tobacco Use? :   Never smoker   Otilia Delgado CMA - 8/19/2019 2:49 PM CDT   Consents   Consent for Immunization Exchange :   Consent Granted   Consent for Immunizations to Providers :   Consent Granted   Otilia Delgado CMA - 8/19/2019 2:49 PM CDT   Meds / Allergies   (As Of: 8/19/2019 2:52:32 PM CDT)   Allergies (Active)   erythromycin  Estimated Onset Date:   Unspecified ; Reactions:   GI upset ; Created By:   Maylin Banks; Reaction Status:   Active ; Category:   Drug ; Substance:   erythromycin ; Type:   Sensitivity ; Updated By:   Maylin Banks; Reviewed Date:   8/19/2019 2:51 PM CDT      Iron  Estimated Onset Date:   Unspecified ; Reactions:   Swelling ; Created By:   Brenda Almeida CMA; Reaction Status:   Active ; Category:   Drug ; Substance:   Iron ; Type:   Allergy ; Updated By:   Brenda Almeida CMA; Reviewed Date:   8/19/2019 2:51 PM CDT      sulfa drug  Estimated Onset Date:   Unspecified ; Created By:   Shandra Marshall; Reaction Status:   Active ; Category:   Drug ; Substance:   sulfa drug ; Type:   Allergy ; Updated By:   Shandra Marshall; Reviewed Date:   8/19/2019 2:51 PM CDT        Medication List    (As Of: 8/19/2019 2:52:32 PM CDT)   No Known Home Medications     Otilia Delgado CMA - 8/19/2019 2:50:58 PM

## 2022-02-16 NOTE — TELEPHONE ENCOUNTER
---------------------  From: Edith Yu MD   To: [employer] Message Pool ([email or web address]);     Sent: 4/30/2020 8:36:17 AM CDT  Subject: General Message     Please call patient - urine testing was all normal, no evidence of an infection. If patient is still having symptoms, could consider a face to face visit with me next week for an exam.        Results:  Date Result Name Value Ref Range   4/28/2020 2:52 PM UA Color YELLOW (YELLOW - )   4/28/2020 2:52 PM UA Appear CLEAR (CLEAR - )   4/28/2020 2:52 PM UA pH 5.5 (5.0 - 8.0)   4/28/2020 2:52 PM UA Specific Gravity 1.003 (1.001 - 1.035)   4/28/2020 2:52 PM UA Glucose NEGATIVE (NEGATIVE - NEGATIVE)   4/28/2020 2:52 PM UA Bilirubin NEGATIVE (NEGATIVE - NEGATIVE)   4/28/2020 2:52 PM UA Ketones NEGATIVE (NEGATIVE - NEGATIVE)   4/28/2020 2:52 PM UA Blood NEGATIVE (NEGATIVE - NEGATIVE)   4/28/2020 2:52 PM UA Protein NEGATIVE (NEGATIVE - NEGATIVE)   4/28/2020 2:52 PM UA Nitrite NEGATIVE (NEGATIVE - NEGATIVE)   4/28/2020 2:52 PM UA Leukocyte Esterase NEGATIVE (NEGATIVE - NEGATIVE)   4/28/2020 2:52 PM UA Squamous Epithelial Cells NONE SEEN /HPF ( - < OR = 5)   4/28/2020 2:52 PM UA Hyaline Cast NONE SEEN /LPF (NONE SEEN - )   4/28/2020 2:52 PM UA WBC NONE SEEN /HPF ( - < OR = 5)   4/28/2020 2:52 PM UA RBC NONE SEEN /HPF ( - < OR = 2)   4/28/2020 2:52 PM UA Bacteria NONE SEEN /HPF (NONE SEEN - )   4/28/2020 2:52 PM Culture Urine See commentCalled & left a message for patient to call back.---------------------  From: Martha Bettencourt CMA ([employer] Message Pool ([email or web address]))   To: Phone Messages Pool ([id number]);     Sent: 4/30/2020 10:31:03 AM CDT  Subject: FW: General MessageTime: 11:39am  Note: Patient was notified of normal results. If symptoms persist consider face to face visit with [facility]. Patient understands.

## 2022-02-16 NOTE — TELEPHONE ENCOUNTER
---------------------  From: David Cunningham   To: Tevin Milner MD;     Sent: 12/10/2020 9:10:17 AM CST  Subject: Covid results     Called pt about her negative covid results. Still has some symptoms but otherwise feels fine. Told her to stay quarantined until symptom free for at least 72 hours. Pt understood and had no questions at this time.

## 2022-02-16 NOTE — TELEPHONE ENCOUNTER
---------------------  From: Nusrat Morenita NICHOLSON   Sent: 12/23/2021 8:40:08 AM CST  Subject: negative COVID results      Notified patient of negative COVID results. She had known direct household exposure (). Informed her that vaccinated individuals with a household exposure do not need to quarantine, but they should wear a mask in all public indoor spaces for 14 days after the last close contact. Informed her to let us know if she develops any sxs and could consider retesting if she does. Pt agreed with plan. She had no further questions or concerns.

## 2022-02-16 NOTE — NURSING NOTE
Comprehensive Intake Entered On:  7/6/2019 8:55 AM CDT    Performed On:  7/6/2019 8:50 AM CDT by Ree Choe LPN               Summary   Chief Complaint :   sore throat more on the left side. hard to swallow. neck pain. headache. went to clinic a few weeks ago was tested for strep and it was negative now last night it started again. interested in getting shot for headaches.   Menstrual Status :   Menarcheal   Systolic Blood Pressure :   120 mmHg   Diastolic Blood Pressure :   80 mmHg   Mean Arterial Pressure :   93 mmHg   Peripheral Pulse Rate :   68 bpm   Oxygen Saturation :   98 %   Ree Choe LPN - 7/6/2019 8:50 AM CDT   Health Status   Allergies Verified? :   Yes   Medication History Verified? :   Yes   Immunizations Current :   Yes   Medical History Verified? :   Yes   Pre-Visit Planning Status :   Completed   Tobacco Use? :   Never smoker   Ree Choe LPN - 7/6/2019 8:50 AM CDT   Consents   Consent for Immunization Exchange :   Consent Granted   Consent for Immunizations to Providers :   Consent Granted   Ree Choe LPN - 7/6/2019 8:50 AM CDT   Meds / Allergies   (As Of: 7/6/2019 8:55:46 AM CDT)   Allergies (Active)   erythromycin  Estimated Onset Date:   Unspecified ; Reactions:   GI upset ; Created By:   Maylin Banks; Reaction Status:   Active ; Category:   Drug ; Substance:   erythromycin ; Type:   Sensitivity ; Updated By:   Maylin Banks; Reviewed Date:   7/6/2019 8:51 AM CDT      Iron  Estimated Onset Date:   Unspecified ; Reactions:   Swelling ; Created By:   Brenda Almeida CMA; Reaction Status:   Active ; Category:   Drug ; Substance:   Iron ; Type:   Allergy ; Updated By:   Brenda Almeida CMA; Reviewed Date:   7/6/2019 8:51 AM CDT      sulfa drug  Estimated Onset Date:   Unspecified ; Created By:   Shandra Marshall; Reaction Status:   Active ; Category:   Drug ; Substance:   sulfa drug ; Type:   Allergy ; Updated By:   Shandra Marshall; Reviewed Date:   7/6/2019 8:51 AM CDT        Medication  List   (As Of: 7/6/2019 8:55:46 AM CDT)

## 2022-02-16 NOTE — NURSING NOTE
Comprehensive Intake Entered On:  12/8/2020 11:19 AM CST    Performed On:  12/8/2020 11:18 AM CST by Chichi Ceja CMA               Summary   Chief Complaint :   c/o sore throat and runny nose. Baby sat for a couple children recently that had colds   Menstrual Status :   Menarcheal   Height Measured :   61.5 in(Converted to: 5 ft 1 in, 156.21 cm)    Chichi Ceja CMA - 12/8/2020 11:18 AM CST   Health Status   Allergies Verified? :   Yes   Medication History Verified? :   Yes   Immunizations Current :   Yes   Pre-Visit Planning Status :   Completed   Tobacco Use? :   Never smoker   Chichi Ceja CMA - 12/8/2020 11:18 AM CST   Consents   Consent for Immunization Exchange :   Consent Granted   Consent for Immunizations to Providers :   Consent Granted   Chichi Ceja CMA - 12/8/2020 11:18 AM CST   ID Risk Screen   Recent Travel History :   No recent travel   Family Member Travel History :   No recent travel   Other Exposure to Infectious Disease :   Exposure to respiratory illness of unknown etiology   Chichi Ceja CMA - 12/8/2020 11:18 AM CST   Social History   Social History   (As Of: 12/8/2020 11:19:38 AM CST)   Alcohol:  Denies Alcohol Use      Never   (Last Updated: 5/22/2014 1:48:47 PM CDT by Mara Wesley)          Tobacco:  Past      Former smoker, quit more than 30 days ago   (Last Updated: 12/8/2020 11:19:16 AM CST by Chichi Ceja CMA)          Electronic Cigarette/Vaping:        Electronic Cigarette Use: Never.   (Last Updated: 12/8/2020 11:19:19 AM CST by Chichi Ceja CMA)          Substance Abuse:  Denies Substance Abuse      Never   (Last Updated: 5/22/2014 1:48:41 PM CDT by Mara Wesley)          Employment/School:        Employed   Comments:  1/19/2012 9:51 AM - Nasrin Machado LPN:    (Last Updated: 1/19/2012 9:51:46 AM CST by Nasrin Machado LPN)          Home/Environment:        Marital status:  (Living together).   Spouse/Partner name: Don.  2 children.  Living situation: Home/Independent.   Comments:  1/19/2012 9:52 AM - Nasrin Machado LPN: Adult children   (Last Updated: 1/19/2012 9:52:07 AM CST by Nasrin Machado LPN)          Nutrition/Health:        Type of diet: Regular.   (Last Updated: 1/19/2012 9:52:17 AM CST by Nasrin Machado LPN)          Exercise:  Occasional exercise      Exercise frequency: 1-2 times/week.  Exercise type: Walking.   (Last Updated: 3/1/2012 11:10:23 AM CST by Brenda Cook)          Sexual:        Sexually active: Yes.  Sexual orientation: Heterosexual.   (Last Updated: 5/22/2014 1:49:46 PM CDT by Mara Wesley)          Other:        First menses age 11.  Menstrual duration 28 Days.  No history of abnormal Pap.   (Last Updated: 5/22/2014 1:46:33 PM CDT by Mara Wesley)

## 2022-02-16 NOTE — LETTER
(Inserted Image. Unable to display)   September 30, 2021  VERO PAGAN   [address] [place] [id number]        Dear VERO,    Thank you for selecting [facility] for your healthcare needs.    Our records indicate you are due for the following services:     Fasting Lab Tests ~ Please do not eat or drink anything 10 hours prior to your scheduled appointment time.  (Water and any medications that you may need are allowed unless directed otherwise.)     If you had your labs done at another facility or with Direct Access Lab Testing at [facility], please bring in a copy of the results to your next visit, mail a copy, or drop off a copy of your results to your Healthcare Provider.     (FYI   Regarding office visits: In some instances, a video visit or telephone visit may be offered as an option.)    To schedule an appointment or if you have further questions, please contact your clinic at [phone].    Powered by [employer]    Sincerely,    Edith Yu MD

## 2022-02-16 NOTE — TELEPHONE ENCOUNTER
---------------------  From: Armando FLANAGANKaren   Sent: 12/22/2021 2:49:05 PM CST  Subject: [facility] Testing     Pt was seen at [facility] today per Dr. Yu. 02 Sat not taken today. Pt asymptomatic. Pt resides in [facility]-will notify Public Health if positive.

## 2022-02-16 NOTE — NURSING NOTE
Comprehensive Intake Entered On:  11/11/2021 8:41 AM CST    Performed On:  11/11/2021 8:39 AM CST by Fouzia Angelo               Summary   Chief Complaint :   Verbal consent given for telephone visit. c/o nausea, HA, muscle aches , fatigue since 11/8/21. Was exposed to positive case on 11/6/21.   Menstrual Status :   Menarcheal   Height Measured :   63.25 in(Converted to: 5 ft 3 in, 160.65 cm)    Fouzia Angelo - 11/11/2021 8:39 AM CST   Health Status   Allergies Verified? :   Yes   Medication History Verified? :   Yes   Immunizations Current :   Yes   Medical History Verified? :   Yes   Pre-Visit Planning Status :   Completed   Tobacco Use? :   Former smoker   Fouzia Angelo - 11/11/2021 8:39 AM CST   Consents   Consent for Immunization Exchange :   Consent Granted   Consent for Immunizations to Providers :   Consent Granted   Fouzia Angelo - 11/11/2021 8:39 AM CST   Meds / Allergies   (As Of: 11/11/2021 8:41:17 AM CST)   Allergies (Active)   erythromycin  Estimated Onset Date:   Unspecified ; Reactions:   GI upset ; Created By:   Maylin Banks; Reaction Status:   Active ; Category:   Drug ; Substance:   erythromycin ; Type:   Sensitivity ; Updated By:   Maylin Banks; Reviewed Date:   11/11/2021 8:41 AM CST      Iron  Estimated Onset Date:   Unspecified ; Reactions:   Swelling ; Created By:   Brenda Almeida CMA; Reaction Status:   Active ; Category:   Drug ; Substance:   Iron ; Type:   Allergy ; Updated By:   Brenda Almeida CMA; Reviewed Date:   11/11/2021 8:41 AM CST      sulfa drug  Estimated Onset Date:   Unspecified ; Created By:   Shandra Marshall; Reaction Status:   Active ; Category:   Drug ; Substance:   sulfa drug ; Type:   Allergy ; Updated By:   Shandra Marshall; Reviewed Date:   11/11/2021 8:41 AM CST        Medication List   (As Of: 11/11/2021 8:41:17 AM CST)   Prescription/Discharge Order    ondansetron  :   ondansetron ; Status:   Prescribed ; Ordered As Mnemonic:   Zofran ODT 4 mg  oral tablet, disintegrating ; Simple Display Line:   4 mg, 1 tab(s), Oral, tid, PRN: nausea, 9 tab(s), 5 Refill(s) ; Ordering Provider:   Edith Yu MD; Catalog Code:   ondansetron ; Order Dt/Tm:   3/29/2021 8:58:42 AM CDT          pantoprazole  :   pantoprazole ; Status:   Prescribed ; Ordered As Mnemonic:   pantoprazole 40 mg oral delayed release tablet ; Simple Display Line:   1 tab(s), Oral, daily, 90 tab(s), 2 Refill(s) ; Ordering Provider:   Omkar Fernandez PA-C; Catalog Code:   pantoprazole ; Order Dt/Tm:   10/19/2021 8:39:20 AM CDT

## 2022-02-16 NOTE — PROGRESS NOTES
Patient:   VERO PAGAN            MRN: [id number]            FIN: [id number]               Age:   55 years     Sex:  Female     :  1965   Associated Diagnoses:   Runny nose   Author:   Tevin Milner MD      Visit Information      Date of Service: 2020 08:47 am  Performing Location: [facility]  Encounter#: [id number]      Primary Care Provider (PCP):  Edith Yu MD    NPI# [id number]      Referring Provider:  Tevin Milner MD    NPI# [id number]      Chief Complaint   2020 11:18 AM CST   c/o sore throat and runny nose. Baby sat for a couple children recently that had colds        Subjective   Chief complaint 2020 11:18 AM CST   c/o sore throat and runny nose. Baby sat for a couple children recently that had colds  .     see chief complaint as noted above and confirmed with the patient  telephone visit 1120 to 3921      Health Status   Allergies:    Allergic Reactions (Selected)  Severity Not Documented  Iron (Swelling)  Sulfa drug (No reactions were documented)  Nonallergic Reactions (Selected)  Severity Not Documented  Erythromycin (Gi upset)   Medications:  (Selected)   Prescriptions  Prescribed  Zofran 4 mg oral tablet: = 1 tab(s) ( 4 mg ), Oral, q8 hrs, PRN: nausea, # 30 tab(s), 1 Refill(s), Type: Maintenance, Pharmacy: [facility], 1 tab(s) Oral q8 hrs,PRN:nausea, 61.5, in, 10/19/20 11:35:00 CDT, Height Camille...,    Medications          *denotes recorded medication          Zofran 4 mg oral tablet: 4 mg, 1 tab(s), Oral, q8 hrs, PRN: nausea, 30 tab(s), 1 Refill(s).       Problem list:    All Problems (Selected)  HLD (Hyperlipidemia) / 272.4 / Confirmed  Migraines / 346.90 / Confirmed  Anemia / 285.9 / Confirmed  Seasonal Allergies / 477.9 / Confirmed  Obesity / 9821265724 / Probable      Objective   Measurements from flowsheet : Measurements   2020 11:18 AM CST   Height Measured - Standard                 61.5 in        Impression and Plan   Assessment and Plan:          Diagnosis: Runny nose (VVT49-VE R09.89).         Course: discussed viral illnesses and what to expect and when to return  discussed symptom control and OTC meds  discussed handwashing and protection from spreading  will test for covid.

## 2022-02-16 NOTE — PROGRESS NOTES
Patient:   VERO PAGAN            MRN: [id number]            FIN: [id number]               Age:   55 years     Sex:  Female     :  1965   Associated Diagnoses:   Migraine headache   Author:   Omkar Fernandez PA-C      Visit Information      Date of Service: 2021 02:17 pm  Performing Location: [facility]  Encounter#: [id number]      Primary Care Provider (PCP):  Edith Yu MD    NPI# [id number]   Visit type:  General concerns.    Accompanied by:  No one.    Source of history:  Self, Medical record.    Referral source:  Self.    History limitation:  None.       Chief Complaint   2021 2:20 PM CST    c/o migrane  (Modified)      Interval History   Since the patient's last visit for a migraine:   The patient reports the migraine episodes have increased.  Compliance problems: not with diet, not with medications and not with treatment program.  Associated symptom(s) consist of sensitivity to light, nausea and no vomiting.  3rd Migraine this month. Zofran tabs don't work. Zofran ODT has worked in past. Typical pain pattern. No fever or chills. CC above noted and confirmed with the patient..     Review of Systems   Constitutional:  Negative.    Gastrointestinal:  Nausea.    Neurologic:  Negative except as documented in history of present illness.       Health Status   Allergies:    Allergic Reactions (Selected)  Severity Not Documented  Iron (Swelling)  Sulfa drug (No reactions were documented)  Nonallergic Reactions (Selected)  Severity Not Documented  Erythromycin (Gi upset)   Medications:  (Selected)   Prescriptions  Prescribed  Zofran 4 mg oral tablet: = 1 tab(s) ( 4 mg ), Oral, q8 hrs, PRN: nausea, # 30 tab(s), 1 Refill(s), Type: Maintenance, Pharmacy: [facility] [email or web address] [facility], 1 tab(s) Oral q8 hrs,PRN:nausea, 61.5, in, 10/19/20 11:35:00 CDT, Height Camille...  Zofran ODT 4 mg oral tablet, disintegrating: = 1 tab(s) ( 4 mg ), Oral,  tid, # 9 tab(s), 3 Refill(s), Type: Maintenance, Pharmacy: [facility] [employer] [facility] Cathy Matute, 1 tab(s) Oral tid,x3 day(s), 61.5, in, 02/19/21 14:20:00 CST, Height Measured, 156, lb, 02/19...   Problem list:    All Problems (Selected)  Seasonal Allergies / ICD-9-.9 / Confirmed  Obesity / SNOMED CT 4790683333 / Probable  Migraines / ICD-9-.90 / Confirmed  HLD (Hyperlipidemia) / ICD-9-.4 / Confirmed  Anemia / ICD-9-.9 / Confirmed      Histories   Past Medical History:    Active  HLD (Hyperlipidemia) (272.4)  Migraines (346.90)  Anemia (285.9)  Seasonal Allergies (477.9)  Resolved  Pregnancy (670987410):  Resolved on 8/2/1982 at 17 years.  Pregnancy (725746010):  Resolved on 7/6/1987 at 22 years.   Family History:    Diabetes mellitus  Grandmother (M)  Hypertension  Mother  Heart disease  Grandmother (M)  Diabetes mellitus type I  Daughter (Marixa)  Hypercholesterolemia  Mother  Father     Procedure history:    Colonoscopy (817860676) on 3/11/2013 at 47 Years.  Comments:  3/26/2013 12:38 PM Romeo Kimbrough MA  Normal colonoscopy repeat in 10 years  Esophagogastroduodenoscopy (5536280353) on 3/11/2013 at 47 Years.  Comments:  3/26/2013 12:39 PM Romeo Kimbrough MA  Normal EGD no need for repeat   Social History:        Electronic Cigarette/Vaping Assessment            Electronic Cigarette Use: Never.      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  Spouse/Partner name: Don.  2 children.  Living situation:                Home/Independent.                     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      Exercise and Physical Activity Assessment: Occasional exercise            Exercise frequency: 1-2 times/week.  Exercise type: Walking.      Sexual Assessment            Sexually active: Yes.  Sexual orientation: Heterosexual.      Other Assessment            First menses age 11.  Menstrual duration 28 Days.  No history of abnormal Pap.        Physical Examination   Vital Signs   2/19/2021 2:20 PM CST Temperature Tympanic 97.0 DegF  LOW    Peripheral Pulse Rate 60 bpm    HR Method Electronic    Systolic Blood Pressure 100 mmHg    Diastolic Blood Pressure 74 mmHg    Mean Arterial Pressure 83 mmHg    BP Site Right arm    BP Method Manual      Measurements from flowsheet : Measurements   2/19/2021 2:20 PM CST Height Measured - Standard 61.5 in    Weight Measured - Standard 156 lb    BSA 1.75 m2    Body Mass Index 29 kg/m2  HI      Eye:  Pupils are equal, round and reactive to light, Extraocular movements are intact.    HENT:  Normocephalic.    Neck:  Supple, Non-tender, No lymphadenopathy.    Respiratory:  Lungs are clear to auscultation, Respirations are non-labored.    Cardiovascular:  Normal rate, Regular rhythm.    Musculoskeletal:  Normal range of motion, Normal gait.    Integumentary:  No rash.    Neurologic:  No focal deficits.       Review / Management   Course:  Improving, Zofran 8 mg ODT and Toradol 60 mg IM given with good relief..       Impression and Plan   Diagnosis     Migraine headache (ODU63-IA G43.009).     FU PRN

## 2022-02-16 NOTE — LETTER
(Inserted Image. Unable to display)   August 14, 2019      VERO PAGAN   [address] [place], [place] [id number]        Dear VERO,      Thank you for selecting [facility] (previously [facility]) for your healthcare needs.     Our records indicate you are due for the following services:     Annual Physical    To schedule an appointment or if you have further questions, please contact your primary clinic:   [facility]          [phone]   [facility]    [phone]             [facility]         [phone]      Powered by [employer]    Sincerely,    Edith Yu M.D.

## 2022-02-16 NOTE — PROGRESS NOTES
Chief Complaint    Verbal consent given for telephone visit. ST - feels like something is stuck in it, sinus congestion, facial redness, swollen glands x 10 days. No fevers. No known COVID exposure.  History of Present Illness       Today's visit was conducted via telephone due to the COVID-19 pandemic. Patient's consent to telephone visit was obtained and documented.       Call Start Time:   11:37am       Call End Time:    11:52am       patient at home in [place], [place]       physician in clinic, [place], [place]       Patient is a 55-year-old female who complains of a head cold for 10 days.       She has a sore throat and she feels like there is something stuck in her throat.  The sensation is there consistently.  It feels like it is stuck near the level of her neck.  Not the back of the throat and not in the chest.       She has a headache, sinus congestion, ear pain and swollen glands.  She said no fever but occasional chills.       No cough no dyspnea.  She does feel tired.  No myalgias.       No abdominal pain, nausea, diarrhea.       No new foods or medications.  No new soaps or lotions.       She will have times that her face gets beet red.  It feels kind of itchy.  It will happen off and on and is not related to exertion.  She does also get hot flashes and is not sure if these 2 are related.  Review of Systems       Negative except as listed in HPI  Physical Exam       Telemedicine visit       She speaks in full sentences and is not short of breath.  Assessment/Plan       Exposure to COVID-19 virus (Z20.822)         Ordered:          POC, GROUP A STREP* ([facility]), Specimen Type: Swab, Collection Date: 03/01/21 11:49:00 CST          SARS-CoV-2 RNA (COVID-19), Qualitative NAAT (Request), Sore throat  Exposure to COVID-19 virus                Facial rash (R21)                Foreign body sensation in throat (R09.89)         Ordered:          Referral (Request), 03/01/21 11:51:00 CST, Referred to: Otolaryngology  (ENT), Referred to: Dr Mullins or first available, Reason for referral: foreign body sensation in throat, Priority: Soon, Additional instructions: as soon as possible please, Foreign body sensation in...                Sore throat (J02.9)         Ordered:          POC, GROUP A STREP* ([facility]), Specimen Type: Swab, Collection Date: 03/01/21 11:49:00 CST          SARS-CoV-2 RNA (COVID-19), Qualitative NAAT (Request), Sore throat  Exposure to COVID-19 virus                I would like her to come to [facility] testing today to be tested for coronavirus and strep.       I am concerned about the sensation of something being stuck in her throat and the beet red itchy face that she is getting off and on.  I would like her to be seen by an ear nose and throat doctor soon as possible and I put in a referral for that.       In the meantime I would like her to take Zyrtec daily which she has on hand and I would like her to  Benadryl to have on hand to use as needed when her face feels red and itchy.       If her symptoms are progressing and getting worse I would like her to proceed to the emergency room.       Patient verbalizes understanding.  Patient Information     Name:VERO PAGAN      Address:      [address] [id number]     Sex:Female     YOB: 1965     [phone]     Emergency Contact:GABRIELA PAGAN     MRN:[id number]     FIN:[id number]     Location:[facility]     Date of Service:03/01/2021      Primary Care Physician:       Edith Yu MD, [phone]       Edith Yu MD, [phone]      Attending Physician:       Harriet Thompson MD, [phone]  Problem List/Past Medical History    Ongoing     Anemia     HLD (Hyperlipidemia)     Migraines     Obesity     Seasonal Allergies     Tobacco abuse       Comments: Quit smoking.    Historical     Pregnancy     Pregnancy  Procedure/Surgical History     Colonoscopy (03/11/2013)             Comments: Normal colonoscopy repeat in 10 years.     Esophagogastroduodenoscopy (03/11/2013)            Comments: Normal EGD no need for repeat.  Medications    Zofran ODT 4 mg oral tablet, disintegrating, 4 mg= 1 tab(s), Oral, tid, 3 refills  Allergies    Iron (Swelling)    erythromycin (GI upset)    sulfa drug  Social History    Smoking Status     Never smoker     Alcohol - Denies Alcohol Use      Never     Electronic Cigarette/Vaping      Electronic Cigarette Use: Never.     Employment/School      Employed     Exercise - Occasional exercise      Exercise frequency: 1-2 times/week. Exercise type: Walking.     Home/Environment      Marital status:  (Living together). Spouse/Partner name: Don. 2 children. Living situation: Home/Independent.     Nutrition/Health      Type of diet: Regular.     Other      First menses age 11. Menstrual duration 28 Days. No history of abnormal Pap.     Sexual      Sexually active: Yes. Sexual orientation: Heterosexual.     Substance Abuse - Denies Substance Abuse      Never     Tobacco - Past      Former smoker, quit more than 30 days ago  Family History    Diabetes mellitus: Grandmother (M).    Diabetes mellitus type I: Daughter.    Heart disease: Grandmother (M).    Hypercholesterolemia: Mother and Father.    Hypertension: Mother.  Immunizations      Vaccine Date Status          pneumococcal (PPSV23) 08/15/2018 Given          tetanus/diphth/pertuss (Tdap) adult/adol 07/02/2014 Given          Td 08/29/2005 Recorded          Hep B 01/01/2001 Recorded          DTaP 12/01/1969 Recorded  Lab Results       Lab Results (Last 4 results within 90 days)        Coronavirus SARS-CoV-2 (COVID-19) TR: Negative (12/08/20 14:35:00)

## 2022-02-16 NOTE — LETTER
(Inserted Image. Unable to display)   March 04, 2021      VERO PAGAN   [address] [place], [place] [id number]        Dear VERO,      Thank you for selecting [facility] ([facility]viously [facility] for your healthcare needs.     Negative strep culture.       Result Name Current Result   Culture Strep A  See comment 3/1/2021       Please contact me or my assistant at [phone] if you have any questions or concerns.     Sincerely,

## 2022-02-16 NOTE — NURSING NOTE
Comprehensive Intake Entered On:  6/15/2020 9:44 AM CDT    Performed On:  6/15/2020 9:42 AM CDT by Otilia Delgado CMA               Summary   Chief Complaint :   Phone visit: sore throat, headache that started over the weekend,  rash on left hand x2 months, rash is raised, red and dry, verbal consent for phone visit, unable to do a video visit due to cell service at home   Menstrual Status :   Menarcheal   Height Measured :   61.5 in(Converted to: 5 ft 1 in, 156.21 cm)    Otilia Delgado CMA - 6/15/2020 9:42 AM CDT   Health Status   Allergies Verified? :   Yes   Medication History Verified? :   Yes   Immunizations Current :   Yes   Medical History Verified? :   Yes   Pre-Visit Planning Status :   Completed   Otilia Delgado CMA - 6/15/2020 9:42 AM CDT   Consents   Consent for Immunization Exchange :   Consent Granted   Consent for Immunizations to Providers :   Consent Granted   Otilia Delgado CMA - 6/15/2020 9:42 AM CDT   Meds / Allergies   (As Of: 6/15/2020 9:44:26 AM CDT)   Allergies (Active)   erythromycin  Estimated Onset Date:   Unspecified ; Reactions:   GI upset ; Created By:   Maylin Banks; Reaction Status:   Active ; Category:   Drug ; Substance:   erythromycin ; Type:   Sensitivity ; Updated By:   Maylin Banks; Reviewed Date:   6/15/2020 9:43 AM CDT      Iron  Estimated Onset Date:   Unspecified ; Reactions:   Swelling ; Created By:   Brenda Almeida CMA; Reaction Status:   Active ; Category:   Drug ; Substance:   Iron ; Type:   Allergy ; Updated By:   Brenda Almeida CMA; Reviewed Date:   6/15/2020 9:43 AM CDT      sulfa drug  Estimated Onset Date:   Unspecified ; Created By:   Shandra Marshall; Reaction Status:   Active ; Category:   Drug ; Substance:   sulfa drug ; Type:   Allergy ; Updated By:   Shandra Marshall; Reviewed Date:   6/15/2020 9:43 AM CDT        Medication List   (As Of: 6/15/2020 9:44:26 AM CDT)   No Known Home Medications     Otilia Delgado CMA - 6/15/2020 9:43:40 AM           ID Risk Screen    Recent Travel History :   No recent travel   Family Member Travel History :   No recent travel   Other Exposure to Infectious Disease :   Unknown   Otilia Delgado CMA - 6/15/2020 9:42 AM CDT

## 2022-02-16 NOTE — TELEPHONE ENCOUNTER
---------------------  From: Chichi Ceja CMA   To: Appointment Pool ([id number]_[place]);     Sent: 12/8/2020 11:31:46 AM CST !  Subject: General Message     Pt contact pt to schedule for curbside covid testingSCHEDULED

## 2022-02-16 NOTE — LETTER
(Inserted Image. Unable to display)   [address] [facility] [address]  [phone] (phone) [phone] (fax)  April 19, 2021        VERO PAGAN   [address] [place] [id number]      Dear VERO,      [facility]      Your CT calcium score showed a score of 0 which is lowest possible. No evidence of build up of atherosclerosis, no increased risk of heart disease noted. This is reassuring.      Please contact me or my assistant at [phone] if you have any questions or concerns.     Sincerely,      Edith Yu MD

## 2022-02-16 NOTE — TELEPHONE ENCOUNTER
---------------------  From: Hilario Guthrie MD   To: Appointment Pool ([id number]_[place]);     Cc: LLOYD Message Pool ([id number]_[place]);      Sent: 11/11/2021 9:00:01 AM CST  Subject: General Message   Actions: Notify the patient for appointment      Schedule COVID PCR please.

## 2022-02-16 NOTE — TELEPHONE ENCOUNTER
---------------------  From: Eda Enrique CMA   Sent: 10/19/2020 12:10:07 PM CDT  Subject: med admin     Zofran 4mg ODT given orally to pt at 1206 per BAM.    Lot: [id number]  Exp: 06/2021  Manu: Ravi

## 2022-02-16 NOTE — TELEPHONE ENCOUNTER
---------------------  From: Riri Lane RN   Sent: 10/4/2021 9:09:06 AM CDT  Subject: Requesting COVID Results     Pt calling stating that she had a test completed on Thursday during the day and still has not received results back.  Pt states she has a dentist appointment at 9:30 and she is driving there right now.  She hopes that we will be able to get her results prior to that time.  Pt would like us to call her cellphone.

## 2022-02-16 NOTE — PROGRESS NOTES
Patient:   VERO PAGAN            MRN: [id number]            FIN: [id number]               Age:   53 years     Sex:  Female     :  1965   Associated Diagnoses:   Well adult exam; Screening for cervical cancer; Migraines   Author:   Edith Yu MD      Visit Information   Visit type:  Annual exam.    Source of history:  Self.    History limitation:  None.       Chief Complaint   8/15/2018 10:05 AM CDT   Physical      Well Adult History   Well Adult History             The patient presents for well adult exam.  The patient's general health status is described as good.  The patient's diet is described as balanced.  Associated symptoms consist of none.  Medical encounters: none.     Notes increase in migraines recently. Had had for a while and improved, but now has become more frequent again. Have been persistent.  Notes also bilateral numbness in hands radiating up arms, worst at night, sometimes while driving. Has tried wrist splints that help. Notes that she does not currently have time to address these concerns due to mother's chronic illness. Patient has not noticed weakness. Will continue splints and let me know if there is any weakness, at which time we would need to pursue EMG.  Increased hot flashes. Ablation took care of periods but never had hot flashes until recently  Discussed preventive services      Review of Systems   ROS reviewed as documented in chart      Health Status   Allergies:    Allergic Reactions (Selected)  Severity Not Documented  Iron (Swelling)  Sulfa drug (No reactions were documented)  Nonallergic Reactions (Selected)  Severity Not Documented  Erythromycin (Gi upset)   Medications:  (Selected)      Problem list:    All Problems  Seasonal Allergies / ICD-9-.9 / Confirmed  Migraines / ICD-9-.90 / Confirmed  HLD (Hyperlipidemia) / ICD-9-.4 / Confirmed  Anemia / ICD-9-.9 / Confirmed  Inactive: Tobacco abuse / ICD-9-.1  Resolved: Pregnancy /  SNOMED CT 128619443  Resolved: Pregnancy / SNOMED CT 818343824      Histories   Past Medical History:    Active  HLD (Hyperlipidemia) (ICD-9-.4)  Migraines (ICD-9-.90)  Anemia (ICD-9-.9)  Seasonal Allergies (ICD-9-.9)  Resolved  Pregnancy (SNOMED CT 865718678):  Resolved on 8/2/1982 at 17 years.  Pregnancy (SNOMED CT 268869459):  Resolved on 7/6/1987 at 22 years.   Family History:    Diabetes mellitus  Grandmother (M)  Hypertension  Mother  Heart disease  Grandmother (M)  Diabetes mellitus type I  Daughter (Marixa)  Hypercholesterolemia  Mother  Father     Procedure history:    Colonoscopy (804527645) on 3/11/2013 at 47 Years.  Comments:  3/26/2013 12:38 PM Romeo Barth MA  Normal colonoscopy repeat in 10 years  Esophagogastroduodenoscopy (4562406640) on 3/11/2013 at 47 Years.  Comments:  3/26/2013 12:39 PM - Romeo Rodgers MA  Normal EGD no need for repeat   Social History:        Alcohol Assessment: Denies Alcohol Use            Never      Tobacco Assessment: Past            Past, Cigarettes, 3 per day.  10 year(s).      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  Spouse/Partner name: Don.  2 children.  Living situation:               Home/Independent.                     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      Exercise and Physical Activity Assessment: Occasional exercise            Exercise frequency: 1-2 times/week.  Exercise type: Walking.      Sexual Assessment            Sexually active: Yes.  Sexual orientation: Heterosexual.      Other Assessment             First menses age 11.  Menstrual duration 28 Days.  No history of abnormal Pap.        Physical Examination   Vital Signs   8/15/2018 10:05 AM CDT Temperature Tympanic 98.0 DegF    Peripheral Pulse Rate 66 bpm    Pulse Site Radial artery    HR Method Manual    Systolic Blood Pressure 110 mmHg    Diastolic Blood Pressure 70 mmHg    Mean Arterial Pressure 83 mmHg    BP Site Right arm    BP Method Manual      Measurements from flowsheet : Measurements   8/15/2018 10:05 AM CDT Height Measured - Standard 61.5 in    Weight Measured - Standard 157.2 lb    BSA 1.76 m2    Body Mass Index 29.22 kg/m2  HI      General:  Alert and oriented, No acute distress.    Eye:  Pupils are equal, round and reactive to light, Extraocular movements are intact, Normal conjunctiva.    HENT:  Normocephalic, Tympanic membranes are clear, Oral mucosa is moist, No pharyngeal erythema.    Neck:  Supple, Non-tender, No lymphadenopathy, No thyromegaly.    Respiratory:  Lungs are clear to auscultation.    Cardiovascular:  Normal rate, Regular rhythm.    Breast:  No mass, No tenderness, No discharge.    Gastrointestinal:  Soft, Non-tender, Non-distended, No organomegaly.    Genitourinary:  Normal genitalia for age and sex, No urethral discharge, No lesions.         Perineum: Within normal limits.         Vagina: Within normal limits.         Uterus: Within normal limits.         Ovaries: Within normal limits.         Adnexa: Within normal limits.    Musculoskeletal:  Normal range of motion, Normal strength.    Integumentary:  Warm, Dry, Pink, No rash, no concerning lesions.    Neurologic:  Alert, Oriented, Normal sensory.    Psychiatric:  Cooperative, Appropriate mood & affect.       Review / Management   Results review      Impression and Plan   Diagnosis     Well adult exam (KJD65-VS Z00.00).     Screening for cervical cancer (EBO36-AC Z12.4).     Migraines (FQA92-WT G43.909).     Course:  Progressing as expected.    Patient Instructions:        Counseled: Patient, BMI, diet, and exercise.    Orders     Orders (Selected)   Outpatient Orders  Ordered  Return to Clinic (Request): RFV: Annual px, Return in 1 year  Return to Clinic (Request): RFV: STANDING ORDER: okay for nurse visit no more than every 4 weeks for toradol 60mg IM for intractable migraine  Ordered (In Transit)  ThinPrep Imaging Pap and HPV mRNA E6/E7* ([facility]): Specimen Type: Pap, Collection Date: 08/15/18 10:54:00 CDT.

## 2022-02-16 NOTE — TELEPHONE ENCOUNTER
---------------------  From: Fouzia Angelo (Phone Messages Pool ([id number] - [place]))   To: ETHEL Message Pool ([id number]_[facility]);     Sent: 4/16/2021 10:47:43 AM CDT  Subject: General Message     Phone Message    PCP: ETHEL    Time of Call: 9605    Phone Number: [phone]    Returned call at: n/a    Note: Patient calls [facility] and she is [facility] if results are back.    Told pt that it looks like we do not have results yet. Told pt she could call and have them re faxed to us. Patient stated that she will call them and have them re fax.     Patient aware that [facility] oc today and is fine with waiting until [facility] in clinic for a response.     Please advise.Spoke to pt and provided results of coronary calcium score per results letter from 4/19

## 2022-02-16 NOTE — NURSING NOTE
Comprehensive Intake Entered On:  11/27/2021 10:53 AM CST    Performed On:  11/27/2021 10:51 AM CST by Basil Clay               Summary   Chief Complaint :   C/o migraine headache   Menstrual Status :   Menarcheal   Weight Measured :   148.9 lb(Converted to: 148 lb 14 oz, 67.540 kg)    Height Measured :   63.25 in(Converted to: 5 ft 3 in, 160.65 cm)    Body Mass Index :   26.17 kg/m2 (HI)    Body Surface Area :   1.73 m2   Systolic Blood Pressure :   110 mmHg   Diastolic Blood Pressure :   72 mmHg   Mean Arterial Pressure :   85 mmHg   Peripheral Pulse Rate :   62 bpm   BP Site :   Right arm   BP Method :   Manual   HR Method :   Electronic   Oxygen Saturation :   99 %   Basil Clay - 11/27/2021 10:51 AM CST   Health Status   Allergies Verified? :   Yes   Medication History Verified? :   Yes   Immunizations Current :   Yes   Medical History Verified? :   Yes   Basil Clay - 11/27/2021 10:51 AM CST   Consents   Consent for Immunization Exchange :   Consent Granted   Consent for Immunizations to Providers :   Consent Granted   Basil Clay - 11/27/2021 10:51 AM CST   Meds / Allergies   (As Of: 11/27/2021 10:53:48 AM CST)   Allergies (Active)   erythromycin  Estimated Onset Date:   Unspecified ; Reactions:   GI upset ; Created By:   Maylin Banks; Reaction Status:   Active ; Category:   Drug ; Substance:   erythromycin ; Type:   Sensitivity ; Updated By:   Maylin Banks; Reviewed Date:   11/27/2021 10:53 AM CST      Iron  Estimated Onset Date:   Unspecified ; Reactions:   Swelling ; Created By:   Brenda Almeida CMA; Reaction Status:   Active ; Category:   Drug ; Substance:   Iron ; Type:   Allergy ; Updated By:   Brenda Almeida CMA; Reviewed Date:   11/27/2021 10:53 AM CST      sulfa drug  Estimated Onset Date:   Unspecified ; Created By:   Shandra Marshall; Reaction Status:   Active ; Category:   Drug ; Substance:   sulfa drug ; Type:   Allergy ; Updated By:    Shandra Marshall; Reviewed Date:   11/27/2021 10:53 AM CST        Medication List   (As Of: 11/27/2021 10:53:48 AM CST)   Prescription/Discharge Order    pantoprazole  :   pantoprazole ; Status:   Prescribed ; Ordered As Mnemonic:   pantoprazole 40 mg oral delayed release tablet ; Simple Display Line:   1 tab(s), Oral, daily, 90 tab(s), 2 Refill(s) ; Ordering Provider:   Omakr Fernandez PA-C; Catalog Code:   pantoprazole ; Order Dt/Tm:   10/19/2021 8:39:20 AM CDT          ondansetron  :   ondansetron ; Status:   Prescribed ; Ordered As Mnemonic:   Zofran ODT 4 mg oral tablet, disintegrating ; Simple Display Line:   4 mg, 1 tab(s), Oral, tid, PRN: nausea, 9 tab(s), 5 Refill(s) ; Ordering Provider:   Edith Yu MD; Catalog Code:   ondansetron ; Order Dt/Tm:   3/29/2021 8:58:42 AM CDT

## 2022-02-16 NOTE — TELEPHONE ENCOUNTER
---------------------  From: Omkar Fernandez PA-C   To: [facility] ([id number] [facility] [place]);     Sent: 9/9/2020 11:21:43 AM CDT  Subject: General Message     Please call to set up for C-19 test    IVÁN

## 2022-02-16 NOTE — NURSING NOTE
Hearing and Vision Screening Entered On:  3/29/2021 12:26 PM CDT    Performed On:  3/29/2021 12:26 PM CDT by Maryan Lara CMA               Hearing and Vision Screening   Audiogram Result Right Ear :   Pass   Audiogram Result Left Ear :   Pass   Maryan Lara CMA - 3/29/2021 12:26 PM CDT

## 2022-02-16 NOTE — NURSING NOTE
Comprehensive Intake Entered On:  9/9/2019 10:55 AM CDT    Performed On:  9/9/2019 10:52 AM CDT by Annia Montero CMA               Summary   Chief Complaint :   Pt here for HA, congestion and ST   Menstrual Status :   Menarcheal   Weight Measured :   158 lb(Converted to: 158 lb 0 oz, 71.67 kg)    Height Measured :   61.5 in(Converted to: 5 ft 1 in, 156.21 cm)    Body Mass Index :   29.37 kg/m2 (HI)    Body Surface Area :   1.76 m2   Peripheral Pulse Rate :   73 bpm   Temperature Tympanic :   97.5 DegF(Converted to: 36.4 DegC)  (LOW)    Oxygen Saturation :   99 %   Annia Montero CMA - 9/9/2019 10:52 AM CDT   Health Status   Allergies Verified? :   Yes   Medication History Verified? :   Yes   Immunizations Current :   Yes   Medical History Verified? :   Yes   Pre-Visit Planning Status :   Completed   Tobacco Use? :   Never smoker   Annia Montero CMA - 9/9/2019 10:52 AM CDT   Consents   Consent for Immunization Exchange :   Consent Granted   Consent for Immunizations to Providers :   Consent Granted   Annia Montero CMA - 9/9/2019 10:52 AM CDT   Meds / Allergies   (As Of: 9/9/2019 10:55:34 AM CDT)   Allergies (Active)   erythromycin  Estimated Onset Date:   Unspecified ; Reactions:   GI upset ; Created By:   Maylin Banks; Reaction Status:   Active ; Category:   Drug ; Substance:   erythromycin ; Type:   Sensitivity ; Updated By:   Maylin Banks; Reviewed Date:   9/9/2019 10:52 AM CDT      Iron  Estimated Onset Date:   Unspecified ; Reactions:   Swelling ; Created By:   Brenda Almeida CMA; Reaction Status:   Active ; Category:   Drug ; Substance:   Iron ; Type:   Allergy ; Updated By:   Brenda Almeida CMA; Reviewed Date:   9/9/2019 10:52 AM CDT      sulfa drug  Estimated Onset Date:   Unspecified ; Created By:   Shandra Marshall; Reaction Status:   Active ; Category:   Drug ; Substance:   sulfa drug ; Type:   Allergy ; Updated By:   Shandra Marshall; Reviewed Date:   9/9/2019 10:52 AM CDT        Medication List    (As Of: 9/9/2019 10:55:34 AM CDT)   Prescription/Discharge Order    sertraline  :   sertraline ; Status:   Prescribed ; Ordered As Mnemonic:   sertraline 25 mg oral tablet ; Simple Display Line:   25 mg, 1 tab(s), Oral, daily, 30 tab(s), 5 Refill(s) ; Ordering Provider:   Edith Yu MD; Catalog Code:   sertraline ; Order Dt/Tm:   8/19/2019 3:12:06 PM

## 2022-02-16 NOTE — PROCEDURES
Accession Number:       [id number]  CLINICAL INFORMATION::     None given  LMP::     ABLATION  PREV. PAP::     7/2/13  PREV. BX::     NONE GIVEN  SOURCE::     Cervix, Endocervix  STATEMENT OF ADEQUACY::     Satisfactory for evaluation. Endocervical/transformation zone component present.  INTERPRETATION/RESULT::     Negative for intraepithelial lesion or malignancy.  COMMENT::     This Pap test has been evaluated with computer assisted technology.  CYTOTECHNOLOGIST::     LILY BRYANT(ASCP) CT Screening location: [place] [address]  COMMENT:     See comment       EXPLANATORY NOTE:         The Pap is a screening test for cervical cancer. It is       not a diagnostic test and is subject to false negative       and false positive results. It is most reliable when a       satisfactory sample, regularly obtained, is submitted       with relevant clinical findings and history, and when       the Pap result is evaluated along with historic and       current clinical information.  HPV mRNA E6/E7:     Not Detected       This test was performed using the APTIMA HPV Assay ([employer]       This assay detects E6/E7 viral messenger RNA (mRNA) from 14       high-risk HPV types (16,18,31,33,35,39,45,51,52,56,58,59,66,68).         The analytical performance characteristics of       this assay have been determined by [employer]. The modifications have not been       cleared or approved by the FDA. This assay has       been validated pursuant to the CLIA regulations       and is used for clinical purposes.

## 2022-02-16 NOTE — PROGRESS NOTES
Patient:   VERO PAGAN            MRN: [id number]            FIN: [id number]               Age:   55 years     Sex:  Female     :  1965   Associated Diagnoses:   Sore throat; Rash of hands   Author:   Edith Yu MD      Visit Information   Visit type:  Telephone Encounter.    Source of history:  Patient.    Location of patient:  home  Call Start Time:   1051 am  Call End Time:    _1058 am      Chief Complaint   6/15/2020 9:42 AM CDT    Phone visit: sore throat, headache that started over the weekend,  rash on left hand x2 months, rash is raised, red and dry, verbal consent for phone visit, unable to do a video visit due to cell service at home   _      History of Present Illness   Today's visit was conducted via telephone due to the COVID-19 pandemic. Patient's consent to telephone visit was obtained and documented.      Reason for visit:  sore throat for past 2 days  has had sore throat, headache  exposed to strep from her sister and also cared for a great nephew with purulent nasal drainage and cough  no fever, no cough    rash on left hand near wrist  red, raised, dry and scaly  sometimes itchy  tried topical antibiotic cream and vitamin E cream, no improvement      Impression and Plan   Diagnosis     Sore throat (OXH25-VC J02.9).     Course:  Worsening.    Plan:  given exposure to strep, will treat empirically. Patient will monitor symptoms and if not resolving or if new symptoms will let us know.    Orders     Orders   Pharmacy:  amoxicillin 875 mg oral tablet (Prescribe): = 1 tab(s) ( 875 mg ), Oral, bid, x 10 day(s), # 20 tab(s), 0 Refill(s), Type: Acute, Pharmacy: [facility] #[id number], 1 tab(s) Oral bid,x10 day(s), 61.5, in, 6/15/2020 9:42 AM CDT, Height Measured, 160, lb, 2020 3:33 PM CST, Weight Measured.     Diagnosis     Rash of hands (PYM88-RR R21).     Orders     Orders   Pharmacy:  Lotrisone 1%-0.05% topical cream (Prescribe): 1 yolanda, Topical, bid, # 45 gm, 0 Refill(s),  Type: Acute, Pharmacy: [facility] #[id number], 1 yolanda Topical bid, 61.5, in, 6/15/2020 9:42 AM CDT, Height Measured, 160, lb, 2/18/2020 3:33 PM CST, Weight Measured.        Health Status   Allergies:    Allergic Reactions (Selected)  Severity Not Documented  Iron (Swelling)  Sulfa drug (No reactions were documented)  Nonallergic Reactions (Selected)  Severity Not Documented  Erythromycin (Gi upset)   Medications:  (Selected)      Problem list:    All Problems  Seasonal Allergies / ICD-9-.9 / Confirmed  Obesity / SNOMED CT 1501025869 / Probable  Migraines / ICD-9-.90 / Confirmed  HLD (Hyperlipidemia) / ICD-9-.4 / Confirmed  Anemia / ICD-9-.9 / Confirmed      Histories   Past Medical History:    Active  HLD (Hyperlipidemia) (ICD-9-.4)  Migraines (ICD-9-.90)  Anemia (ICD-9-.9)  Seasonal Allergies (ICD-9-.9)  Resolved  Pregnancy (SNOMED CT 476501413):  Resolved on 8/2/1982 at 17 years.  Pregnancy (SNOMED CT 046758009):  Resolved on 7/6/1987 at 22 years.   Social History:        Alcohol Assessment: Denies Alcohol Use            Never      Tobacco Assessment: Past            Past, Cigarettes, 3 per day.  10 year(s).      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  Spouse/Partner name: Don.  2 children.  Living situation:               Home/Independent.                     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      Exercise and Physical Activity Assessment: Occasional exercise            Exercise frequency: 1-2 times/week.  Exercise type: Walking.       Sexual Assessment            Sexually active: Yes.  Sexual orientation: Heterosexual.      Other Assessment            First menses age 11.  Menstrual duration 28 Days.  No history of abnormal Pap.        Physical Examination   Measurements from flowsheet : Measurements   6/15/2020 9:42 AM CDT    Height Measured - Standard                61.5 in        Review / Management   Results review:  Lab results   4/28/2020 2:52 PM CDT UA Color YELLOW    UA Appear CLEAR    UA pH 5.5    UA Specific Gravity 1.003    UA Glucose NEGATIVE    UA Bilirubin NEGATIVE    UA Ketones NEGATIVE    UA Blood NEGATIVE    UA Protein NEGATIVE    UA Nitrite NEGATIVE    UA Leuk Est NEGATIVE    UA Squam Epithelial NONE SEEN /HPF    UA Hyaline Cast NONE SEEN /LPF    UA WBC NONE SEEN /HPF    UA RBC NONE SEEN /HPF    UA Bacteria NONE SEEN /HPF    Culture Urine See comment   .

## 2022-02-16 NOTE — TELEPHONE ENCOUNTER
---------------------  From: Armando FLANAGAN Karen   Sent: 11/11/2021 2:34:33 PM CST  [facility]     [facility] was seen at [facility] today for covid testing per Dr. Guthrie. 02 Sat=96%. Pt resides in [facility]-will notify [facility] if positive.

## 2022-02-16 NOTE — NURSING NOTE
Comprehensive Intake Entered On:  2/18/2020 3:38 PM CST    Performed On:  2/18/2020 3:33 PM CST by Jayshree Iniguez MA               Summary   Chief Complaint :   Left ear pain, did have some drainage x x 3 weeks    Menstrual Status :   Menarcheal   Weight Measured :   160 lb(Converted to: 160 lb 0 oz, 72.57 kg)    Height Measured :   61.5 in(Converted to: 5 ft 1 in, 156.21 cm)    Body Mass Index :   29.74 kg/m2 (HI)    Body Surface Area :   1.77 m2   Systolic Blood Pressure :   102 mmHg   Diastolic Blood Pressure :   58 mmHg (LOW)    Mean Arterial Pressure :   73 mmHg   Peripheral Pulse Rate :   68 bpm   BP Site :   Left arm   Pulse Site :   Radial artery   BP Method :   Manual   HR Method :   Manual   Temperature Tympanic :   97.1 DegF(Converted to: 36.2 DegC)  (LOW)    Jayshree Iniguez MA - 2/18/2020 3:33 PM CST   Health Status   Allergies Verified? :   Yes   Medication History Verified? :   Yes   Immunizations Current :   Yes   Medical History Verified? :   Yes   Pre-Visit Planning Status :   Completed   Tobacco Use? :   Never smoker   Jayshree Iniguez MA - 2/18/2020 3:33 PM CST   Consents   Consent for Immunization Exchange :   Consent Granted   Consent for Immunizations to Providers :   Consent Granted   Jayshree Iniguez MA - 2/18/2020 3:33 PM CST   Meds / Allergies   (As Of: 2/18/2020 3:38:04 PM CST)   Allergies (Active)   erythromycin  Estimated Onset Date:   Unspecified ; Reactions:   GI upset ; Created By:   Maylin Banks; Reaction Status:   Active ; Category:   Drug ; Substance:   erythromycin ; Type:   Sensitivity ; Updated By:   Maylin Banks; Reviewed Date:   2/18/2020 3:36 PM CST      Iron  Estimated Onset Date:   Unspecified ; Reactions:   Swelling ; Created By:   LANCE Almeida CMA, Jessica; Reaction Status:   Active ; Category:   Drug ; Substance:   Iron ; Type:   Allergy ; Updated By:   LANCE Almeida CMA, Jessica; Reviewed Date:   2/18/2020 3:36 PM CST      sulfa drug  Estimated Onset Date:   Unspecified  ; Created By:   Shandra Marshall; Reaction Status:   Active ; Category:   Drug ; Substance:   sulfa drug ; Type:   Allergy ; Updated By:   Shandra Marshall; Reviewed Date:   2/18/2020 3:36 PM CST        Medication List   (As Of: 2/18/2020 3:38:04 PM CST)   Prescription/Discharge Order    Miscellaneous Prescription  :   Miscellaneous Prescription ; Status:   Prescribed ; Ordered As Mnemonic:   Magic Mouthwash ; Simple Display Line:   5-10cc, po, q4 hrs, Lidocaine, Benedryl, Maalox, PRN: for mouth sore pain, 6 oz, 1 Refill(s) ; Ordering Provider:   Sabina Ortiz PA-C; Catalog Code:   Miscellaneous Prescription ; Order Dt/Tm:   1/29/2020 7:43:35 PM CST

## 2022-02-16 NOTE — NURSING NOTE
Comprehensive Intake Entered On:  4/28/2020 10:54 AM CDT    Performed On:  4/28/2020 10:53 AM CDT by Martha Bettencourt CMA   Chief Complaint :   c/o burning and pain with urination, some itching; verbal consent for telemedicine.   Menstrual Status :   Menarcheal   Height Measured :   61.5 in(Converted to: 5 ft 1 in, 156.21 cm)    Martha Bettencourt CMA - 4/28/2020 10:53 AM CDT   Health Status   Allergies Verified? :   Yes   Medication History Verified? :   Yes   Immunizations Current :   Yes   Medical History Verified? :   Yes   Tobacco Use? :   Never smoker   Martha Bettencourt CMA - 4/28/2020 10:53 AM CDT   Consents   Consent for Immunization Exchange :   Consent Granted   Consent for Immunizations to Providers :   Consent Granted   Martha Bettencourt CMA - 4/28/2020 10:53 AM CDT   Meds / Allergies   (As Of: 4/28/2020 10:54:20 AM CDT)   Allergies (Active)   erythromycin  Estimated Onset Date:   Unspecified ; Reactions:   GI upset ; Created By:   Maylin Banks; Reaction Status:   Active ; Category:   Drug ; Substance:   erythromycin ; Type:   Sensitivity ; Updated By:   Maylin Banks; Reviewed Date:   4/28/2020 10:54 AM CDT      Iron  Estimated Onset Date:   Unspecified ; Reactions:   Swelling ; Created By:   Brenda Almeida CMA; Reaction Status:   Active ; Category:   Drug ; Substance:   Iron ; Type:   Allergy ; Updated By:   Brenda Almeida CMA; Reviewed Date:   4/28/2020 10:54 AM CDT      sulfa drug  Estimated Onset Date:   Unspecified ; Created By:   Shandra Marshall; Reaction Status:   Active ; Category:   Drug ; Substance:   sulfa drug ; Type:   Allergy ; Updated By:   Shandra Marshall; Reviewed Date:   4/28/2020 10:54 AM CDT        Medication List   (As Of: 4/28/2020 10:54:20 AM CDT)   No Known Home Medications     Martha Bettencourt CMA - 4/28/2020 10:54:16 AM

## 2022-02-16 NOTE — TELEPHONE ENCOUNTER
---------------------  From: Edith Yu MD   To: LESLY PAGAN    Sent: 10/26/2021 1:30:34 PM CDT  Subject: results     Lesly,  Your labs have gotten a little worse since March. No improvement. Given levels, medication is recommended. Please let me know if you are willing to proceed.  Margoth Yu      Results:  Date Result Name Ind Value Ref Range   10/25/2021 8:58 AM Cholesterol ((H)) 323 mg/dL ( - <200)   10/25/2021 8:58 AM HDL  69 mg/dL (> OR = 50 - )   10/25/2021 8:58 AM Triglyceride  133 mg/dL ( - <150)   10/25/2021 8:58 AM LDL ((H)) 226    10/25/2021 8:58 AM Cholesterol/HDL Ratio  4.7 ( - <5.0)   10/25/2021 8:58 AM Non-HDL Cholesterol ((H)) 254 ( - <130)

## 2022-02-16 NOTE — TELEPHONE ENCOUNTER
---------------------  From: Shalom Auguste LPN   To: [facility] Message Pool ([id number]);     Sent: 8/10/2021 10:05:23 AM CDT  Subject: General Message     Pt left message requesting a renewal of PRN Toradol shot be renewed so that she may make an appt and come in for that.  Please forward to appt pool if approved.    Last appt., 3-29-21, Annual exam---------------------  From: Maryan Lara CMA ([facility] Message Pool ([id number]))   To: Edith Yu MD;     Sent: 8/10/2021 10:14:35 AM CDT  Subject: FW: General MessageOK for toradol 60mg IM [id number] prn pain. If needing more frequently, should be seen.---------------------  From: Edith Yu MD   To: [facility] Message Pool ([id number]);     Sent: 8/10/2021 11:31:18 AM CDT  Subject: RE: General Message---------------------  From: Maryan Lara CMA ([facility] Message Pool ([id number]))   To: Phone Messages Pool ([id number]);     Sent: 8/10/2021 11:34:53 AM CDT  Subject: FW: General Message---------------------  From: Guerda Gan LPN (Phone Messages Pool ([id number]))   To: Appointment Pool ([address] [place] [address]);     Sent: 8/10/2021 11:55:04 AM CDT  Subject: FW: General Message     RTC placed. Please call pt to schedule Toradol injectionLVM x1 for pt to call and schedulePatient was seen on [phone]

## 2022-02-16 NOTE — NURSING NOTE
Comprehensive Intake Entered On:  12/15/2021 4:30 PM CST    Performed On:  12/15/2021 4:24 PM CST by Morenita Nunez               Summary   Chief Complaint :   discuss medication for migraines   Menstrual Status :   Menarcheal   Weight Measured :   148.9 lb(Converted to: 148 lb 14 oz, 67.540 kg)    Height Measured :   63.25 in(Converted to: 5 ft 3 in, 160.65 cm)    Body Mass Index :   26.17 kg/m2 (HI)    Body Surface Area :   1.73 m2   Systolic Blood Pressure :   102 mmHg   Diastolic Blood Pressure :   60 mmHg   Mean Arterial Pressure :   74 mmHg   Peripheral Pulse Rate :   68 bpm   BP Site :   Right arm   Pulse Site :   Radial artery   BP Method :   Manual   HR Method :   Electronic   Temperature Tympanic :   97.3 DegF(Converted to: 36.3 DegC)  (LOW)    Oxygen Saturation :   99 %   Morenita Nunez - 12/15/2021 4:24 PM CST   Health Status   Allergies Verified? :   Yes   Medication History Verified? :   No   Immunizations Current :   Yes   Medical History Verified? :   Yes   Pre-Visit Planning Status :   Completed   Tobacco Use? :   Former smoker   Morenita Nunez - 12/15/2021 4:24 PM CST   Consents   Consent for Immunization Exchange :   Consent Granted   Consent for Immunizations to Providers :   Consent Granted   Morenita Nunez - 12/15/2021 4:24 PM CST   Meds / Allergies   (As Of: 12/15/2021 4:30:45 PM CST)   Allergies (Active)   erythromycin  Estimated Onset Date:   Unspecified ; Reactions:   GI upset ; Created By:   Maylin Banks; Reaction Status:   Active ; Category:   Drug ; Substance:   erythromycin ; Type:   Sensitivity ; Updated By:   Maylin Banks; Reviewed Date:   12/15/2021 4:30 PM CST      Iron  Estimated Onset Date:   Unspecified ; Reactions:   Swelling ; Created By:   Brenda Almeida CMA; Reaction Status:   Active ; Category:   Drug ; Substance:   Iron ; Type:   Allergy ; Updated By:   Brenda Almeida CMA; Reviewed Date:   12/15/2021 4:30 PM CST      sulfa drug  Estimated Onset Date:    Unspecified ; Created By:   Shandra Marshall; Reaction Status:   Active ; Category:   Drug ; Substance:   sulfa drug ; Type:   Allergy ; Updated By:   Shandra Marshall; Reviewed Date:   12/15/2021 4:30 PM CST        Medication List   (As Of: 12/15/2021 4:30:45 PM CST)   Prescription/Discharge Order    pantoprazole  :   pantoprazole ; Status:   Prescribed ; Ordered As Mnemonic:   pantoprazole 40 mg oral delayed release tablet ; Simple Display Line:   1 tab(s), Oral, daily, 90 tab(s), 2 Refill(s) ; Ordering Provider:   Omkar Fernandez PA-C; Catalog Code:   pantoprazole ; Order Dt/Tm:   10/19/2021 8:39:20 AM CDT          ondansetron  :   ondansetron ; Status:   Prescribed ; Ordered As Mnemonic:   Zofran ODT 4 mg oral tablet, disintegrating ; Simple Display Line:   4 mg, 1 tab(s), Oral, tid, PRN: nausea, 9 tab(s), 5 Refill(s) ; Ordering Provider:   Edith Yu MD; Catalog Code:   ondansetron ; Order Dt/Tm:   3/29/2021 8:58:42 AM CDT            Social History   Social History   (As Of: 12/15/2021 4:30:45 PM CST)   Alcohol:        Never   (Last Updated: 5/22/2014 1:48:47 PM CDT by Mara Wesley)          Tobacco:        Former smoker, quit more than 30 days ago   (Last Updated: 12/15/2021 4:25:07 PM CST by Morenita Nunez)          Electronic Cigarette/Vaping:        Electronic Cigarette Use: Never.   (Last Updated: 3/30/2021 2:56:13 PM CDT by Jessi Christensen)          Substance Abuse:        Never   (Last Updated: 5/22/2014 1:48:41 PM CDT by Mara Wesley)          Home/Environment:        Marital status:  (Living together).  Spouse/Partner name: Don.  2 children.  Living situation: Home/Independent.   Comments:  1/19/2012 9:52 AM - Nasrin Machado LPN: Adult children   (Last Updated: 3/30/2021 2:56:29 PM CDT by Jessi Christensen)          Nutrition/Health:        Type of diet: Regular.   (Last Updated: 3/30/2021 2:56:33 PM CDT by Jessi Christensen)          Exercise:        Exercise frequency: 1-2 times/week.  Exercise type:  Walking.   (Last Updated: 3/1/2012 11:10:23 AM CST by Brenda Cook)          Sexual:        Sexually active: Yes.  Sexual orientation: Heterosexual.   (Last Updated: 5/22/2014 1:49:46 PM CDT by Mara Wesley)          Other:        First menses age 11.  Menstrual duration 28 Days.  No history of abnormal Pap.   (Last Updated: 5/22/2014 1:46:33 PM CDT by Mara Wesley)

## 2022-02-16 NOTE — PROGRESS NOTES
Patient:   VERO PAGAN            MRN: [id number]            FIN: [id number]               Age:   53 years     Sex:  Female     :  1965   Associated Diagnoses:   Contact dermatitis   Author:   Omkar Fernandez PA-C      Report Summary   Diagnosis  Contact dermatitis (DPQ08-KG L25.9).  Patient InstructionsOrders   Visit Information      Date of Service: 2018 08:08 am  Performing Location: [facility]  Encounter#: [id number]      Primary Care Provider (PCP):  Edith Yu MD    NPI# [id number]      Referring Provider:  Omkar Fernandez PA-C    NPI# [id number]      Chief Complaint   2018 8:13 AM CST   Rash on face x 2 weeks, spreading and burning        History of Present Illness             The patient presents with rash.  The location of the rash is bilaterally on the, face.  The rash is described as red and itching.  The severity of the symptom(s) associated to the rash is moderate.  The rash has lasted for 2 week(s).  Working at the [employer]. CC above noted and confirmed with the patient..        Review of Systems   Constitutional:  Negative except as documented in history of present illness.    Respiratory:  Negative.    Cardiovascular:  Negative.    Integumentary:  Negative except as documented in history of present illness.    Neurologic:  Negative except as documented in history of present illness.       Health Status   Allergies:    Allergic Reactions (All)  Severity Not Documented  Iron (Swelling)  Sulfa drug (No reactions were documented)  Nonallergic Reactions (All)  Severity Not Documented  Erythromycin (Gi upset)   Problem list:    All Problems (Selected)  Seasonal Allergies / ICD-9-.9 / Confirmed  Migraines / ICD-9-.90 / Confirmed  HLD (Hyperlipidemia) / ICD-9-.4 / Confirmed  Anemia / ICD-9-.9 / Confirmed   Medications:  (Selected)   Prescriptions  Prescribed  hydrocortisone 2.5% topical cream: 1 yolanda, TOP, TID, # 30 g, 0 Refill(s), Type:  Maintenance, Pharmacy: [facility] #[id number], 1 yolanda Topical tid  hydrocortisone/neomycin/polymyxin B 1%-0.35%-10,000 units/mL otic solution: 2 drop(s), Ear-Right, QID, # 10 mL, 0 Refill(s), Type: Maintenance, Pharmacy: [facility] #[id number], 2 drop(s) Ear-Right qid,x7 day(s),    Medications          *denotes recorded medication          hydrocortisone 2.5% topical cream: 1 yolanda, TOP, TID, 30 g, 0 Refill(s).          hydrocortisone/neomycin/polymyxin B 1%-0.35%-10,000 units/mL otic solution: 2 drop(s), Ear-Right, QID, for 7 day(s), 10 mL, 0 Refill(s).        Histories   Past Medical History:    Active  HLD (Hyperlipidemia) (272.4)  Migraines (346.90)  Anemia (285.9)  Seasonal Allergies (477.9)  Resolved  Pregnancy (072367977):  Resolved on 8/2/1982 at 17 years.  Pregnancy (238999871):  Resolved on 7/6/1987 at 22 years.   Family History:    Diabetes mellitus  Grandmother (M)  Hypertension  Mother  Heart disease  Grandmother (M)  Diabetes mellitus type I  Daughter (Marixa)  Hypercholesterolemia  Mother  Father     Procedure history:    Colonoscopy ([id number]) on 3/11/2013 at 47 Years.  Comments:  3/26/2013 12:38 PM Romeo Kimbrough MA  Normal colonoscopy repeat in 10 years  Esophagogastroduodenoscopy (1819290051) on 3/11/2013 at 47 Years.  Comments:  3/26/2013 12:39 PM Romeo Kimbrough MA  Normal EGD no need for repeat   Social History:        Alcohol Assessment: Denies Alcohol Use            Never      Tobacco Assessment: Past            Past, Cigarettes, 3 per day.  10 year(s).      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  Spouse/Partner name: Don.  2 children.  Living situation:               Home/Independent.                      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      Exercise and Physical Activity Assessment: Occasional exercise            Exercise frequency: 1-2 times/week.  Exercise type: Walking.      Sexual Assessment            Sexually active: Yes.  Sexual orientation: Heterosexual.      Other Assessment            First menses age 11.  Menstrual duration 28 Days.  No history of abnormal Pap.      Physical Examination   Vital Signs   12/17/2018 8:13 AM CST Temperature Tympanic 96.4 DegF  LOW    Peripheral Pulse Rate 64 bpm    Pulse Site Radial artery    HR Method Manual    Systolic Blood Pressure 100 mmHg    Diastolic Blood Pressure 60 mmHg    Mean Arterial Pressure 73 mmHg    BP Site Left arm    BP Method Manual      Measurements from flowsheet : Measurements   12/17/2018 8:13 AM CST   Weight Measured - Standard                160.6 lb     General:  Alert and oriented, No acute distress.    Respiratory:  Respirations are non-labored.    Integumentary:  Warm, Intact, Erythematous papules on the face. No pustules or vesicles. .    Psychiatric:  Cooperative, Appropriate mood & affect.       Impression and Plan   Diagnosis     Contact dermatitis (ULQ54-DG L25.9).     Patient Instructions:       Counseled: Patient, Regarding diagnosis, Regarding medications, Activity, Verbalized understanding.    Orders     Orders (Selected)   Prescriptions  Prescribed  hydrocortisone 2.5% topical cream: 1 yolanda, TOP, TID, # 30 g, 0 Refill(s), Type: Maintenance, Pharmacy: [facility] #[id number], 1 yolanda Topical tid.     FU PRN

## 2022-02-16 NOTE — TELEPHONE ENCOUNTER
---------------------  From: Harriet Thompson MD   To: Appointment Pool ([id number]);     Sent: 3/1/2021 12:01:53 PM CST  Subject: General Message     Please schedule patient for curbside testing today.  Thank you!  Harriet Thompson, [facility]

## 2022-02-16 NOTE — PROGRESS NOTES
Patient:   VERO PAGAN            MRN: [id number]            FIN: [id number]               Age:   54 years     Sex:  Female     :  1965   Associated Diagnoses:   Maxillary sinusitis   Author:   Orlando Daugherty MD      Visit Information      Primary Care Provider (PCP):  Edith Yu MD# [id number]   Source of history:  Self.    Referral source   History limitation:  None.       Chief Complaint   2020 3:33 PM CST    Left ear pain, did have some drainage x x 3 weeks     Sinus Pressure      History of Present Illness             The patient presents with facial pain.  The onset was gradual.  There were exacerbating factors including touch.  The severity is moderate.  The symptom occurs intermittently.  The course is worsening.  The effect on daily activities is no change in activity level and no change in sleeping patterns.               The patient presents with nasal congestion.  It is described as a sensation of pressure and feeling plugged.  The symptom occurs constantly.               The patient presents with rhinorrhea.        Review of Systems   Constitutional:  Negative.    Respiratory:  Negative.    Cardiovascular:  Negative.       Health Status   Allergies:    Allergic Reactions (Selected)  Severity Not Documented  Iron (Swelling)  Sulfa drug (No reactions were documented)  Nonallergic Reactions (Selected)  Severity Not Documented  Erythromycin (Gi upset)   Medications:  (Selected)   Prescriptions  Prescribed  Magic Mouthwash: Magic Mouthwash, 5-10cc, po, q4 hrs, Instructions: Lidocaine, Benedryl, Maalox, Compound, PRN: for mouth sore pain, # 6 oz, 1 Refill(s), Type: Maintenance, Pharmacy: [facility] #[id number], 5-10cc Oral q4 hrs,PRN:for mouth sore pain,Instr:Lidocai...   Problem list:    All Problems  Anemia / ICD-9-.9 / Confirmed  HLD (Hyperlipidemia) / ICD-9-.4 / Confirmed  Migraines / ICD-9-.90 / Confirmed  Obesity / SNOMED CT 2256100174 /  Probable  Seasonal Allergies / ICD-9-.9 / Confirmed  Inactive: Tobacco abuse / ICD-9-.1  Resolved: Pregnancy / SNOMED CT 054402977  Resolved: Pregnancy / SNOMED CT 246441978      Histories   Past Medical History:    Active  HLD (Hyperlipidemia) (ICD-9-.4)  Migraines (ICD-9-.90)  Anemia (ICD-9-.9)  Seasonal Allergies (ICD-9-.9)  Resolved  Pregnancy (SNOMED CT 931020493):  Resolved on 8/2/1982 at 17 years.  Pregnancy (SNOMED CT 360006149):  Resolved on 7/6/1987 at 22 years.   Family History:    Diabetes mellitus  Grandmother (M)  Hypertension  Mother  Heart disease  Grandmother (M)  Diabetes mellitus type I  Daughter (Marixa)  Hypercholesterolemia  Mother  Father     Procedure history:    Colonoscopy (SNOMED CT 914470400) performed by Uriel Alfonso MD on 3/11/2013 at 47 Years.  Comments:  3/26/2013 12:38 PM Romeo Kimbrough MA colonoscopy repeat in 10 years  Esophagogastroduodenoscopy (SNOMED CT 4190081575) performed by Uriel Alfonso MD on 3/11/2013 at 47 Years.  Comments:  3/26/2013 12:39 PM Romeo Kimbrough MA  Normal EGD no need for repeat   Social History:        Alcohol Assessment: Denies Alcohol Use            Never      Tobacco Assessment: Past            Past, Cigarettes, 3 per day.  10 year(s).      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  Spouse/Partner name: Don.  2 children.  Living situation:               Home/Independent.                     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      Exercise and Physical Activity Assessment:  Occasional exercise            Exercise frequency: 1-2 times/week.  Exercise type: Walking.      Sexual Assessment            Sexually active: Yes.  Sexual orientation: Heterosexual.      Other Assessment            First menses age 11.  Menstrual duration 28 Days.  No history of abnormal Pap.  ,        Alcohol Assessment: Denies Alcohol Use            Never      Tobacco Assessment: Past            Past, Cigarettes, 3 per day.  10 year(s).      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  Spouse/Partner name: Don.  2 children.  Living situation:               Home/Independent.                     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      Exercise and Physical Activity Assessment: Occasional exercise            Exercise frequency: 1-2 times/week.  Exercise type: Walking.      Sexual Assessment            Sexually active: Yes.  Sexual orientation: Heterosexual.      Other Assessment            First menses age 11.  Menstrual duration 28 Days.  No history of abnormal Pap.        Physical Examination   Vital Signs   2/18/2020 3:33 PM CST Temperature Tympanic 97.1 DegF  LOW    Peripheral Pulse Rate 68 bpm    Pulse Site Radial artery    HR Method Manual    Systolic Blood Pressure 102 mmHg    Diastolic Blood Pressure 58 mmHg  LOW    Mean Arterial Pressure 73 mmHg    BP Site Left arm    BP Method Manual      Measurements from flowsheet : Measurements   2/18/2020 3:33 PM CST Height Measured - Standard 61.5 in    Weight Measured - Standard 160 lb    BSA 1.77 m2    Body Mass Index 29.74 kg/m2  HI       General:  Alert and oriented, No acute distress.    Eye:  Normal conjunctiva.    HENT:  Tympanic membranes are clear.         Nose: Discharge ( Moderate amount ), Turbinates ( Boggy, Erythematous ).         Sinus: Tenderness.         Mouth: Within normal limits.         Throat: Pharynx ( Posterior, Cobblestoning ).         Glands: Benign reactive lymphadenopathy.    Neck:  Supple, Benign reactive lymphadenopathy.    Respiratory:  Breath sounds are equal, Symmetrical chest wall expansion.         Respirations: Are within normal limits.         Pattern: Regular.         Breath sounds: Bilateral, Within normal limits.    Cardiovascular:  Normal rate, Regular rhythm, No murmur, Good pulses equal in all extremities, Normal peripheral perfusion, No edema.    Integumentary:  Warm.    Neurologic:  Alert, Oriented.    Psychiatric:  Cooperative, Normal judgment.       Impression and Plan   Diagnosis     Maxillary sinusitis (QNS96-MZ J32.0).     Orders     Orders   Pharmacy:  predniSONE 20 mg oral tablet (Prescribe): = 2 tab(s) ( 40 mg ), PO, Daily, x 5 day(s), # 10 tab(s), 0 Refill(s), Type: Maintenance, Pharmacy: [facility] , 2 tab(s) Oral daily,x5 day(s)  azithromycin 250 mg oral tablet (Prescribe): = 1 packet(s), PO, Once, Instructions: as directed on package labeling, # 6 tab(s), 0 Refill(s), Type: Soft Stop, Pharmacy: [facility] , 1 packet(s) Oral once,Instr:as directed on package labeling.     Push fluids, tylenol for pain/fever, f/u prn.

## 2022-02-16 NOTE — NURSING NOTE
Depression Screening Entered On:  3/29/2021 12:06 PM CDT    Performed On:  3/29/2021 12:06 PM CDT by Maryan Lara CMA               Depression Screening   Little Interest - Pleasure in Activities :   Not at all   Feeling Down, Depressed, Hopeless :   Not at all   Initial Depression Screen Score :   0 Score   Poor Appetite or Overeating :   Not at all   Trouble Falling or Staying Asleep :   Not at all   Feeling Tired or Little Energy :   Not at all   Feeling Bad About Yourself :   Not at all   Trouble Concentrating :   Not at all   Moving or Speaking Slowly :   Not at all   Thoughts Better Off Dead or Hurting Self :   Not at all   Difficulty at Work, Home, Getting Along :   Not difficult at all   Detailed Depression Screen Score :   0    Total Depression Screen Score :   0    Maryan Lara CMA - 3/29/2021 12:06 PM CDT

## 2022-02-16 NOTE — TELEPHONE ENCOUNTER
---------------------  From: Eda Enrique CMA   Sent: 3/1/2021 2:52:38 PM CST  Subject: strep results     Pt notified via phone call of negative strep results. Culture and COVID pending.

## 2022-03-23 DIAGNOSIS — G43.709 CHRONIC MIGRAINE WITHOUT AURA WITHOUT STATUS MIGRAINOSUS, NOT INTRACTABLE: Primary | ICD-10-CM

## 2022-03-23 NOTE — TELEPHONE ENCOUNTER
Last Written Prescription Date:  12/15/21   Last Fill Quantity: 10,  # refills: 2   Last office visit: 12/15/21   Future Office Visit:  none

## 2022-03-24 PROBLEM — D64.9 ANEMIA: Status: ACTIVE | Noted: 2022-03-24

## 2022-03-24 PROBLEM — G43.909 MIGRAINE HEADACHE: Status: ACTIVE | Noted: 2022-03-24

## 2022-03-24 PROBLEM — E66.9 OBESITY: Status: ACTIVE | Noted: 2022-03-24

## 2022-03-24 PROBLEM — E78.5 HYPERLIPIDEMIA: Status: ACTIVE | Noted: 2022-03-24

## 2022-03-24 PROBLEM — J30.2 SEASONAL ALLERGIES: Status: ACTIVE | Noted: 2022-03-24

## 2022-03-24 RX ORDER — KETOROLAC TROMETHAMINE 10 MG/1
TABLET, FILM COATED ORAL
Qty: 10 TABLET | Refills: 2 | Status: SHIPPED | OUTPATIENT
Start: 2022-03-24 | End: 2022-08-05

## 2022-04-04 ENCOUNTER — OFFICE VISIT (OUTPATIENT)
Dept: FAMILY MEDICINE | Facility: CLINIC | Age: 57
End: 2022-04-04
Payer: COMMERCIAL

## 2022-04-04 VITALS
SYSTOLIC BLOOD PRESSURE: 118 MMHG | TEMPERATURE: 97.6 F | DIASTOLIC BLOOD PRESSURE: 64 MMHG | OXYGEN SATURATION: 97 % | HEART RATE: 66 BPM

## 2022-04-04 DIAGNOSIS — G43.009 MIGRAINE WITHOUT AURA AND WITHOUT STATUS MIGRAINOSUS, NOT INTRACTABLE: Primary | ICD-10-CM

## 2022-04-04 PROCEDURE — 96372 THER/PROPH/DIAG INJ SC/IM: CPT | Performed by: PHYSICIAN ASSISTANT

## 2022-04-04 PROCEDURE — 99213 OFFICE O/P EST LOW 20 MIN: CPT | Mod: 25 | Performed by: PHYSICIAN ASSISTANT

## 2022-04-04 RX ORDER — KETOROLAC TROMETHAMINE 30 MG/ML
60 INJECTION, SOLUTION INTRAMUSCULAR; INTRAVENOUS ONCE
Status: COMPLETED | OUTPATIENT
Start: 2022-04-04 | End: 2022-04-04

## 2022-04-04 RX ADMIN — KETOROLAC TROMETHAMINE 60 MG: 30 INJECTION, SOLUTION INTRAMUSCULAR; INTRAVENOUS at 15:38

## 2022-04-04 ASSESSMENT — ENCOUNTER SYMPTOMS
TROUBLE SWALLOWING: 0
WEAKNESS: 0
TREMORS: 0
FACIAL SWELLING: 0
CONSTITUTIONAL NEGATIVE: 1
LIGHT-HEADEDNESS: 1
DIZZINESS: 1
PARESTHESIAS: 0
HEADACHES: 1
SPEECH DIFFICULTY: 0
NUMBNESS: 0
EYES NEGATIVE: 1

## 2022-04-04 NOTE — PROGRESS NOTES
"  Assessment & Plan     Migraine without aura and without status migrainosus, not intractable  Rare need for IM Toradol  - ketorolac (TORADOL) injection 60 mg    Partial relief. Home, rest in a quiet, dark space.           BMI:   Estimated body mass index is 26.17 kg/m  as calculated from the following:    Height as of 12/20/21: 1.607 m (5' 3.25\").    Weight as of 12/15/21: 67.5 kg (148 lb 14.4 oz).           No follow-ups on file.    KHRIS Nix  [facility]    Jennifer Grace is a 56 year old who presents for the following health issues     56 here for headache  Has a history of migraine cephalgia  She had some dental work about 10 days ago she has had minimal dental pain but she has had some neck pain and pain up into the left temple and the ear itself feels tender not inside the ear no fever has been noted no stiff neck  She typically does not get an aura but if she moves quickly she feels lightheaded no nasal congestion no problems swallowing    Musculoskeletal Problem  Associated symptoms include headaches. Pertinent negatives include no numbness or weakness.   History of Present Illness       Migraines:   Since the patient's last clinic visit, headaches are: worsened  The patient is getting headaches:  3 timesp  She is not able to do normal daily activities when she has a migraine.  The patient is taking the following rescue/relief medications:  Ibuprofen (Advil, Motrin) and other   Patient states \"I get some relief\" from the rescue/relief medications.   The patient is taking the following medications to prevent migraines:  No medications to prevent migraines  In the past 4 weeks, the patient has gone to an Urgent Care or Emergency Room 0 times times due to headaches.    She eats 2-3 servings of fruits and vegetables daily.She consumes 0 sweetened beverage(s) daily.She exercises with enough effort to increase her heart rate 30 to 60 minutes per day.  She exercises " with enough effort to increase her heart rate 3 or less days per week.   She is taking medications regularly.             Review of Systems   Constitutional: Negative.    HENT: Positive for ear pain. Negative for facial swelling and trouble swallowing.    Eyes: Negative.    Neurological: Positive for dizziness, light-headedness and headaches. Negative for tremors, speech difficulty, weakness, numbness and paresthesias.            Objective    /64   Pulse 66   Temp 97.6  F (36.4  C)   SpO2 97%   There is no height or weight on file to calculate BMI.  Physical Exam  HENT:      Head: Normocephalic and atraumatic.      Left Ear: Tympanic membrane normal.      Nose: Nose normal.      Mouth/Throat:      Mouth: Mucous membranes are moist.   Eyes:      Extraocular Movements: Extraocular movements intact.      Conjunctiva/sclera: Conjunctivae normal.      Pupils: Pupils are equal, round, and reactive to light.   Cardiovascular:      Rate and Rhythm: Normal rate.      Pulses: Normal pulses.      Heart sounds: Normal heart sounds.   Musculoskeletal:         General: Normal range of motion.      Cervical back: Normal range of motion and neck supple.   Lymphadenopathy:      Cervical: No cervical adenopathy.   Neurological:      General: No focal deficit present.      Mental Status: She is alert and oriented to person, place, and time.      Motor: No weakness.      Gait: Gait normal.

## 2022-08-04 DIAGNOSIS — G43.709 CHRONIC MIGRAINE WITHOUT AURA WITHOUT STATUS MIGRAINOSUS, NOT INTRACTABLE: ICD-10-CM

## 2022-08-05 RX ORDER — KETOROLAC TROMETHAMINE 10 MG/1
TABLET, FILM COATED ORAL
Qty: 10 TABLET | Refills: 2 | Status: SHIPPED | OUTPATIENT
Start: 2022-08-05 | End: 2022-11-07

## 2022-08-05 NOTE — TELEPHONE ENCOUNTER
Routing refill request to provider for review/approval because:  Drug not on the [facility] refill protocol     Last Written Prescription Date:  3/24/22  Last Fill Quantity: 10,  # refills: 2   Last office visit:4/4/22  Future Office Visit:

## 2022-10-25 ENCOUNTER — OFFICE VISIT (OUTPATIENT)
Dept: FAMILY MEDICINE | Facility: CLINIC | Age: 57
End: 2022-10-25
Payer: COMMERCIAL

## 2022-10-25 VITALS
TEMPERATURE: 97.3 F | RESPIRATION RATE: 16 BRPM | SYSTOLIC BLOOD PRESSURE: 114 MMHG | WEIGHT: 150 LBS | DIASTOLIC BLOOD PRESSURE: 68 MMHG | HEART RATE: 75 BPM | BODY MASS INDEX: 26.36 KG/M2 | OXYGEN SATURATION: 98 %

## 2022-10-25 DIAGNOSIS — J32.1 CHRONIC FRONTAL SINUSITIS: ICD-10-CM

## 2022-10-25 DIAGNOSIS — K21.00 GASTROESOPHAGEAL REFLUX DISEASE WITH ESOPHAGITIS WITHOUT HEMORRHAGE: ICD-10-CM

## 2022-10-25 DIAGNOSIS — H61.21 IMPACTED CERUMEN OF RIGHT EAR: Primary | ICD-10-CM

## 2022-10-25 PROCEDURE — 99214 OFFICE O/P EST MOD 30 MIN: CPT | Performed by: FAMILY MEDICINE

## 2022-10-25 RX ORDER — PANTOPRAZOLE SODIUM 40 MG/1
40 TABLET, DELAYED RELEASE ORAL DAILY
COMMUNITY
Start: 2022-09-10 | End: 2022-12-06

## 2022-10-25 RX ORDER — SUMATRIPTAN 100 MG/1
TABLET, FILM COATED ORAL
COMMUNITY
Start: 2021-12-15

## 2022-10-25 RX ORDER — ONDANSETRON 4 MG/1
TABLET, ORALLY DISINTEGRATING ORAL
COMMUNITY
Start: 2022-09-12 | End: 2022-12-28

## 2022-10-25 RX ORDER — CEFDINIR 300 MG/1
300 CAPSULE ORAL 2 TIMES DAILY
Qty: 20 CAPSULE | Refills: 0 | Status: SHIPPED | OUTPATIENT
Start: 2022-10-25 | End: 2023-03-15

## 2022-10-25 ASSESSMENT — ENCOUNTER SYMPTOMS
DIZZINESS: 1
SORE THROAT: 1
HEADACHES: 1

## 2022-10-25 NOTE — PROGRESS NOTES
"  Assessment & Plan     Impacted cerumen of right ear  Impaction removed     Chronic frontal sinusitis  Omnicef 300 BID    Gastroesophageal reflux disease with esophagitis without hemorrhage  Will refer to GI for work up.               BMI:   Estimated body mass index is 26.36 kg/m  as calculated from the following:    Height as of 12/20/21: 1.607 m (5' 3.25\").    Weight as of this encounter: 68 kg (150 lb).           Return if symptoms worsen or fail to improve.    Orlando Daugherty MD  [facility]    Jennifer Grace is a 57 year old, presenting for the following health issues:  Otalgia (Left ear pain and feeling plugged x2 months.) and Pharyngitis (X2 weeks. Increased acid reflux)      Pharyngitis   Associated symptoms include ear pain and headaches.   History of Present Illness       Headaches:   Since the patient's last clinic visit, headaches are: no change  The patient is getting headaches:  Weekly  She is not able to do normal daily activities when she has a migraine.  The patient is taking the following rescue/relief medications:  Ibuprofen (Advil, Motrin) and other   Patient states \"The relief is inconsistent\" from the rescue/relief medications.   The patient is taking the following medications to prevent migraines:  No medications to prevent migraines  In the past 4 weeks, the patient has gone to an Urgent Care or Emergency Room 0 times times due to headaches.    Reason for visit:  Ear ache  acid reflux  headache  Symptom onset:  More than a month  Symptoms include:  Pain  Symptom intensity:  Moderate  Symptom progression:  Staying the same  Had these symptoms before:  Yes  Has tried/received treatment for these symptoms:  Yes  Previous treatment was successful:  Yes  Prior treatment description:  Antbotics  What makes it worse:  No  What makes it better:  Warm compress on ear    She eats 2-3 servings of fruits and vegetables daily.She consumes 0 sweetened beverage(s) " daily.She exercises with enough effort to increase her heart rate 30 to 60 minutes per day.  She exercises with enough effort to increase her heart rate 3 or less days per week.   She is taking medications regularly.       GERD  Getting worse, interested in options other than medication.      Review of Systems   HENT: Positive for ear pain and sore throat.    Neurological: Positive for dizziness and headaches.      Constitutional, HEENT, cardiovascular, pulmonary, gi and gu systems are negative, except as otherwise noted.      Objective    /68 (BP Location: Right arm, Patient Position: Sitting)   Pulse 75   Temp 97.3  F (36.3  C) (Tympanic)   Resp 16   Wt 68 kg (150 lb)   SpO2 98%   BMI 26.36 kg/m    Body mass index is 26.36 kg/m .  Physical Exam   GENERAL: healthy, alert and no distress  HENT: normal cephalic/atraumatic, right ear: Canal filled with cerumin, nose and mouth without ulcers or lesions, oropharynx clear and oral mucous membranes moist  NECK: no adenopathy, no asymmetry, masses, or scars and thyroid normal to palpation  RESP: lungs clear to auscultation - no rales, rhonchi or wheezes  CV: regular rate and rhythm, normal S1 S2, no S3 or S4, no murmur, click or rub, no peripheral edema and peripheral pulses strong  ABDOMEN: soft, nontender, no hepatosplenomegaly, no masses and bowel sounds normal  MS: no gross musculoskeletal defects noted, no edema      On exam right EAC blocked with impacted cerumen.  Cerumen removed from right EAC with warm water irrigation and with lighted ear spoon without incident.  Procedure tolerated well.

## 2022-11-04 DIAGNOSIS — G43.709 CHRONIC MIGRAINE WITHOUT AURA WITHOUT STATUS MIGRAINOSUS, NOT INTRACTABLE: ICD-10-CM

## 2022-11-07 RX ORDER — KETOROLAC TROMETHAMINE 10 MG/1
TABLET, FILM COATED ORAL
Qty: 10 TABLET | Refills: 2 | Status: SHIPPED | OUTPATIENT
Start: 2022-11-07 | End: 2023-04-03

## 2022-11-07 NOTE — TELEPHONE ENCOUNTER
Routing to provider as refill request for review/approval because:  Drug not on the [facility] refill protocol   Sravani SALINAS RN  [facility]

## 2022-11-28 DIAGNOSIS — J32.1 CHRONIC FRONTAL SINUSITIS: ICD-10-CM

## 2022-11-28 RX ORDER — CEFDINIR 300 MG/1
300 CAPSULE ORAL 2 TIMES DAILY
Qty: 20 CAPSULE | Refills: 0 | OUTPATIENT
Start: 2022-11-28

## 2022-11-28 NOTE — TELEPHONE ENCOUNTER
Routing refill request to provider for review/approval because:  Drug not on the [facility] refill protocol     Last Written Prescription Date:  10/25/22  Last Fill Quantity: 20,  # refills: 0   Last office visit provider:  10/25/22     Requested Prescriptions   Pending Prescriptions Disp Refills     cefdinir (OMNICEF) 300 MG capsule 20 capsule 0     Sig: Take 1 capsule (300 mg) by mouth 2 times daily       There is no refill protocol information for this order

## 2022-11-28 NOTE — TELEPHONE ENCOUNTER
Medication Question or Refill    Contacts       Type Contact Phone/Fax    11/28/2022 01:19 PM CST Phone (Incoming) Lesly Mcclellan (Self) [phone] (H)          What medication are you calling about (include dose and sig)?: cefdinir 300 mg capsule    Controlled Substance Agreement on file:   CSA -- Patient Level:    CSA: None found at the patient level.       Who prescribed the medication?: Dr. Daugherty    Do you need a refill? Yes: no refills      Preferred Pharmacy:   [address] [address]  Phone: [phone] Fax: [phone]      Okay to leave a detailed message?: Yes at Home number on file [phone] (home)

## 2022-12-03 DIAGNOSIS — K21.00 GASTROESOPHAGEAL REFLUX DISEASE WITH ESOPHAGITIS, UNSPECIFIED WHETHER HEMORRHAGE: Primary | ICD-10-CM

## 2022-12-05 ENCOUNTER — TRANSFERRED RECORDS (OUTPATIENT)
Dept: HEALTH INFORMATION MANAGEMENT | Facility: CLINIC | Age: 57
End: 2022-12-05

## 2022-12-06 RX ORDER — PANTOPRAZOLE SODIUM 40 MG/1
TABLET, DELAYED RELEASE ORAL
Qty: 90 TABLET | Refills: 0 | Status: SHIPPED | OUTPATIENT
Start: 2022-12-06 | End: 2023-03-15

## 2023-02-12 ENCOUNTER — HEALTH MAINTENANCE LETTER (OUTPATIENT)
Age: 58
End: 2023-02-12

## 2023-02-20 ENCOUNTER — TRANSFERRED RECORDS (OUTPATIENT)
Dept: HEALTH INFORMATION MANAGEMENT | Facility: CLINIC | Age: 58
End: 2023-02-20
Payer: COMMERCIAL

## 2023-03-15 ENCOUNTER — E-VISIT (OUTPATIENT)
Dept: FAMILY MEDICINE | Facility: CLINIC | Age: 58
End: 2023-03-15
Payer: COMMERCIAL

## 2023-03-15 DIAGNOSIS — B35.9 TINEA: Primary | ICD-10-CM

## 2023-03-15 PROCEDURE — 99421 OL DIG E/M SVC 5-10 MIN: CPT | Performed by: FAMILY MEDICINE

## 2023-03-15 RX ORDER — KETOCONAZOLE 20 MG/G
CREAM TOPICAL DAILY
Qty: 30 G | Refills: 0 | Status: SHIPPED | OUTPATIENT
Start: 2023-03-15 | End: 2023-05-15

## 2023-03-15 NOTE — PATIENT INSTRUCTIONS
Dear Lesly Mcclellan    After reviewing your responses, I think this may be tinea (ringworm)  Based on your responses, I have prescribed ketoconazole to treat this. Please follow the instructions on the medication. If you experience irritation of your skin, new rash, or any other new symptoms, you should stop using this medication and contact your primary care provider.  If this treatment does not work for you in 2 weeks or after completing treatment, please plan to follow-up with your primary care provider to evaluate in-person.  Self-care:  Wash all items that come into contact with infected skin: Wash all towels, clothes, and bedding in hot water. Use laundry soap. Clean shower stalls, mats, and floors with a germ-killing or fungus-killing .   Do not share personal items: Do not share towels, brushes, solano, or hair accessories.    Keep your skin, hair, and nails clean and dry: Bathe every day, and dry your skin before you put medicine on the infected area. Wash your hands often. Do not scratch your sores. This may cause the infection to spread.   Avoid infected pets: A patch of missing fur is a sign of infection in a pet. Take your infected pet to a  for treatment.  Contact your primary healthcare provider if:  You have a fever.    Your infection continues to spread after 7 days of treatment.   Your rash is not gone in 2 weeks.   The area around your rash becomes red, warm, tender, swollen, or smells bad.   You have questions or concerns about your condition or care.    Thanks for choosing?us?as your health care partner,    Edith Yu MD

## 2023-03-18 ENCOUNTER — OFFICE VISIT (OUTPATIENT)
Dept: URGENT CARE | Facility: URGENT CARE | Age: 58
End: 2023-03-18
Payer: COMMERCIAL

## 2023-03-18 VITALS
WEIGHT: 152 LBS | TEMPERATURE: 97.5 F | DIASTOLIC BLOOD PRESSURE: 69 MMHG | RESPIRATION RATE: 16 BRPM | BODY MASS INDEX: 26.71 KG/M2 | HEART RATE: 84 BPM | OXYGEN SATURATION: 98 % | SYSTOLIC BLOOD PRESSURE: 112 MMHG

## 2023-03-18 DIAGNOSIS — L71.9 ROSACEA: Primary | ICD-10-CM

## 2023-03-18 PROCEDURE — 99213 OFFICE O/P EST LOW 20 MIN: CPT

## 2023-03-18 RX ORDER — METRONIDAZOLE 7.5 MG/G
GEL TOPICAL 2 TIMES DAILY
Qty: 45 G | Refills: 0 | Status: SHIPPED | OUTPATIENT
Start: 2023-03-18 | End: 2023-08-04

## 2023-03-18 NOTE — PROGRESS NOTES
Assessment & Plan     Rosacea  Apply gel once daily.  Avoid harsh soaps lotions or detergents.  - metroNIDAZOLE (METROGEL) 0.75 % external gel; Apply topically 2 times daily                 No follow-ups on file.    [facility]RGENT CARE [place]    Jennifer Grace is a 57 year old, presenting for the following health issues:  Rash (Rash around mouth and cheeks for the past 3 weeks. Applied Nizoral cream makes it burn worse when applying cream)      HPI     Lesly comes in today with a malar reddened rash appearing on her cheeks.  At times gets irritated and itchy.  Feels very dry at times.  Was seen via telemedicine recently and treated for possible ringworm.  Denies any exposures.      Review of Systems   Constitutional, HEENT, cardiovascular, pulmonary, gi and gu systems are negative, except as otherwise noted.      Objective    /69   Pulse 84   Temp 97.5  F (36.4  C)   Resp 16   Wt 68.9 kg (152 lb)   SpO2 98%   BMI 26.71 kg/m    Body mass index is 26.71 kg/m .  Physical Exam  Constitutional:       Appearance: Normal appearance.   HENT:      Head: Normocephalic and atraumatic.        Right Ear: Tympanic membrane normal.      Left Ear: Tympanic membrane normal.      Nose: Nose normal.   Eyes:      Extraocular Movements: Extraocular movements intact.      Pupils: Pupils are equal, round, and reactive to light.   Neurological:      Mental Status: She is alert.

## 2023-03-31 DIAGNOSIS — G43.709 CHRONIC MIGRAINE WITHOUT AURA WITHOUT STATUS MIGRAINOSUS, NOT INTRACTABLE: ICD-10-CM

## 2023-03-31 NOTE — TELEPHONE ENCOUNTER
Last office visit: Visit date not found   3/18/23- UC  10/25/22-acute  4/4/22-migraine    Future Appointments 3/31/2023 - 9/27/2023    None          Requested Prescriptions   Pending Prescriptions Disp Refills     ketorolac (TORADOL) 10 MG tablet [Pharmacy Med Name: ketorolac 10 mg tablet] 10 tablet 3     Sig: TAKE ONE TABLET BY MOUTH THREE TIMES DAILY AS NEEDED FOR PAIN FOR FIVE DAYS ONLY       There is no refill protocol information for this order

## 2023-04-02 ENCOUNTER — OFFICE VISIT (OUTPATIENT)
Dept: URGENT CARE | Facility: URGENT CARE | Age: 58
End: 2023-04-02
Payer: COMMERCIAL

## 2023-04-02 VITALS
OXYGEN SATURATION: 95 % | WEIGHT: 152 LBS | SYSTOLIC BLOOD PRESSURE: 118 MMHG | HEART RATE: 84 BPM | DIASTOLIC BLOOD PRESSURE: 62 MMHG | BODY MASS INDEX: 26.71 KG/M2 | RESPIRATION RATE: 18 BRPM | TEMPERATURE: 97.9 F

## 2023-04-02 DIAGNOSIS — J02.9 SORE THROAT: ICD-10-CM

## 2023-04-02 DIAGNOSIS — R68.83 CHILLS: ICD-10-CM

## 2023-04-02 DIAGNOSIS — J01.00 ACUTE NON-RECURRENT MAXILLARY SINUSITIS: Primary | ICD-10-CM

## 2023-04-02 DIAGNOSIS — R51.9 FACIAL PAIN: ICD-10-CM

## 2023-04-02 LAB
DEPRECATED S PYO AG THROAT QL EIA: NEGATIVE
FLUAV AG SPEC QL IA: NEGATIVE
FLUBV AG SPEC QL IA: NEGATIVE
GROUP A STREP BY PCR: NOT DETECTED

## 2023-04-02 PROCEDURE — 87804 INFLUENZA ASSAY W/OPTIC: CPT | Performed by: FAMILY MEDICINE

## 2023-04-02 PROCEDURE — 87651 STREP A DNA AMP PROBE: CPT | Performed by: FAMILY MEDICINE

## 2023-04-02 PROCEDURE — U0005 INFEC AGEN DETEC AMPLI PROBE: HCPCS | Performed by: FAMILY MEDICINE

## 2023-04-02 PROCEDURE — U0003 INFECTIOUS AGENT DETECTION BY NUCLEIC ACID (DNA OR RNA); SEVERE ACUTE RESPIRATORY SYNDROME CORONAVIRUS 2 (SARS-COV-2) (CORONAVIRUS DISEASE [COVID-19]), AMPLIFIED PROBE TECHNIQUE, MAKING USE OF HIGH THROUGHPUT TECHNOLOGIES AS DESCRIBED BY CMS-2020-01-R: HCPCS | Performed by: FAMILY MEDICINE

## 2023-04-02 PROCEDURE — 99213 OFFICE O/P EST LOW 20 MIN: CPT | Mod: CS | Performed by: FAMILY MEDICINE

## 2023-04-02 NOTE — PROGRESS NOTES
Clinical Decision Making:    At the end of the encounter, I discussed results, diagnosis, medications. Discussed red flags for immediate return to clinic/ER, as well as indications for follow up if no improvement. Patient understood and agreed to plan. Patient was stable for discharge.      ICD-10-CM    1. Acute non-recurrent maxillary sinusitis  J01.00 amoxicillin-clavulanate (AUGMENTIN) 875-125 MG tablet      2. Facial pain  R51.9 Streptococcus A Rapid Screen w/Reflex to PCR - Clinic Collect     Symptomatic COVID-19 Virus (Coronavirus) by PCR Nose     Influenza A & B Antigen - Clinic Collect     Group A Streptococcus PCR Throat Swab      3. Sore throat  J02.9 Streptococcus A Rapid Screen w/Reflex to PCR - Clinic Collect     Symptomatic COVID-19 Virus (Coronavirus) by PCR Nose     Influenza A & B Antigen - Clinic Collect     Group A Streptococcus PCR Throat Swab      4. Chills  R68.83 Streptococcus A Rapid Screen w/Reflex to PCR - Clinic Collect     Symptomatic COVID-19 Virus (Coronavirus) by PCR Nose     Influenza A & B Antigen - Clinic Collect     Group A Streptococcus PCR Throat Swab        Strep PCR and COVID PCR pending  Sent in augmentin to the pharmacy.  Patient will fill this if she is feeling worse or not getting better after 10-14 days of illness.  Follow up with primary for med refills for migraines  Acetaminophen (Tylenol) 500mg tablets.  You may take 2 tabs every 4-6 hours as needed  Ibuprofen (Advil, Motrin) 200mg tablets.  You may take 3 tabs every 6-8 hours as needed with food.  Printed patient education for sinusitis      There are no Patient Instructions on file for this visit.   Return in about 2 weeks (around 4/16/2023), or if symptoms worsen or fail to improve.      chief complaint    HPI:  Lesly Mcclellan is a 57 year old female who presents today complaining of cold and sinus symptoms for 4 to 7 days.  She has a bad sore throat.  Her ears feel plugged and she has a lot of facial pain.   Positive history of sinus infections.  Her neck feels sore but no other myalgias.  She has some swollen glands in her neck.  Positive nasal congestion as well as rhinitis.  Positive cough but no shortness of breath.  Positive chills and she does feel hot at times but she has not had a fever at home.  Positive fatigue and mild dizziness.  She has been using green tea with honey, yesterday she took a Zyrtec and she is also been using salt water gargles.    Recently both her 8-month-old granddaughter and her daughter have been ill.    History obtained from chart review and the patient.    Problem List:  2022-03: Anemia  2022-03: Hyperlipidemia  2022-03: Migraine headache  2022-03: Obesity  2022-03: Seasonal allergies      History reviewed. No pertinent past medical history.    Social History     Tobacco Use     Smoking status: Former     Types: Cigarettes     Smokeless tobacco: Never   Vaping Use     Vaping status: Never Used   Substance Use Topics     Alcohol use: Not Currently       Review of systems  negative except listed in HPI    Vitals:    04/02/23 1002   BP: 118/62   BP Location: Right arm   Pulse: 84   Resp: 18   Temp: 97.9  F (36.6  C)   TempSrc: Tympanic   SpO2: 95%   Weight: 68.9 kg (152 lb)       Physical Exam  Vitals noted and within normal limits.  Patient is alert, oriented, and in no acute distress.  Eyes: Conjunctive not injected.  Ears: Canals patent, TMs intact, no erythema and no bulging.  Mouth: Mucous membranes pink and moist.  Pharynx is not erythematous.  Neck supple with positive superior, anterior cervical lymphadenopathy bilaterally.  Heart has a regular rate and rhythm with no murmurs.  Lungs are clear to auscultation bilaterally with good air entry.  No wheezes, rales, rhonchi.  Strep: Negative  Influenza: Negative  Results for orders placed or performed in visit on 04/02/23   Streptococcus A Rapid Screen w/Reflex to PCR - Clinic Collect     Status: Normal    Specimen: Throat; Swab    Result Value Ref Range    Group A Strep antigen Negative Negative   Influenza A & B Antigen - Clinic Collect     Status: Normal    Specimen: Nose; Swab   Result Value Ref Range    Influenza A antigen Negative Negative    Influenza B antigen Negative Negative    Narrative    Test results must be correlated with clinical data. If necessary, results should be confirmed by a molecular assay or viral culture.

## 2023-04-03 LAB — SARS-COV-2 RNA RESP QL NAA+PROBE: NEGATIVE

## 2023-04-03 RX ORDER — KETOROLAC TROMETHAMINE 10 MG/1
TABLET, FILM COATED ORAL
Qty: 10 TABLET | Refills: 3 | Status: SHIPPED | OUTPATIENT
Start: 2023-04-03 | End: 2023-05-15

## 2023-04-22 ENCOUNTER — OFFICE VISIT (OUTPATIENT)
Dept: URGENT CARE | Facility: URGENT CARE | Age: 58
End: 2023-04-22
Payer: COMMERCIAL

## 2023-04-22 VITALS
RESPIRATION RATE: 15 BRPM | OXYGEN SATURATION: 99 % | TEMPERATURE: 97.7 F | DIASTOLIC BLOOD PRESSURE: 71 MMHG | HEART RATE: 66 BPM | SYSTOLIC BLOOD PRESSURE: 124 MMHG

## 2023-04-22 DIAGNOSIS — G43.811 OTHER MIGRAINE WITH STATUS MIGRAINOSUS, INTRACTABLE: Primary | ICD-10-CM

## 2023-04-22 PROCEDURE — 99207 PR NO CHARGE LOS: CPT

## 2023-04-22 ASSESSMENT — PAIN SCALES - GENERAL: PAINLEVEL: EXTREME PAIN (8)

## 2023-04-22 NOTE — PROGRESS NOTES
Assessment & Plan     Other migraine with status migrainosus, intractable  Rapidly assessed pt and based on worst headache of life and different character should be seen immediately in the ED.    I discussed with EDMD.  Pt to ED Via private vehicle.      [facility]  [facility] URGENT CARE [place]    Jennifer Grace is a 57 year old female who presents to clinic today for the following health issues:  Chief Complaint   Patient presents with     Headache     Migraine - since Monday      HPI    Pt accompanied by her .    She has a hx of migraine, gets monthly typically.    This HA is different. Started Monday, not thunderclap. No improvement with oral Toradol.  Character of HA is different in that it involves the entire head.    Normally HA or unilateral behind eye on R.   This is the worst HA she has ever had.        Review of Systems  Constitutional, HEENT, cardiovascular, pulmonary, gi and gu systems are negative, except as otherwise noted.      Objective    /71 (BP Location: Right arm, Patient Position: Sitting, Cuff Size: Adult Regular)   Pulse 66   Temp 97.7  F (36.5  C) (Oral)   Resp 15   SpO2 99%   Physical Exam   Pt is in distress holding head, rocking back and forth.    Answers questions appropriately.  A and O x 3.

## 2023-05-15 ENCOUNTER — OFFICE VISIT (OUTPATIENT)
Dept: FAMILY MEDICINE | Facility: CLINIC | Age: 58
End: 2023-05-15
Payer: COMMERCIAL

## 2023-05-15 VITALS
DIASTOLIC BLOOD PRESSURE: 62 MMHG | OXYGEN SATURATION: 98 % | TEMPERATURE: 97.7 F | HEART RATE: 69 BPM | RESPIRATION RATE: 14 BRPM | WEIGHT: 152.3 LBS | SYSTOLIC BLOOD PRESSURE: 98 MMHG | BODY MASS INDEX: 26.98 KG/M2 | HEIGHT: 63 IN

## 2023-05-15 DIAGNOSIS — J02.9 SORE THROAT: ICD-10-CM

## 2023-05-15 DIAGNOSIS — Z12.11 SCREEN FOR COLON CANCER: ICD-10-CM

## 2023-05-15 DIAGNOSIS — G43.709 CHRONIC MIGRAINE WITHOUT AURA WITHOUT STATUS MIGRAINOSUS, NOT INTRACTABLE: ICD-10-CM

## 2023-05-15 DIAGNOSIS — Z12.31 VISIT FOR SCREENING MAMMOGRAM: ICD-10-CM

## 2023-05-15 DIAGNOSIS — A69.20 LYME DISEASE: ICD-10-CM

## 2023-05-15 DIAGNOSIS — J01.10 ACUTE NON-RECURRENT FRONTAL SINUSITIS: Primary | ICD-10-CM

## 2023-05-15 LAB
DEPRECATED S PYO AG THROAT QL EIA: NEGATIVE
GROUP A STREP BY PCR: NOT DETECTED

## 2023-05-15 PROCEDURE — 87651 STREP A DNA AMP PROBE: CPT

## 2023-05-15 PROCEDURE — 99213 OFFICE O/P EST LOW 20 MIN: CPT

## 2023-05-15 RX ORDER — KETOROLAC TROMETHAMINE 10 MG/1
TABLET, FILM COATED ORAL
Qty: 15 TABLET | Refills: 1 | Status: SHIPPED | OUTPATIENT
Start: 2023-05-15 | End: 2023-07-17

## 2023-05-15 RX ORDER — DOXYCYCLINE HYCLATE 100 MG
100 TABLET ORAL 2 TIMES DAILY
Qty: 20 TABLET | Refills: 0 | Status: SHIPPED | OUTPATIENT
Start: 2023-05-15 | End: 2023-05-25

## 2023-05-15 ASSESSMENT — ENCOUNTER SYMPTOMS
LIGHT-HEADEDNESS: 0
EYE DISCHARGE: 0
COUGH: 0
FACIAL ASYMMETRY: 0
SINUS PRESSURE: 1
SORE THROAT: 1
DYSURIA: 0
FACIAL SWELLING: 1
SHORTNESS OF BREATH: 0
TROUBLE SWALLOWING: 0
ARTHRALGIAS: 0
DIZZINESS: 0

## 2023-05-15 NOTE — PROGRESS NOTES
"  Assessment & Plan     Acute non-recurrent frontal sinusitis  Frontal sinus tenderness, mild erythema and mild swelling on exam.  Patient does have a history of rosacea which has been effectively treated with metronidazole topically.  Patient still has this medication available.  - doxycycline hyclate (VIBRA-TABS) 100 MG tablet; Take 1 tablet (100 mg) by mouth 2 times daily for 10 days    Sore throat  Rapid strep negative today, patient informed she will be notified of any positive PCR results and plan of care adjusted as needed.  - Streptococcus A Rapid Screen w/Reflex to PCR - Clinic Collect  - Group A Streptococcus PCR Throat Swab    Lyme disease  Tick bite to right flank with 1 cm of erythema surrounding bite.  Tick removed 2 days ago, unknown how long tick was present.  Denies fevers, but has had chills with illness this week.  - doxycycline hyclate (VIBRA-TABS) 100 MG tablet; Take 1 tablet (100 mg) by mouth 2 times daily for 10 days    Chronic migraine without aura without status migrainosus, not intractable  Requests refill of Toradol, given refill for one 5-day treatment course with 1 refill as requested.  - ketorolac (TORADOL) 10 MG tablet; TAKE ONE TABLET BY MOUTH THREE TIMES DAILY AS NEEDED FOR PAIN FOR FIVE DAYS ONLY    Visit for screening mammogram  Amenable to mammogram for screening, last mammogram on record was April 2019.  Patient given number to call for scheduling should she not be contacted  - MA SCREENING DIGITAL BILAT - Future  (s+30); Future    Screen for colon cancer  Patient last colonoscopy in 2013.  Did have small polyp at that time but was recommended for 10-year follow-up.  Patient amenable to repeating, denies symptoms no blood in stools, no familial or personal history of colon cancer.  - Colonoscopy Screening  Referral; Future             BMI:   Estimated body mass index is 26.77 kg/m  as calculated from the following:    Height as of this encounter: 1.607 m (5' 3.25\").    " Weight as of this encounter: 69.1 kg (152 lb 4.8 oz).           RICO Acosta CNP [facility]    Jennifer Grace is a 58 year old, presenting for the following health issues:  Pharyngitis (1 week - strep exposure)        5/15/2023     8:49 AM   Additional Questions   Roomed by Esme     Lesly is a 58-year-old female ambulatory to clinic with reports of sore throat and sinus pressure for 1 week. Has tried zyrtec and ibuprofen with no relief. Was exposed to child with strep 1 week ago. Cares for 9 month old. Thinks in the mirror cheeks may be a little swollen. Burning feeling in nose and feeling of fullness in face.     Was also bit by a tick that she removed on Saturday. Unsure if it was a deer tick but reports it was small. Small red bump at site, no surrounding rash.     Had a colonoscopy in 2013 and was recommended for every 10 year testing.  Denies any blood in stools or black or tarry stools.  No familial or personal history of colon cancer.    Had upper endoscopy for frequent hearburn. Stopped taking PPI and pepcid, and took cefdinir for a sinus infection and symptoms of acid reflux resolved.     Pharyngitis   Associated symptoms include congestion. Pertinent negatives include no shortness of breath, no trouble swallowing and no cough.   History of Present Illness       Reason for visit:  Sore throat  Symptom onset:  3-7 days ago    She eats 2-3 servings of fruits and vegetables daily.She consumes 0 sweetened beverage(s) daily.She exercises with enough effort to increase her heart rate 30 to 60 minutes per day.  She exercises with enough effort to increase her heart rate 7 days per week.   She is taking medications regularly.         Acute Illness  Acute illness concerns: sore throat  Onset/Duration: 1 week  Symptoms:  Fever: No  Chills/Sweats: YES  Headache (location?): YES  Sinus Pressure: YES  Conjunctivitis:  No  Ear Pain: YES: left  Rhinorrhea: No  Congestion:  "YES  Sore Throat: YES  Cough: no  Wheeze: No  Decreased Appetite: No  Nausea: No  Vomiting: No  Diarrhea: No  Dysuria/Freq.: No  Dysuria or Hematuria: No  Fatigue/Achiness: YES  Sick/Strep Exposure: YES  Therapies tried and outcome: Allergy Med, ibuprofen      Review of Systems   HENT: Positive for congestion, facial swelling, postnasal drip, sinus pressure and sore throat. Negative for trouble swallowing.    Eyes: Negative for discharge.   Respiratory: Negative for cough and shortness of breath.    Cardiovascular: Negative for peripheral edema.   Genitourinary: Negative for dysuria.   Musculoskeletal: Negative for arthralgias.   Neurological: Negative for dizziness, facial asymmetry and light-headedness.            Objective    BP 98/62 (BP Location: Right arm, Patient Position: Sitting, Cuff Size: Adult Large)   Pulse 69   Temp 97.7  F (36.5  C) (Oral)   Resp 14   Ht 1.607 m (5' 3.25\")   Wt 69.1 kg (152 lb 4.8 oz)   LMP  (LMP Unknown)   SpO2 98%   BMI 26.77 kg/m    Body mass index is 26.77 kg/m .  Physical Exam  Constitutional:       Appearance: Normal appearance.   HENT:      Head: Normocephalic.      Right Ear: Tympanic membrane normal.      Left Ear: Tympanic membrane normal.      Nose: Congestion present.      Mouth/Throat:      Mouth: Mucous membranes are moist.      Pharynx: Oropharynx is clear. Posterior oropharyngeal erythema present. No oropharyngeal exudate.   Eyes:      Extraocular Movements: Extraocular movements intact.      Conjunctiva/sclera: Conjunctivae normal.   Cardiovascular:      Rate and Rhythm: Normal rate and regular rhythm.      Heart sounds: Normal heart sounds.   Pulmonary:      Effort: Pulmonary effort is normal. No respiratory distress.      Breath sounds: Normal breath sounds. No wheezing.   Musculoskeletal:         General: Normal range of motion.      Right lower leg: No edema.      Left lower leg: No edema.   Skin:     General: Skin is warm and dry.      Findings: Erythema " present.      Comments: Right flank 1 cm surrounding site of tick removal 2 days ago.    Neurological:      Mental Status: She is alert and oriented to person, place, and time.   Psychiatric:         Behavior: Behavior normal.         Thought Content: Thought content normal.

## 2023-07-12 ENCOUNTER — TELEPHONE (OUTPATIENT)
Dept: FAMILY MEDICINE | Facility: CLINIC | Age: 58
End: 2023-07-12
Payer: COMMERCIAL

## 2023-07-12 DIAGNOSIS — Z13.1 SCREENING FOR DIABETES MELLITUS: Primary | ICD-10-CM

## 2023-07-12 DIAGNOSIS — Z13.6 SCREENING FOR CARDIOVASCULAR CONDITION: ICD-10-CM

## 2023-07-12 NOTE — TELEPHONE ENCOUNTER
General Call    Contacts       Type Contact Phone/Fax    07/12/2023 01:12 PM CDT Phone (Incoming) Lesly Mcclellan (Self) [phone] (M)        Reason for Call:  Lab orders    What are your questions or concerns:  Pt is wondering if PCP can enter lab orders so that she can come in and do lab work before she comes in for her annual physical on 08/10/2023.    Date of last appointment with provider: 03/15/2023 e visit with PCP.    Could we send this information to you in [place] or would you prefer to receive a phone call?:   Patient would prefer a phone call   Okay to leave a detailed message?: Yes at Cell number on file:    Telephone Information:   Mobile [phone]     Marbella Francois

## 2023-07-17 ENCOUNTER — LAB (OUTPATIENT)
Dept: LAB | Facility: CLINIC | Age: 58
End: 2023-07-17
Payer: COMMERCIAL

## 2023-07-17 DIAGNOSIS — Z13.1 SCREENING FOR DIABETES MELLITUS: ICD-10-CM

## 2023-07-17 DIAGNOSIS — G43.709 CHRONIC MIGRAINE WITHOUT AURA WITHOUT STATUS MIGRAINOSUS, NOT INTRACTABLE: ICD-10-CM

## 2023-07-17 DIAGNOSIS — Z13.6 SCREENING FOR CARDIOVASCULAR CONDITION: ICD-10-CM

## 2023-07-17 LAB
CHOLEST SERPL-MCNC: 273 MG/DL
FASTING STATUS PATIENT QL REPORTED: YES
GLUCOSE SERPL-MCNC: 100 MG/DL (ref 70–99)
HDLC SERPL-MCNC: 68 MG/DL
LDLC SERPL CALC-MCNC: 185 MG/DL
NONHDLC SERPL-MCNC: 205 MG/DL
TRIGL SERPL-MCNC: 98 MG/DL

## 2023-07-17 PROCEDURE — 80061 LIPID PANEL: CPT

## 2023-07-17 PROCEDURE — 36415 COLL VENOUS BLD VENIPUNCTURE: CPT

## 2023-07-17 PROCEDURE — 82947 ASSAY GLUCOSE BLOOD QUANT: CPT | Mod: QW

## 2023-07-17 RX ORDER — KETOROLAC TROMETHAMINE 10 MG/1
TABLET, FILM COATED ORAL
Qty: 10 TABLET | Refills: 0 | Status: SHIPPED | OUTPATIENT
Start: 2023-07-17 | End: 2023-08-10

## 2023-07-17 NOTE — TELEPHONE ENCOUNTER
Last office visit: 5/15/23 (Mireille)    Future Appointments 7/17/2023 - 1/13/2024      Date Visit Type Length Department Provider     8/10/2023  8:45 AM PREVENTATIVE ADULT 30 min RVFL FP/IM/PEDEdith Hernandez MD    Location Instructions:     [facility] one block north of [facility] and the [facility]. The clinic shares a building with the [facility] [email or web address]; use the clinic s parking lot and entrance on the building s south side.                    Requested Prescriptions   Pending Prescriptions Disp Refills     ketorolac (TORADOL) 10 MG tablet [Pharmacy Med Name: ketorolac 10 mg tablet] 10 tablet 3     Sig: TAKE ONE TABLET BY MOUTH THREE TIMES DAILY AS NEEDED FOR PAIN FOR FIVE DAYS ONLY       There is no refill protocol information for this order

## 2023-07-27 ENCOUNTER — TELEPHONE (OUTPATIENT)
Dept: FAMILY MEDICINE | Facility: CLINIC | Age: 58
End: 2023-07-27

## 2023-07-27 ENCOUNTER — OFFICE VISIT (OUTPATIENT)
Dept: FAMILY MEDICINE | Facility: CLINIC | Age: 58
End: 2023-07-27
Payer: COMMERCIAL

## 2023-07-27 VITALS
SYSTOLIC BLOOD PRESSURE: 107 MMHG | HEIGHT: 63 IN | BODY MASS INDEX: 27.25 KG/M2 | WEIGHT: 153.8 LBS | DIASTOLIC BLOOD PRESSURE: 68 MMHG | RESPIRATION RATE: 20 BRPM | OXYGEN SATURATION: 97 % | TEMPERATURE: 97.6 F | HEART RATE: 71 BPM

## 2023-07-27 DIAGNOSIS — S16.1XXA STRAIN OF NECK MUSCLE, INITIAL ENCOUNTER: ICD-10-CM

## 2023-07-27 DIAGNOSIS — H69.91 DYSFUNCTION OF RIGHT EUSTACHIAN TUBE: Primary | ICD-10-CM

## 2023-07-27 PROCEDURE — 99214 OFFICE O/P EST MOD 30 MIN: CPT | Performed by: FAMILY MEDICINE

## 2023-07-27 NOTE — PROGRESS NOTES
"  Assessment & Plan     Dysfunction of right eustachian tube  Advise decongestant and fluticasone nasal spray.  She was educated on the intermittent nature of the problem.  She will follow-up if symptoms are not improving.    Strain of neck muscle, initial encounter  NSAID as needed, alternating ice and heat as well.             BMI:   Estimated body mass index is 27.03 kg/m  as calculated from the following:    Height as of this encounter: 1.607 m (5' 3.25\").    Weight as of this encounter: 69.8 kg (153 lb 12.8 oz).           Jaskaran Tran MD  [facility]    Jennifer Grace is a 58 year old, presenting for the following health issues:  Ear Problem (C/o ear pain)        7/27/2023    11:39 AM   Additional Questions   Roomed by Maylin COREA CMA   Accompanied by Self       History of Present Illness       Reason for visit:  Ear pain fluid in left ear  Symptom onset:  1-2 weeks ago  Symptoms include:  Pain  Symptom intensity:  Severe  Symptom progression:  Staying the same  Had these symptoms before:  Yes  Has tried/received treatment for these symptoms:  Yes  What makes it worse:  No  What makes it better:  Heat    She eats 2-3 servings of fruits and vegetables daily.She consumes 0 sweetened beverage(s) daily.She exercises with enough effort to increase her heart rate 30 to 60 minutes per day.  She exercises with enough effort to increase her heart rate 3 or less days per week.   She is taking medications regularly.       Patient is here with left ear pain for the past 2 weeks.  She did have someone look in ear and felt that she had fluid in her ear.  She also stated the pain is going down her ear.  She is also complaining of having a scratchy throat.    She has left posterior cervical tenderness.  She few days ago and felt a twinge in the back mild spasm since that time.        Review of Systems   Constitutional, HEENT, cardiovascular, pulmonary, gi and gu systems are negative, except " "as otherwise noted.      Objective    /68 (BP Location: Right arm, Patient Position: Sitting, Cuff Size: Adult Regular)   Pulse 71   Temp 97.6  F (36.4  C) (Tympanic)   Resp 20   Ht 1.607 m (5' 3.25\")   Wt 69.8 kg (153 lb 12.8 oz)   SpO2 97%   BMI 27.03 kg/m    Body mass index is 27.03 kg/m .  Physical Exam   Alert, oriented, no acute distress  Nose patent, TMs are clear  Throat is clear  Neck is supple, tenderness in the right SCM complex  Lungs are clear  Heart is regular rate and rhythm                      "

## 2023-07-27 NOTE — TELEPHONE ENCOUNTER
Lmx1 on  to schedule appointment. Note: only 1 appointment left for Dr. Tran, so it may be taken by the time patient calls back.

## 2023-07-27 NOTE — TELEPHONE ENCOUNTER
Reason for Call:  Appointment Request    Patient requesting this type of appt:  Ear pain    Requested provider: Edith Yu    Reason patient unable to be scheduled: Not within requested timeframe    When does patient want to be seen/preferred time: Same day    Comments: anyone at [facility]    Could we send this information to you in [facility] or would you prefer to receive a phone call?:   Patient would prefer a phone call   Okay to leave a detailed message?: Yes at Cell number on file:    Telephone Information:   Mobile [phone]       Call taken on 7/27/2023 at 7:13 AM by Chari PEDRO

## 2023-08-04 ENCOUNTER — OFFICE VISIT (OUTPATIENT)
Dept: FAMILY MEDICINE | Facility: CLINIC | Age: 58
End: 2023-08-04
Payer: COMMERCIAL

## 2023-08-04 VITALS
HEIGHT: 63 IN | SYSTOLIC BLOOD PRESSURE: 104 MMHG | OXYGEN SATURATION: 98 % | RESPIRATION RATE: 18 BRPM | HEART RATE: 62 BPM | DIASTOLIC BLOOD PRESSURE: 76 MMHG | TEMPERATURE: 98.1 F | WEIGHT: 153 LBS | BODY MASS INDEX: 27.11 KG/M2

## 2023-08-04 DIAGNOSIS — T14.8XXA BRUISING: Primary | ICD-10-CM

## 2023-08-04 LAB
BASOPHILS # BLD AUTO: 0 10E3/UL (ref 0–0.2)
BASOPHILS NFR BLD AUTO: 1 %
EOSINOPHIL # BLD AUTO: 0.2 10E3/UL (ref 0–0.7)
EOSINOPHIL NFR BLD AUTO: 3 %
ERYTHROCYTE [DISTWIDTH] IN BLOOD BY AUTOMATED COUNT: 12.9 % (ref 10–15)
HCT VFR BLD AUTO: 41.8 % (ref 35–47)
HGB BLD-MCNC: 13.7 G/DL (ref 11.7–15.7)
IMM GRANULOCYTES # BLD: 0 10E3/UL
IMM GRANULOCYTES NFR BLD: 0 %
LYMPHOCYTES # BLD AUTO: 1.8 10E3/UL (ref 0.8–5.3)
LYMPHOCYTES NFR BLD AUTO: 36 %
MCH RBC QN AUTO: 29 PG (ref 26.5–33)
MCHC RBC AUTO-ENTMCNC: 32.8 G/DL (ref 31.5–36.5)
MCV RBC AUTO: 89 FL (ref 78–100)
MONOCYTES # BLD AUTO: 0.6 10E3/UL (ref 0–1.3)
MONOCYTES NFR BLD AUTO: 11 %
NEUTROPHILS # BLD AUTO: 2.4 10E3/UL (ref 1.6–8.3)
NEUTROPHILS NFR BLD AUTO: 49 %
PLATELET # BLD AUTO: 228 10E3/UL (ref 150–450)
RBC # BLD AUTO: 4.72 10E6/UL (ref 3.8–5.2)
WBC # BLD AUTO: 4.9 10E3/UL (ref 4–11)

## 2023-08-04 PROCEDURE — 36415 COLL VENOUS BLD VENIPUNCTURE: CPT | Performed by: PHYSICIAN ASSISTANT

## 2023-08-04 PROCEDURE — 85025 COMPLETE CBC W/AUTO DIFF WBC: CPT | Mod: QW | Performed by: PHYSICIAN ASSISTANT

## 2023-08-04 PROCEDURE — 99213 OFFICE O/P EST LOW 20 MIN: CPT | Performed by: PHYSICIAN ASSISTANT

## 2023-08-04 ASSESSMENT — ENCOUNTER SYMPTOMS
CONSTITUTIONAL NEGATIVE: 1
BRUISES/BLEEDS EASILY: 1
GASTROINTESTINAL NEGATIVE: 1
ACTIVITY CHANGE: 0
UNEXPECTED WEIGHT CHANGE: 0
FATIGUE: 0

## 2023-08-04 NOTE — PROGRESS NOTES
"  Assessment & Plan     Bruising  CBC and observation if abnormal will work-up appropriately otherwise she will follow if she has recurrent issues  - CBC with Platelets & Differential             BMI:   Estimated body mass index is 27.1 kg/m  as calculated from the following:    Height as of this encounter: 1.6 m (5' 3\").    Weight as of this encounter: 69.4 kg (153 lb).           KHRIS Nix  [facility]    Jennifer Grace is a 58 year old, presenting for the following health issues:  HEMATOMA, RIGHT ARM      Patient presents to the clinic with complaint of bruising to her right upper arm  This happened once a few weeks ago and then happened again  She has not had any other abnormal bruising  She has not had any abnormal bleeding such as epistaxis bleeding from gums with brushing teeth no dark stools no bright red blood per rectum  She generally feels well                 Review of Systems   Constitutional: Negative.  Negative for activity change, fatigue and unexpected weight change.   HENT: Negative.     Gastrointestinal: Negative.    Hematological:  Bruises/bleeds easily.            Objective    /76   Pulse 62   Temp 98.1  F (36.7  C)   Resp 18   Ht 1.6 m (5' 3\")   Wt 69.4 kg (153 lb)   LMP  (LMP Unknown)   SpO2 98%   BMI 27.10 kg/m    Body mass index is 27.1 kg/m .  Physical Exam 5 x 3 cm resolving ecchymosis right upper arm there is no induration there is no erythema.  I did review a photo of her previous bruise  No acute distress                        "

## 2023-08-08 ASSESSMENT — ENCOUNTER SYMPTOMS
NAUSEA: 0
FEVER: 0
CONSTIPATION: 0
FREQUENCY: 0
DIARRHEA: 0
HEARTBURN: 0
HEMATOCHEZIA: 0
HEADACHES: 1
HEMATURIA: 0
DIZZINESS: 0
SHORTNESS OF BREATH: 0
PALPITATIONS: 0
WEAKNESS: 0
ARTHRALGIAS: 0
ABDOMINAL PAIN: 0
BREAST MASS: 0
SORE THROAT: 0
NERVOUS/ANXIOUS: 0
MYALGIAS: 0
CHILLS: 0
PARESTHESIAS: 0
DYSURIA: 0
EYE PAIN: 0
COUGH: 0
JOINT SWELLING: 0

## 2023-08-10 ENCOUNTER — OFFICE VISIT (OUTPATIENT)
Dept: FAMILY MEDICINE | Facility: CLINIC | Age: 58
End: 2023-08-10
Payer: COMMERCIAL

## 2023-08-10 VITALS
OXYGEN SATURATION: 99 % | RESPIRATION RATE: 14 BRPM | SYSTOLIC BLOOD PRESSURE: 108 MMHG | DIASTOLIC BLOOD PRESSURE: 60 MMHG | HEIGHT: 62 IN | HEART RATE: 61 BPM | WEIGHT: 151.2 LBS | TEMPERATURE: 97.3 F | BODY MASS INDEX: 27.82 KG/M2

## 2023-08-10 DIAGNOSIS — Z00.00 ROUTINE GENERAL MEDICAL EXAMINATION AT A HEALTH CARE FACILITY: Primary | ICD-10-CM

## 2023-08-10 DIAGNOSIS — G43.709 CHRONIC MIGRAINE WITHOUT AURA WITHOUT STATUS MIGRAINOSUS, NOT INTRACTABLE: ICD-10-CM

## 2023-08-10 DIAGNOSIS — Z12.4 CERVICAL CANCER SCREENING: ICD-10-CM

## 2023-08-10 DIAGNOSIS — Z12.11 SCREEN FOR COLON CANCER: ICD-10-CM

## 2023-08-10 PROCEDURE — 90750 HZV VACC RECOMBINANT IM: CPT | Performed by: FAMILY MEDICINE

## 2023-08-10 PROCEDURE — 99396 PREV VISIT EST AGE 40-64: CPT | Mod: 25 | Performed by: FAMILY MEDICINE

## 2023-08-10 PROCEDURE — G0145 SCR C/V CYTO,THINLAYER,RESCR: HCPCS | Performed by: FAMILY MEDICINE

## 2023-08-10 PROCEDURE — 90471 IMMUNIZATION ADMIN: CPT | Performed by: FAMILY MEDICINE

## 2023-08-10 PROCEDURE — 87624 HPV HI-RISK TYP POOLED RSLT: CPT | Performed by: FAMILY MEDICINE

## 2023-08-10 RX ORDER — KETOROLAC TROMETHAMINE 10 MG/1
10 TABLET, FILM COATED ORAL 3 TIMES DAILY PRN
Qty: 10 TABLET | Refills: 1 | Status: SHIPPED | OUTPATIENT
Start: 2023-08-10 | End: 2023-08-10

## 2023-08-10 RX ORDER — KETOROLAC TROMETHAMINE 10 MG/1
10 TABLET, FILM COATED ORAL 3 TIMES DAILY PRN
Qty: 10 TABLET | Refills: 11 | Status: SHIPPED | OUTPATIENT
Start: 2023-08-10 | End: 2024-06-20

## 2023-08-10 ASSESSMENT — ENCOUNTER SYMPTOMS
ALLERGIC/IMMUNOLOGIC NEGATIVE: 1
DYSURIA: 0
ABDOMINAL PAIN: 0
NAUSEA: 0
BREAST MASS: 0
JOINT SWELLING: 0
MYALGIAS: 0
EYE PAIN: 0
NERVOUS/ANXIOUS: 0
HEADACHES: 1
ARTHRALGIAS: 0
COUGH: 0
WEAKNESS: 0
SHORTNESS OF BREATH: 0
FREQUENCY: 0
PARESTHESIAS: 0
FEVER: 0
CONSTIPATION: 0
HEMATOLOGIC/LYMPHATIC NEGATIVE: 1
DIZZINESS: 0
HEMATOCHEZIA: 0
SORE THROAT: 0
ENDOCRINE NEGATIVE: 1
HEMATURIA: 0
CHILLS: 0
DIARRHEA: 0
PALPITATIONS: 0
HEARTBURN: 0

## 2023-08-10 NOTE — PROGRESS NOTES
SUBJECTIVE:   CC: Lesly is an 58 year old who presents for preventive health visit.       8/10/2023     8:31 AM   Additional Questions   Roomed by Elizabeth SALINAS CMA   Accompanied by Raj       Healthy Habits:     Getting at least 3 servings of Calcium per day:  Yes    Bi-annual eye exam:  Yes    Dental care twice a year:  Yes    Sleep apnea or symptoms of sleep apnea:  Daytime drowsiness    Diet:  Regular (no restrictions)    Frequency of exercise:  2-3 days/week    Duration of exercise:  15-30 minutes    Taking medications regularly:  Yes    Barriers to taking medications:  None    Medication side effects:  Not applicable    Additional concerns today:  No    Have you ever done Advance Care Planning? (For example, a Health Directive, POLST, or a discussion with a medical provider or your loved ones about your wishes): No, advance care planning information given to patient to review.  Patient declined advance care planning discussion at this time.    Social History     Tobacco Use    Smoking status: Former     Types: Cigarettes     Start date:      Quit date:      Years since quittin.6     Passive exposure: Past    Smokeless tobacco: Never   Substance Use Topics    Alcohol use: Not Currently             2023     3:50 AM   Alcohol Use   Prescreen: >3 drinks/day or >7 drinks/week? Not Applicable     Reviewed orders with patient.  Reviewed health maintenance and updated orders accordingly - Yes      Breast Cancer Screenin/8/2023     3:51 AM   Breast CA Risk Assessment (FHS-7)   Do you have a family history of breast, colon, or ovarian cancer? No / Unknown         Mammogram Screening: Recommended annual mammography  Pertinent mammograms are reviewed under the imaging tab.    History of abnormal Pap smear: NO - age 30-65 PAP every 5 years with negative HPV co-testing recommended     Reviewed and updated as needed this visit by clinical staff   Tobacco  Allergies  Meds  Problems  Med Hx   "Surg Hx  Fam Hx          Reviewed and updated as needed this visit by Provider   Tobacco  Allergies  Meds  Problems  Med Hx  Surg Hx  Fam Hx             Review of Systems   Constitutional:  Negative for chills and fever.   HENT:  Negative for congestion, ear pain, hearing loss and sore throat.    Eyes:  Negative for pain and visual disturbance.   Respiratory:  Negative for cough and shortness of breath.    Cardiovascular:  Negative for chest pain, palpitations and peripheral edema.   Gastrointestinal:  Negative for abdominal pain, constipation, diarrhea, heartburn, hematochezia and nausea.   Endocrine: Negative.    Breasts:  Negative for tenderness, breast mass and discharge.   Genitourinary:  Negative for dysuria, frequency, genital sores, hematuria, pelvic pain, urgency, vaginal bleeding and vaginal discharge.   Musculoskeletal:  Negative for arthralgias, joint swelling and myalgias.   Skin: Negative.    Allergic/Immunologic: Negative.    Neurological:  Positive for headaches. Negative for dizziness, weakness and paresthesias.   Hematological: Negative.    Psychiatric/Behavioral:  Negative for mood changes. The patient is not nervous/anxious.           OBJECTIVE:   /60   Pulse 61   Temp 97.3  F (36.3  C)   Resp 14   Ht 1.579 m (5' 2.17\")   Wt 68.6 kg (151 lb 3.2 oz)   LMP  (LMP Unknown)   SpO2 99%   BMI 27.51 kg/m    Physical Exam  GENERAL: healthy, alert and no distress  EYES: Eyes grossly normal to inspection, PERRL and conjunctivae and sclerae normal  HENT: ear canals and TM's normal, nose and mouth without ulcers or lesions  NECK: no adenopathy, no asymmetry, masses, or scars and thyroid normal to palpation  RESP: lungs clear to auscultation - no rales, rhonchi or wheezes  BREAST: normal without masses, tenderness or nipple discharge and no palpable axillary masses or adenopathy  CV: regular rate and rhythm, normal S1 S2, no S3 or S4, no murmur, click or rub, no peripheral edema and " "peripheral pulses strong  ABDOMEN: soft, nontender, no hepatosplenomegaly, no masses and bowel sounds normal   (female): normal female external genitalia, normal urethral meatus, vaginal mucosa pink, moist, well rugated, and normal cervix/adnexa/uterus without masses or discharge  MS: no gross musculoskeletal defects noted, no edema  SKIN: no suspicious lesions or rashes  NEURO: Normal strength and tone, mentation intact and speech normal  PSYCH: mentation appears normal, affect normal/bright    Diagnostic Test Results:  Labs reviewed in Epic    ASSESSMENT/PLAN:   Routine general medical examination at a health care facility  Health maintenance updated, preventive services reviewed, vaccines discussed, questions are answered, patient encouraged to continue annual wellness visits to review preventive services    Chronic migraine without aura without status migrainosus, not intractable  stable  - ketorolac (TORADOL) 10 MG tablet; Take 1 tablet (10 mg) by mouth 3 times daily as needed for moderate pain (use for no more than 3 days in a row)    Screen for colon cancer  Reviewed options, will do cologuard  - COLOGUARD([employer]); Future    Cervical cancer screening  Pap smear completed today  - Pap Screen with HPV - recommended age 30 - 65 years            COUNSELING:  Reviewed preventive health counseling, as reflected in patient instructions      BMI:   Estimated body mass index is 27.51 kg/m  as calculated from the following:    Height as of this encounter: 1.579 m (5' 2.17\").    Weight as of this encounter: 68.6 kg (151 lb 3.2 oz).         She reports that she quit smoking about 20 years ago. Her smoking use included cigarettes. She started smoking about 44 years ago. She has been exposed to tobacco smoke. She has never used smokeless tobacco.          Edith Yu MD  [facility]  "

## 2023-08-14 LAB
BKR LAB AP GYN ADEQUACY: NORMAL
BKR LAB AP GYN INTERPRETATION: NORMAL
BKR LAB AP HPV REFLEX: NORMAL
BKR LAB AP PREVIOUS ABNORMAL: NORMAL
PATH REPORT.COMMENTS IMP SPEC: NORMAL
PATH REPORT.COMMENTS IMP SPEC: NORMAL
PATH REPORT.RELEVANT HX SPEC: NORMAL

## 2023-08-16 LAB
HUMAN PAPILLOMA VIRUS 16 DNA: NEGATIVE
HUMAN PAPILLOMA VIRUS 18 DNA: NEGATIVE
HUMAN PAPILLOMA VIRUS FINAL DIAGNOSIS: NORMAL
HUMAN PAPILLOMA VIRUS OTHER HR: NEGATIVE

## 2023-09-10 ENCOUNTER — LAB (OUTPATIENT)
Dept: FAMILY MEDICINE | Facility: CLINIC | Age: 58
End: 2023-09-10
Payer: COMMERCIAL

## 2023-09-10 DIAGNOSIS — Z12.11 SCREEN FOR COLON CANCER: ICD-10-CM

## 2023-09-25 LAB — NONINV COLON CA DNA+OCC BLD SCRN STL QL: NEGATIVE

## 2023-10-02 ENCOUNTER — NURSE TRIAGE (OUTPATIENT)
Dept: NURSING | Facility: CLINIC | Age: 58
End: 2023-10-02

## 2023-10-02 NOTE — TELEPHONE ENCOUNTER
Looks like she has an appointment with Omkar this afternoon but not sure if that is related.  Might be appropriate for RN COVID protocol.  Please let me know if there is something needed for me.

## 2023-10-02 NOTE — TELEPHONE ENCOUNTER
Pt calling with concerns of testing positive for Covid on Friday 9/29, symptoms started on Tuesday 9/26.    Pt have symptoms of Sore throat, barely able to swallow, Fever, Body aches, headache, cough, nausea. Not sure if she has strep  Feels Weak, overall not well. Feels symptoms are getting worse.   Pt is requesting for medications to help with symptoms    Advised pt call back by care team on care recommendation. Care advice given. May need to be evaluated in clinic. Advised UC if no appointments available. Pt disconnected call during warm transfer.     Farrah Eid, RN, BSN  10/2/2023 at 7:57 AM  [facility]      Reason for Disposition   HIGH RISK patient (e.g., weak immune system, age > 64 years, obesity with BMI of 30 or higher, pregnant, chronic lung disease or other chronic medical condition) and COVID symptoms (e.g., cough, fever)  (Exceptions: Already seen by doctor or NP/PA and no new or worsening symptoms.)    Additional Information   Negative: SEVERE difficulty breathing (e.g., struggling for each breath, speaks in single words)   Negative: Difficult to awaken or acting confused (e.g., disoriented, slurred speech)   Negative: Bluish (or gray) lips or face now   Negative: Shock suspected (e.g., cold/pale/clammy skin, too weak to stand, low BP, rapid pulse)   Negative: Sounds like a life-threatening emergency to the triager   Negative: SEVERE or constant chest pain or pressure  (Exception: Mild central chest pain, present only when coughing.)   Negative: MODERATE difficulty breathing (e.g., speaks in phrases, SOB even at rest, pulse 100-120)   Negative: Headache and stiff neck (can't touch chin to chest)   Negative: Oxygen level (e.g., pulse oximetry) 90% or lower   Negative: Chest pain or pressure  (Exception: MILD central chest pain, present only when coughing.)   Negative: Drinking very little and dehydration suspected (e.g., no urine > 12 hours, very dry mouth, very lightheaded)   Negative:  Patient sounds very sick or weak to the triager   Negative: MILD difficulty breathing (e.g., minimal/no SOB at rest, SOB with walking, pulse <100)   Negative: Fever > 103 F (39.4 C)   Negative: Fever > 101 F (38.3 C) and over 60 years of age   Negative: Fever > 100.0 F (37.8 C) and bedridden (e.g., CVA, chronic illness, recovering from surgery)    Protocols used: Coronavirus (COVID-19) Diagnosed or [id number]

## 2023-10-02 NOTE — TELEPHONE ENCOUNTER
Patient's symptoms started 9/26 - outside of the RN protocol for Paxlovid as symptoms started more than 5 days ago.   Provider to address at visit today.

## 2023-10-13 ENCOUNTER — TELEPHONE (OUTPATIENT)
Dept: NURSING | Facility: CLINIC | Age: 58
End: 2023-10-13

## 2023-10-13 ENCOUNTER — OFFICE VISIT (OUTPATIENT)
Dept: FAMILY MEDICINE | Facility: CLINIC | Age: 58
End: 2023-10-13
Payer: COMMERCIAL

## 2023-10-13 VITALS
TEMPERATURE: 97.4 F | SYSTOLIC BLOOD PRESSURE: 108 MMHG | OXYGEN SATURATION: 99 % | BODY MASS INDEX: 27.6 KG/M2 | RESPIRATION RATE: 20 BRPM | WEIGHT: 150 LBS | HEIGHT: 62 IN | DIASTOLIC BLOOD PRESSURE: 62 MMHG | HEART RATE: 85 BPM

## 2023-10-13 DIAGNOSIS — J06.9 VIRAL UPPER RESPIRATORY TRACT INFECTION: Primary | ICD-10-CM

## 2023-10-13 LAB
FLUAV RNA SPEC QL NAA+PROBE: NEGATIVE
FLUBV RNA RESP QL NAA+PROBE: NEGATIVE
RSV RNA SPEC NAA+PROBE: NEGATIVE
SARS-COV-2 RNA RESP QL NAA+PROBE: POSITIVE

## 2023-10-13 PROCEDURE — 99213 OFFICE O/P EST LOW 20 MIN: CPT | Performed by: FAMILY MEDICINE

## 2023-10-13 PROCEDURE — 87637 SARSCOV2&INF A&B&RSV AMP PRB: CPT | Performed by: FAMILY MEDICINE

## 2023-10-13 RX ORDER — BENZONATATE 100 MG/1
100 CAPSULE ORAL EVERY 4 HOURS PRN
COMMUNITY
Start: 2023-10-02 | End: 2024-09-17

## 2023-10-13 ASSESSMENT — ENCOUNTER SYMPTOMS
SINUS PRESSURE: 1
RHINORRHEA: 1
DIZZINESS: 1
CHILLS: 1
COUGH: 1
FATIGUE: 1
NAUSEA: 1
MYALGIAS: 1
HEADACHES: 1
APPETITE CHANGE: 1
LIGHT-HEADEDNESS: 1

## 2023-10-13 NOTE — TELEPHONE ENCOUNTER
Patient classified as COVID treatment eligible by Epic high risk algorithm:  Yes    Coronavirus (COVID-19) Notification    Reason for call  Notify of POSITIVE COVID-19 lab result, assess symptoms,  review [facility] recommendations    Lab Result   Lab test for 2019-nCoV rRt-PCR or SARS-COV-2 PCR  Oropharyngeal AND/OR nasopharyngeal swabs were POSITIVE for 2019-nCoV RNA [OR] SARS-COV-2 RNA (COVID-19) RNA     We have been unable to reach patient by phone at this time to notify of their Positive COVID-19 result.    Left voicemail message requesting a call back to [phone] [facility] for results.        A Positive COVID-19 letter will be sent via [employer] or the mail.    NAV CALDERON

## 2023-10-13 NOTE — PROGRESS NOTES
Assessment & Plan     Viral upper respiratory tract infection  Quarantine, f/u depending on results  - Symptomatic Influenza A/B, RSV, & SARS-CoV2 PCR (COVID-19) Nasopharyngeal                 Orlando Daugherty MD  [facility]    Jennifer Grace is a 58 year old, presenting for the following health issues:  Covid Concern (Tested positive for covid 2 weeks ago. Ongoing symptoms.)      10/13/2023     9:39 AM   Additional Questions   Roomed by srud   Accompanied by n/a       History of Present Illness       Reason for visit:  Covid. Flu symptoms    She eats 2-3 servings of fruits and vegetables daily.She consumes 0 sweetened beverage(s) daily.She exercises with enough effort to increase her heart rate 30 to 60 minutes per day.  She exercises with enough effort to increase her heart rate 5 days per week.   She is taking medications regularly.           COVID-19 Symptom Review  How many days ago did these symptoms start? 15 days    Are any of the following symptoms significant for you?  New or worsening difficulty breathing? No  Worsening cough? Yes, it's a dry cough.   Fever or chills? No  Headache: YES  Sore throat: YES  Chest pain: No  Diarrhea: No  Body aches? YES    What treatments has patient tried? Acetaminophen, Nonsteroidals, and Decongestant - oral   Does patient live in a nursing home, group home, or shelter? No  Does patient have a way to get food/medications during quarantined? Yes, I have a friend or family member who can help me.                Review of Systems   Constitutional:  Positive for appetite change, chills and fatigue.   HENT:  Positive for congestion, ear pain, rhinorrhea and sinus pressure.    Respiratory:  Positive for cough.    Gastrointestinal:  Positive for nausea.   Musculoskeletal:  Positive for myalgias.   Neurological:  Positive for dizziness, light-headedness and headaches.            Objective    /62 (BP Location: Right arm, Patient  "Position: Sitting)   Pulse 85   Temp 97.4  F (36.3  C) (Tympanic)   Resp 20   Ht 1.579 m (5' 2.17\")   Wt 68 kg (150 lb)   LMP  (LMP Unknown)   SpO2 99%   BMI 27.29 kg/m    Body mass index is 27.29 kg/m .  Physical Exam   GENERAL: healthy, alert and no distress  HENT: normal cephalic/atraumatic, ear canals and TM's normal, nasal mucosa edematous , rhinorrhea clear, green, and white, oropharynx clear, and oral mucous membranes moist  NECK: no adenopathy, no asymmetry, masses, or scars and thyroid normal to palpation  RESP: lungs clear to auscultation - no rales, rhonchi or wheezes  CV: regular rate and rhythm, normal S1 S2, no S3 or S4, no murmur, click or rub, no peripheral edema and peripheral pulses strong  ABDOMEN: soft, nontender, no hepatosplenomegaly, no masses and bowel sounds normal  MS: no gross musculoskeletal defects noted, no edema                      "

## 2024-05-03 ENCOUNTER — OFFICE VISIT (OUTPATIENT)
Dept: FAMILY MEDICINE | Facility: CLINIC | Age: 59
End: 2024-05-03
Payer: COMMERCIAL

## 2024-05-03 VITALS
RESPIRATION RATE: 16 BRPM | DIASTOLIC BLOOD PRESSURE: 71 MMHG | TEMPERATURE: 98.1 F | BODY MASS INDEX: 28.26 KG/M2 | SYSTOLIC BLOOD PRESSURE: 108 MMHG | OXYGEN SATURATION: 97 % | HEIGHT: 62 IN | WEIGHT: 153.6 LBS | HEART RATE: 70 BPM

## 2024-05-03 DIAGNOSIS — J06.9 VIRAL UPPER RESPIRATORY TRACT INFECTION: ICD-10-CM

## 2024-05-03 DIAGNOSIS — J02.9 SORE THROAT: Primary | ICD-10-CM

## 2024-05-03 LAB
DEPRECATED S PYO AG THROAT QL EIA: NEGATIVE
FLUAV RNA SPEC QL NAA+PROBE: NEGATIVE
FLUBV RNA RESP QL NAA+PROBE: NEGATIVE
GROUP A STREP BY PCR: NOT DETECTED
RSV RNA SPEC NAA+PROBE: NEGATIVE
SARS-COV-2 RNA RESP QL NAA+PROBE: NEGATIVE

## 2024-05-03 PROCEDURE — 87651 STREP A DNA AMP PROBE: CPT

## 2024-05-03 PROCEDURE — 87637 SARSCOV2&INF A&B&RSV AMP PRB: CPT

## 2024-05-03 PROCEDURE — 99213 OFFICE O/P EST LOW 20 MIN: CPT

## 2024-05-03 NOTE — PROGRESS NOTES
"  Assessment & Plan     Sore throat  Sore throat for approximately 5 days.  She has had no known exposure to illness, she has not had fevers, she is also having a headache and some neck stiffness.  Present ski and Kernig sign negative.  Rapid strep is negative today.  Discussed likely upper respiratory infection with patient, discussed red flags including signs of meningitis and patient to seek immediate medical care should this occur.  - Streptococcus A Rapid Screen w/Reflex to PCR - Clinic Collect  - Group A Streptococcus PCR Throat Swab    Viral upper respiratory tract infection  Patient is interested in viral testing since strep is negative.  She will be around her 2-month-old and 2-year-old grandchildren this weekend both of whom are completely unvaccinated.  Patient help to set up MyChart so that she can receive negative results over the weekend.  Discussed supportive measures, refill and ibuprofen, salt water gargles for throat, and nasal saline for congestion.  - Symptomatic Influenza A/B, RSV, & SARS-CoV2 PCR (COVID-19) Nose; Future  - Symptomatic Influenza A/B, RSV, & SARS-CoV2 PCR (COVID-19) Nose            BMI  Estimated body mass index is 28.09 kg/m  as calculated from the following:    Height as of this encounter: 1.575 m (5' 2\").    Weight as of this encounter: 69.7 kg (153 lb 9.6 oz).             Jennifer Grace is a 59 year old, presenting for the following health issues:  Pain (Pain in neck, headache , sore throat )        5/3/2024     3:37 PM   Additional Questions   Roomed by JASON Fleming     5 days of headache, neck pain, sore throat. No fevers, no N/V/D. No known exposure    History of Present Illness       Headaches:   Since the patient's last clinic visit, headaches are: no change  The patient is getting headaches:  Daily  She is not able to do normal daily activities when she has a migraine.  The patient is taking the following rescue/relief medications:  Other   Patient states \"I get " "some relief\" from the rescue/relief medications.   The patient is taking the following medications to prevent migraines:  No medications to prevent migraines  In the past 4 weeks, the patient has gone to an Urgent Care or Emergency Room 0 times times due to headaches.    Reason for visit:  Sore throat headache neck pain  Symptom onset:  3-7 days ago  Symptom intensity:  Moderate  Symptom progression:  Worsening  Had these symptoms before:  No  What makes it worse:  No  What makes it better:  Walking    She eats 2-3 servings of fruits and vegetables daily.She consumes 0 sweetened beverage(s) daily.She exercises with enough effort to increase her heart rate 30 to 60 minutes per day.  She exercises with enough effort to increase her heart rate 4 days per week.   She is taking medications regularly.                     Objective    /71 (BP Location: Right arm, Patient Position: Sitting, Cuff Size: Adult Large)   Pulse 70   Temp 98.1  F (36.7  C) (Oral)   Resp 16   Ht 1.575 m (5' 2\")   Wt 69.7 kg (153 lb 9.6 oz)   SpO2 97%   BMI 28.09 kg/m    Body mass index is 28.09 kg/m .  Physical Exam  HENT:      Head: Normocephalic.      Right Ear: Tympanic membrane normal.      Left Ear: Tympanic membrane normal.      Mouth/Throat:      Mouth: Mucous membranes are moist.      Pharynx: Oropharynx is clear. Posterior oropharyngeal erythema present. No oropharyngeal exudate.   Eyes:      Extraocular Movements: Extraocular movements intact.      Conjunctiva/sclera: Conjunctivae normal.   Cardiovascular:      Rate and Rhythm: Normal rate.   Pulmonary:      Effort: Pulmonary effort is normal.   Abdominal:      General: There is no distension.      Palpations: Abdomen is soft.   Musculoskeletal:      Cervical back: Normal range of motion and neck supple. No rigidity or tenderness.   Lymphadenopathy:      Cervical: No cervical adenopathy.   Skin:     General: Skin is warm and dry.      Capillary Refill: Capillary refill takes " less than 2 seconds.   Neurological:      Mental Status: She is alert and oriented to person, place, and time.   Psychiatric:         Behavior: Behavior normal.         Thought Content: Thought content normal.                    Signed Electronically by: RICO Acosta CNP

## 2024-05-16 ENCOUNTER — NURSE TRIAGE (OUTPATIENT)
Dept: NURSING | Facility: CLINIC | Age: 59
End: 2024-05-16
Payer: COMMERCIAL

## 2024-05-16 NOTE — TELEPHONE ENCOUNTER
Nurse Triage SBAR    Is this a 2nd Level Triage? NO    Situation: Dog Bite    Background: Pt was walking near the street on her house and an unknown animal ran up to her and bit her left leg unprovoked.    Assessment: Bleeding is now controlled and wound has been cleaned and bandaged. Did puncture skin and dog is not known to Pt or      Protocol Recommended Disposition:   Go to ED Now    Recommendation: Advised to be seen in ED now, transport in private vehicle. Pt and  agreeable to going.        Does the patient meet one of the following criteria for ADS visit consideration? 16+ years old, with an [facility] PCP     TIP  Providers, please consider if this condition is appropriate for management at one of our Acute and Diagnostic Services sites.     If patient is a good candidate, please use dotphrase <dot>triageresponse and select Refer to ADS to document.        Reason for Disposition   Any break in skin from BITE (e.g., cut, puncture, or scratch) and PET animal (e.g., dog, cat, or ferret) at risk for RABIES (e.g., sick, stray, unprovoked bite, developing country)    Additional Information   Negative: Major bleeding (actively dripping or spurting) that can't be stopped   Negative: Sounds like a life-threatening emergency to the triager   Negative: Any break in skin from BITE (e.g., cut, puncture, or scratch) and WILD animal at risk for RABIES (e.g., bat, raccoon, lozano, skunk, coyote, other carnivores; see Background for list)    Protocols used: Animal Bite-A-[place]

## 2024-05-21 ENCOUNTER — OFFICE VISIT (OUTPATIENT)
Dept: FAMILY MEDICINE | Facility: CLINIC | Age: 59
End: 2024-05-21
Payer: COMMERCIAL

## 2024-05-21 VITALS
SYSTOLIC BLOOD PRESSURE: 109 MMHG | DIASTOLIC BLOOD PRESSURE: 74 MMHG | HEIGHT: 62 IN | WEIGHT: 152.9 LBS | RESPIRATION RATE: 16 BRPM | HEART RATE: 63 BPM | OXYGEN SATURATION: 98 % | BODY MASS INDEX: 28.14 KG/M2 | TEMPERATURE: 97.8 F
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
## 2024-05-21 PROCEDURE — 99212 OFFICE O/P EST SF 10 MIN: CPT

## 2024-05-21 NOTE — PROGRESS NOTES
"  Assessment & Plan     Dog bite, subsequent encounter  Patient with dog bite to left posterior calf.  Appears to be healing well, no surrounding erythema, there is mild edema to the ankle, nonpitting patient reports she is getting pain at the bottom of her foot, no wounds noted, there is no obvious swelling of foot.  Pedal pulses are equal and brisk.  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  Discussed that pain in bottom of foot is likely due to mild swelling and that it does not appear to be any signs of worsening infection.  Patient has not had any fevers and will continue to monitor for any signs of developing infection.          BMI  Estimated body mass index is 27.97 kg/m  as calculated from the following:    Height as of this encounter: 1.575 m (5' 2\").    Weight as of this encounter: 69.4 kg (152 lb 14.4 oz).             Jennifer Grace is a 59 year old, presenting for the following health issues:  Trauma (Dog bite 05/17/2024 left ankle )        5/21/2024     8:40 AM   Additional Questions   Roomed by JASON Fleming     History of Present Illness       Reason for visit:  Dog bite on my left heel.  Symptom onset:  1-3 days ago  Symptoms include:  Foot pain. From a dog bite  Symptom intensity:  Moderate  Symptom progression:  Worsening  Had these symptoms before:  No  What makes it worse:  Standing  What makes it better:  Soaking my foot    She eats 2-3 servings of fruits and vegetables daily.She consumes 0 sweetened beverage(s) daily.She exercises with enough effort to increase her heart rate 30 to 60 minutes per day.  She exercises with enough effort to increase her heart rate 4 days per week.   She is taking medications regularly.                     Objective    /74 (BP Location: Right arm, Patient Position: Sitting, Cuff Size: Adult Large)   Pulse 63   Temp 97.8  F (36.6  C) (Oral)   " "Resp 16   Ht 1.575 m (5' 2\")   Wt 69.4 kg (152 lb 14.4 oz)   SpO2 98%   BMI 27.97 kg/m    Body mass index is 27.97 kg/m .  Physical Exam  HENT:      Head: Normocephalic.      Mouth/Throat:      Mouth: Mucous membranes are moist.      Pharynx: Oropharynx is clear.   Eyes:      Extraocular Movements: Extraocular movements intact.      Conjunctiva/sclera: Conjunctivae normal.   Cardiovascular:      Rate and Rhythm: Normal rate.   Pulmonary:      Effort: Pulmonary effort is normal.   Musculoskeletal:        Legs:    Skin:     General: Skin is warm and dry.      Capillary Refill: Capillary refill takes less than 2 seconds.   Neurological:      Mental Status: She is alert and oriented to person, place, and time.   Psychiatric:         Behavior: Behavior normal.         Thought Content: Thought content normal.                    Signed Electronically by: RICO Acosta CNP    "

## 2024-06-20 DIAGNOSIS — G43.709 CHRONIC MIGRAINE WITHOUT AURA WITHOUT STATUS MIGRAINOSUS, NOT INTRACTABLE: ICD-10-CM

## 2024-06-20 RX ORDER — KETOROLAC TROMETHAMINE 10 MG/1
10 TABLET, FILM COATED ORAL 3 TIMES DAILY PRN
Qty: 10 TABLET | Refills: 1 | Status: SHIPPED | OUTPATIENT
Start: 2024-06-20 | End: 2024-09-17

## 2024-07-29 ENCOUNTER — TELEPHONE (OUTPATIENT)
Dept: LAB | Facility: CLINIC | Age: 59
End: 2024-07-29
Payer: COMMERCIAL

## 2024-07-29 DIAGNOSIS — G43.709 CHRONIC MIGRAINE WITHOUT AURA WITHOUT STATUS MIGRAINOSUS, NOT INTRACTABLE: Primary | ICD-10-CM

## 2024-07-29 RX ORDER — ONDANSETRON 4 MG/1
TABLET, ORALLY DISINTEGRATING ORAL
Qty: 9 TABLET | Refills: 5 | OUTPATIENT
Start: 2024-07-29

## 2024-08-02 ENCOUNTER — MYC MEDICAL ADVICE (OUTPATIENT)
Dept: FAMILY MEDICINE | Facility: CLINIC | Age: 59
End: 2024-08-02
Payer: COMMERCIAL

## 2024-08-02 RX ORDER — ONDANSETRON 4 MG/1
4 TABLET, FILM COATED ORAL 3 TIMES DAILY PRN
Qty: 9 TABLET | Refills: 5 | Status: SHIPPED | OUTPATIENT
Start: 2024-08-02

## 2024-08-02 NOTE — TELEPHONE ENCOUNTER
Medication Question or Refill    What medication are you calling about (include dose and sig)?: ondansetron (ZOFRAN ODT) 4 MG ODT tab     Preferred Pharmacy:   [facility] [place]  [address] [facility] [address]  Phone: [phone] Fax: [phone]      Controlled Substance Agreement on file:   CSA -- Patient Level:    CSA: None found at the patient level.       Who prescribed the medication?: Gigi    Do you need a refill? Yes    Do you have any questions or concerns?  Yes: Pt mentioned that she was not aware this med was discontinued per pharmacy. Pt would like refill, as symptoms are still occasionally present. Mentioned she uses only when she needs- migraine/nausea       [facility] or would you prefer to receive a phone call?:   Patient would prefer a phone call   Okay to leave a detailed message?: Yes at Cell number on file:    Telephone Information:   Mobile [phone]

## 2024-08-02 NOTE — TELEPHONE ENCOUNTER
Last OV 5/21/24, with Cristina Mckinley. Zofran discontinued. Patient still uses this on as needed basis and would like refill sent to pharmacy.     Last Written Prescription Date:  5/21/24  Last Fill Quantity: 9,  # refills: 5   Last office visit: 5/21/24

## 2024-09-08 ENCOUNTER — ANCILLARY PROCEDURE (OUTPATIENT)
Dept: GENERAL RADIOLOGY | Facility: CLINIC | Age: 59
End: 2024-09-08
Attending: FAMILY MEDICINE
Payer: COMMERCIAL

## 2024-09-08 ENCOUNTER — OFFICE VISIT (OUTPATIENT)
Dept: URGENT CARE | Facility: URGENT CARE | Age: 59
End: 2024-09-08
Payer: COMMERCIAL

## 2024-09-08 VITALS
TEMPERATURE: 97.3 F | DIASTOLIC BLOOD PRESSURE: 74 MMHG | HEART RATE: 63 BPM | BODY MASS INDEX: 27.98 KG/M2 | WEIGHT: 153 LBS | OXYGEN SATURATION: 98 % | SYSTOLIC BLOOD PRESSURE: 119 MMHG

## 2024-09-08 DIAGNOSIS — R53.83 OTHER FATIGUE: Primary | ICD-10-CM

## 2024-09-08 DIAGNOSIS — R53.83 OTHER FATIGUE: ICD-10-CM

## 2024-09-08 LAB
ALBUMIN SERPL BCG-MCNC: 4.3 G/DL (ref 3.5–5.2)
ALBUMIN UR-MCNC: NEGATIVE MG/DL
ALP SERPL-CCNC: 77 U/L (ref 40–150)
ALT SERPL W P-5'-P-CCNC: 5 U/L (ref 0–50)
ANION GAP SERPL CALCULATED.3IONS-SCNC: 10 MMOL/L (ref 7–15)
APPEARANCE UR: CLEAR
AST SERPL W P-5'-P-CCNC: 26 U/L (ref 0–45)
BACTERIA #/AREA URNS HPF: ABNORMAL /HPF
BASOPHILS # BLD AUTO: 0 10E3/UL (ref 0–0.2)
BASOPHILS NFR BLD AUTO: 1 %
BILIRUB SERPL-MCNC: 0.2 MG/DL
BILIRUB UR QL STRIP: NEGATIVE
BUN SERPL-MCNC: 19.9 MG/DL (ref 8–23)
CALCIUM SERPL-MCNC: 10 MG/DL (ref 8.8–10.4)
CHLORIDE SERPL-SCNC: 102 MMOL/L (ref 98–107)
COLOR UR AUTO: YELLOW
CREAT SERPL-MCNC: 0.89 MG/DL (ref 0.51–0.95)
EGFRCR SERPLBLD CKD-EPI 2021: 74 ML/MIN/1.73M2
EOSINOPHIL # BLD AUTO: 0.3 10E3/UL (ref 0–0.7)
EOSINOPHIL NFR BLD AUTO: 4 %
ERYTHROCYTE [DISTWIDTH] IN BLOOD BY AUTOMATED COUNT: 13 % (ref 10–15)
GLUCOSE SERPL-MCNC: 99 MG/DL (ref 70–99)
GLUCOSE UR STRIP-MCNC: NEGATIVE MG/DL
HCO3 SERPL-SCNC: 26 MMOL/L (ref 22–29)
HCT VFR BLD AUTO: 40.4 % (ref 35–47)
HGB BLD-MCNC: 13.1 G/DL (ref 11.7–15.7)
HGB UR QL STRIP: NEGATIVE
IMM GRANULOCYTES # BLD: 0 10E3/UL
IMM GRANULOCYTES NFR BLD: 0 %
KETONES UR STRIP-MCNC: NEGATIVE MG/DL
LEUKOCYTE ESTERASE UR QL STRIP: ABNORMAL
LYMPHOCYTES # BLD AUTO: 2.4 10E3/UL (ref 0.8–5.3)
LYMPHOCYTES NFR BLD AUTO: 37 %
MCH RBC QN AUTO: 29.2 PG (ref 26.5–33)
MCHC RBC AUTO-ENTMCNC: 32.4 G/DL (ref 31.5–36.5)
MCV RBC AUTO: 90 FL (ref 78–100)
MONOCYTES # BLD AUTO: 0.6 10E3/UL (ref 0–1.3)
MONOCYTES NFR BLD AUTO: 9 %
NEUTROPHILS # BLD AUTO: 3.2 10E3/UL (ref 1.6–8.3)
NEUTROPHILS NFR BLD AUTO: 49 %
NITRATE UR QL: NEGATIVE
PH UR STRIP: 7 [PH] (ref 5–7)
PLATELET # BLD AUTO: 237 10E3/UL (ref 150–450)
POTASSIUM SERPL-SCNC: 4.6 MMOL/L (ref 3.4–5.3)
PROT SERPL-MCNC: 7.2 G/DL (ref 6.4–8.3)
RBC # BLD AUTO: 4.49 10E6/UL (ref 3.8–5.2)
RBC #/AREA URNS AUTO: ABNORMAL /HPF
SODIUM SERPL-SCNC: 138 MMOL/L (ref 135–145)
SP GR UR STRIP: 1.01 (ref 1–1.03)
SQUAMOUS #/AREA URNS AUTO: ABNORMAL /LPF
TSH SERPL DL<=0.005 MIU/L-ACNC: 2.27 UIU/ML (ref 0.3–4.2)
UROBILINOGEN UR STRIP-ACNC: 0.2 E.U./DL
WBC # BLD AUTO: 6.5 10E3/UL (ref 4–11)
WBC #/AREA URNS AUTO: ABNORMAL /HPF

## 2024-09-08 PROCEDURE — 87635 SARS-COV-2 COVID-19 AMP PRB: CPT | Performed by: FAMILY MEDICINE

## 2024-09-08 PROCEDURE — 99213 OFFICE O/P EST LOW 20 MIN: CPT | Performed by: FAMILY MEDICINE

## 2024-09-08 PROCEDURE — 84443 ASSAY THYROID STIM HORMONE: CPT | Performed by: FAMILY MEDICINE

## 2024-09-08 PROCEDURE — 71046 X-RAY EXAM CHEST 2 VIEWS: CPT | Mod: TC | Performed by: RADIOLOGY

## 2024-09-08 PROCEDURE — 80053 COMPREHEN METABOLIC PANEL: CPT | Performed by: FAMILY MEDICINE

## 2024-09-08 PROCEDURE — 36415 COLL VENOUS BLD VENIPUNCTURE: CPT | Performed by: FAMILY MEDICINE

## 2024-09-08 PROCEDURE — 81001 URINALYSIS AUTO W/SCOPE: CPT | Performed by: FAMILY MEDICINE

## 2024-09-08 PROCEDURE — 85025 COMPLETE CBC W/AUTO DIFF WBC: CPT | Performed by: FAMILY MEDICINE

## 2024-09-08 NOTE — PROGRESS NOTES
"  Assessment & Plan     Other fatigue  Patient with multiple vague symptoms over the last 3 to 4 months showing no clear-cut cause.  Will get baseline labs chest x-ray and COVID test.  Have her follow-up with her primary in the next few weeks to review the labs and look at any further workup needs to be done.  Call if worse  - CBC with platelets and differential; Future  - Comprehensive metabolic panel (BMP + Alb, Alk Phos, ALT, AST, Total. Bili, TP); Future  - XR Chest 2 Views; Future  - Symptomatic COVID-19 Virus (Coronavirus) by PCR Nose  - TSH with free T4 reflex; Future          BMI  Estimated body mass index is 27.98 kg/m  as calculated from the following:    Height as of 5/21/24: 1.575 m (5' 2\").    Weight as of this encounter: 69.4 kg (153 lb).             No follow-ups on file.    Jennifer Grace is a 59 year old, presenting for the following health issues: Patient notes over the last 3 to 4 months she has had multiple symptoms she said just general feeling of being fatigued.  She has had more of her migraines than usual.  She has had some nausea intermittently.  She has had no fevers she has had no vomiting.  Although she is felt warm at times.  No rashes.  No blurry vision double vision.  No neck stiffness.  No numbness tingling weakness of her extremities.  She notes her grandkids have been sick intermittently for the last few months as well at times she feels like she is getting cold.  She feels congested at times and has a sore throat at times.    Illness    HPI       Acute Illness  Acute illness concerns: not feeling well  Onset/Duration: since May has not seen pcp about  Symptoms:  Fever: No  Chills/Sweats: YES  Headache (location?): YES, has hx of migraines   Sinus Pressure: YES  Conjunctivitis:  No  Ear Pain: no  Rhinorrhea: No  Congestion: YES  Sore Throat: YES  Cough: no  Wheeze: No  Decreased Appetite: No  Nausea: YES  Vomiting: No  Diarrhea: No  Dysuria/Freq.: No  Dysuria or Hematuria: " No  Fatigue/Achiness: YES  Sick/Strep Exposure: grandkids have been sick unsure with what  Therapies tried and outcome: Zofran, ketorolac, tums        Review of Systems  Constitutional, HEENT, cardiovascular, pulmonary, gi and gu systems are negative, except as otherwise noted.      Objective    There were no vitals taken for this visit.  There is no height or weight on file to calculate BMI.  Physical Exam   GENERAL: alert and no distress  EYES: Eyes grossly normal to inspection, PERRL and conjunctivae and sclerae normal  HENT: ear canals and TM's normal, nose and mouth without ulcers or lesions  NECK: no adenopathy, no asymmetry, masses, or scars  RESP: lungs clear to auscultation - no rales, rhonchi or wheezes  CV: regular rate and rhythm, normal S1 S2, no S3 or S4, no murmur, click or rub, no peripheral edema  ABDOMEN: soft, nontender, no hepatosplenomegaly, no masses and bowel sounds normal  MS: no gross musculoskeletal defects noted, no edema  SKIN: no suspicious lesions or rashes  NEURO: Normal strength and tone, mentation intact and speech normal  PSYCH: mentation appears normal, affect normal/bright            Signed Electronically by: Cheko Suarez MD

## 2024-09-08 NOTE — LETTER
September 9, 2024      Lesly J Mcclellan   [address] [place] [id number]        Dear ,    We are writing to inform you of your test results.    Your test results fall within the expected range(s) or remain unchanged from previous results.  Please continue with current treatment plan.    Resulted Orders   Symptomatic COVID-19 Virus (Coronavirus) by PCR Nose   Result Value Ref Range    SARS CoV2 PCR Negative Negative      Comment:      NEGATIVE: SARS-CoV-2 (COVID-19) RNA not detected, presumed negative.    Narrative    Testing was performed using the Aptima SARS-CoV-2 Assay on the  [employer] Instrument System. Additional information about this  Emergency Use Authorization (EUA) assay can be found via the Lab  Guide. This test should be ordered for the detection of SARS-CoV-2 in  individuals who meet SARS-CoV-2 clinical and/or epidemiological  criteria. Test performance is unknown in asymptomatic patients. This  test is for in vitro diagnostic use under the FDA EUA for  laboratories certified under CLIA to perform high complexity testing.  This test has not been FDA cleared or approved. A negative result  does not rule out the presence of PCR inhibitors in the specimen or  target RNA in concentration below the limit of detection for the  assay. The possibility of a false negative should be considered if  the patient's recent exposure or clinical presentation suggests  COVID-19. This test was validated by the [facility]. This laboratory is certified under  the Clinical Laboratory Improvement Amendments of 1988 (CLIA-88) as  qualified to perform high complexity laboratory testing.       If you have any questions or concerns, please call the clinic at the number listed above.       Sincerely,      Cheko Suarez MD

## 2024-09-09 LAB — SARS-COV-2 RNA RESP QL NAA+PROBE: NEGATIVE

## 2024-09-17 ENCOUNTER — OFFICE VISIT (OUTPATIENT)
Dept: FAMILY MEDICINE | Facility: CLINIC | Age: 59
End: 2024-09-17
Payer: COMMERCIAL

## 2024-09-17 VITALS
TEMPERATURE: 97.3 F | SYSTOLIC BLOOD PRESSURE: 104 MMHG | DIASTOLIC BLOOD PRESSURE: 52 MMHG | WEIGHT: 151 LBS | HEART RATE: 79 BPM | RESPIRATION RATE: 16 BRPM | BODY MASS INDEX: 27.79 KG/M2 | OXYGEN SATURATION: 98 % | HEIGHT: 62 IN

## 2024-09-17 DIAGNOSIS — S06.0X0A CONCUSSION WITHOUT LOSS OF CONSCIOUSNESS, INITIAL ENCOUNTER: Primary | ICD-10-CM

## 2024-09-17 DIAGNOSIS — G43.709 CHRONIC MIGRAINE WITHOUT AURA WITHOUT STATUS MIGRAINOSUS, NOT INTRACTABLE: ICD-10-CM

## 2024-09-17 PROCEDURE — 99213 OFFICE O/P EST LOW 20 MIN: CPT

## 2024-09-17 RX ORDER — CYCLOBENZAPRINE HCL 10 MG
10 TABLET ORAL 2 TIMES DAILY PRN
Qty: 20 TABLET | Refills: 0 | Status: SHIPPED | OUTPATIENT
Start: 2024-09-17

## 2024-09-17 ASSESSMENT — VISUAL ACUITY: OU: 1

## 2024-09-17 NOTE — PROGRESS NOTES
"  Assessment & Plan     Concussion without loss of consciousness, initial encounter  Patient collided with a metal pole yesterday hitting the right eye, cheek and forehead.  Today she is having tension in her neck, pain to the right side of forehead, bridge of nose on right side, and under eye.  There is mild to minimal swelling, mild to minimal bruising.  Discussed signs and symptoms of mild concussion.  Patient is not having headache or migraine pain at this time.  Discussed postconcussion treatment.  Discussed red flags and when to seek immediate medical care.  Patient prescribed cyclobenzaprine for neck pain, discussed not driving or operating heavy machinery while taking this medication.  Tylenol or ibuprofen for pain as needed.  Patient not to take ibuprofen at the same time as Florence tapia  - cyclobenzaprine (FLEXERIL) 10 MG tablet; Take 1 tablet (10 mg) by mouth 2 times daily as needed for muscle spasms.    Chronic migraine without aura without status migrainosus, not intractable  Stable.  Relief with ketorolac, patient has refill available.      BMI  Estimated body mass index is 27.62 kg/m  as calculated from the following:    Height as of this encounter: 1.575 m (5' 2\").    Weight as of this encounter: 68.5 kg (151 lb).             Subjective   Lesly is a 59 year old, presenting for the following health issues:  Head Injury (Walked into a metal pole yesterday hitting the right eye, cheek and forehead. Tension in the neck. )      9/17/2024     1:40 PM   Additional Questions   Roomed by [email or web address]   Accompanied by n/a     History of Present Illness       Reason for visit:  Head injury  Symptom onset:  1-3 days ago  Symptoms include:  Pain  Symptom intensity:  Mild  Symptom progression:  Staying the same  Had these symptoms before:  No   She is taking medications regularly.                     Objective    /52 (BP Location: Right arm, Patient Position: Sitting)   Pulse 79   Temp 97.3  F (36.3  C) (Tympanic) " "  Resp 16   Ht 1.575 m (5' 2\")   Wt 68.5 kg (151 lb)   LMP  (LMP Unknown)   SpO2 98%   BMI 27.62 kg/m    Body mass index is 27.62 kg/m .  Physical Exam  HENT:      Head: Normocephalic.   Eyes:      General: Vision grossly intact. Gaze aligned appropriately.      Extraocular Movements: Extraocular movements intact.      Conjunctiva/sclera: Conjunctivae normal.      Right eye: No hemorrhage.     Left eye: No hemorrhage.     Pupils: Pupils are equal, round, and reactive to light.   Cardiovascular:      Rate and Rhythm: Normal rate.   Pulmonary:      Effort: Pulmonary effort is normal.   Musculoskeletal:      Cervical back: Normal range of motion and neck supple. Tenderness present. No rigidity.   Lymphadenopathy:      Cervical: No cervical adenopathy.   Skin:     General: Skin is warm and dry.      Capillary Refill: Capillary refill takes less than 2 seconds.   Neurological:      General: No focal deficit present.      Mental Status: She is alert and oriented to person, place, and time.      Cranial Nerves: No cranial nerve deficit.      Coordination: Coordination normal.      Gait: Gait normal.   Psychiatric:         Behavior: Behavior normal.         Thought Content: Thought content normal.         Judgment: Judgment normal.                    Signed Electronically by: RICO Acosta CNP    "

## 2024-09-18 NOTE — TELEPHONE ENCOUNTER
---------------------  From: Otilia Delgado CMA (Phone Messages Pool ([id number])   To: Omkar Fernandez PA-C;     Sent: 2/19/2020 1:23:17 PM CST  Subject: Phone Message : Reaction      PCP:   ETHEL      Time of Call:  12:44pm       Person Calling:  Lesly  Phone number:  [phone]  Leave a detailed Message:     Returned call at: 1:15pm    Note:   Patient called, she states that she is having a possible reaction to the azithromycin 250mg her face is flushed and bright red in color, denies any other symptoms. She is feeling okay just has an upset stomach. Patient also has an allergy to erythromycin. She is request a new antibiotic.    Last office visit and reason:  2/18/2020     Pharmacy: [facility] to: [facility]---------------------  From: Omkar Fernandez PA-C   To: Phone Messages [employer] ([id number]_[facility]);     Sent: 2/19/2020 2:01:48 PM CST  Subject: RE: Phone Message : Reaction      Stop the Zithromax. I sent in Omnicef    Paulie Call    Time: 2:04pm    Note: Called to let patient know that JAVIER sent in Omnicef and that she should stop the Zithromax. Advised patient to follow up if she was not feeling better after antibiotic.   no

## 2024-10-05 ENCOUNTER — HEALTH MAINTENANCE LETTER (OUTPATIENT)
Age: 59
End: 2024-10-05

## 2024-11-04 ENCOUNTER — OFFICE VISIT (OUTPATIENT)
Dept: FAMILY MEDICINE | Facility: CLINIC | Age: 59
End: 2024-11-04
Payer: COMMERCIAL

## 2024-11-04 VITALS
SYSTOLIC BLOOD PRESSURE: 132 MMHG | OXYGEN SATURATION: 98 % | HEART RATE: 53 BPM | WEIGHT: 150.9 LBS | HEIGHT: 62 IN | TEMPERATURE: 97.9 F | BODY MASS INDEX: 27.77 KG/M2 | DIASTOLIC BLOOD PRESSURE: 56 MMHG | RESPIRATION RATE: 14 BRPM

## 2024-11-04 DIAGNOSIS — G43.709 CHRONIC MIGRAINE WITHOUT AURA WITHOUT STATUS MIGRAINOSUS, NOT INTRACTABLE: ICD-10-CM

## 2024-11-04 DIAGNOSIS — E78.5 HYPERLIPIDEMIA, UNSPECIFIED HYPERLIPIDEMIA TYPE: ICD-10-CM

## 2024-11-04 DIAGNOSIS — Z12.31 ENCOUNTER FOR SCREENING MAMMOGRAM FOR BREAST CANCER: ICD-10-CM

## 2024-11-04 DIAGNOSIS — Z11.4 SCREENING FOR HIV (HUMAN IMMUNODEFICIENCY VIRUS): ICD-10-CM

## 2024-11-04 DIAGNOSIS — Z11.59 NEED FOR HEPATITIS C SCREENING TEST: ICD-10-CM

## 2024-11-04 DIAGNOSIS — Z00.00 ROUTINE GENERAL MEDICAL EXAMINATION AT A HEALTH CARE FACILITY: Primary | ICD-10-CM

## 2024-11-04 LAB
CHOLEST SERPL-MCNC: 311 MG/DL
FASTING STATUS PATIENT QL REPORTED: NO
HCV AB SERPL QL IA: NONREACTIVE
HDLC SERPL-MCNC: 69 MG/DL
HIV 1+2 AB+HIV1 P24 AG SERPL QL IA: NONREACTIVE
LDLC SERPL CALC-MCNC: 191 MG/DL
NONHDLC SERPL-MCNC: 242 MG/DL
TRIGL SERPL-MCNC: 254 MG/DL

## 2024-11-04 PROCEDURE — 80061 LIPID PANEL: CPT | Performed by: FAMILY MEDICINE

## 2024-11-04 PROCEDURE — 87389 HIV-1 AG W/HIV-1&-2 AB AG IA: CPT | Performed by: FAMILY MEDICINE

## 2024-11-04 PROCEDURE — 99396 PREV VISIT EST AGE 40-64: CPT | Performed by: FAMILY MEDICINE

## 2024-11-04 PROCEDURE — 86803 HEPATITIS C AB TEST: CPT | Performed by: FAMILY MEDICINE

## 2024-11-04 PROCEDURE — 36415 COLL VENOUS BLD VENIPUNCTURE: CPT | Performed by: FAMILY MEDICINE

## 2024-11-04 PROCEDURE — 99213 OFFICE O/P EST LOW 20 MIN: CPT | Mod: 25 | Performed by: FAMILY MEDICINE

## 2024-11-04 RX ORDER — KETOROLAC TROMETHAMINE 10 MG/1
10 TABLET, FILM COATED ORAL 3 TIMES DAILY PRN
Qty: 12 TABLET | Refills: 11 | Status: SHIPPED | OUTPATIENT
Start: 2024-11-04

## 2024-11-04 RX ORDER — CYCLOBENZAPRINE HCL 10 MG
10 TABLET ORAL 2 TIMES DAILY PRN
Qty: 20 TABLET | Refills: 0 | Status: CANCELLED | OUTPATIENT
Start: 2024-11-04

## 2024-11-04 SDOH — HEALTH STABILITY: PHYSICAL HEALTH: ON AVERAGE, HOW MANY MINUTES DO YOU ENGAGE IN EXERCISE AT THIS LEVEL?: 30 MIN

## 2024-11-04 SDOH — HEALTH STABILITY: PHYSICAL HEALTH: ON AVERAGE, HOW MANY DAYS PER WEEK DO YOU ENGAGE IN MODERATE TO STRENUOUS EXERCISE (LIKE A BRISK WALK)?: 3 DAYS

## 2024-11-04 ASSESSMENT — PAIN SCALES - GENERAL: PAINLEVEL_OUTOF10: NO PAIN (0)

## 2024-11-04 ASSESSMENT — SOCIAL DETERMINANTS OF HEALTH (SDOH): HOW OFTEN DO YOU GET TOGETHER WITH FRIENDS OR RELATIVES?: THREE TIMES A WEEK

## 2024-11-04 NOTE — PATIENT INSTRUCTIONS
Patient Education   Preventive Care Advice   This is general advice given by our system to help you stay healthy. However, your care team may have specific advice just for you. Please talk to your care team about your preventive care needs.  Nutrition  Eat 5 or more servings of fruits and vegetables each day.  Try wheat bread, brown rice and whole grain pasta (instead of white bread, rice, and pasta).  Get enough calcium and vitamin D. Check the label on foods and aim for 100% of the RDA (recommended daily allowance).  Lifestyle  Exercise at least 150 minutes each week  (30 minutes a day, 5 days a week).  Do muscle strengthening activities 2 days a week. These help control your weight and prevent disease.  No smoking.  Wear sunscreen to prevent skin cancer.  Have a dental exam and cleaning every 6 months.  Yearly exams  See your health care team every year to talk about:  Any changes in your health.  Any medicines your care team has prescribed.  Preventive care, family planning, and ways to prevent chronic diseases.  Shots (vaccines)   HPV shots (up to age 26), if you've never had them before.  Hepatitis B shots (up to age 59), if you've never had them before.  COVID-19 shot: Get this shot when it's due.  Flu shot: Get a flu shot every year.  Tetanus shot: Get a tetanus shot every 10 years.  Pneumococcal, hepatitis A, and RSV shots: Ask your care team if you need these based on your risk.  Shingles shot (for age 50 and up)  General health tests  Diabetes screening:  Starting at age 35, Get screened for diabetes at least every 3 years.  If you are younger than age 35, ask your care team if you should be screened for diabetes.  Cholesterol test: At age 39, start having a cholesterol test every 5 years, or more often if advised.  Bone density scan (DEXA): At age 50, ask your care team if you should have this scan for osteoporosis (brittle bones).  Hepatitis C: Get tested at least once in your life.  STIs (sexually  transmitted infections)  Before age 24: Ask your care team if you should be screened for STIs.  After age 24: Get screened for STIs if you're at risk. You are at risk for STIs (including HIV) if:  You are sexually active with more than one person.  You don't use condoms every time.  You or a partner was diagnosed with a sexually transmitted infection.  If you are at risk for HIV, ask about PrEP medicine to prevent HIV.  Get tested for HIV at least once in your life, whether you are at risk for HIV or not.  Cancer screening tests  Cervical cancer screening: If you have a cervix, begin getting regular cervical cancer screening tests starting at age 21.  Breast cancer scan (mammogram): If you've ever had breasts, begin having regular mammograms starting at age 40. This is a scan to check for breast cancer.  Colon cancer screening: It is important to start screening for colon cancer at age 45.  Have a colonoscopy test every 10 years (or more often if you're at risk) Or, ask your provider about stool tests like a FIT test every year or Cologuard test every 3 years.  To learn more about your testing options, visit:   .  For help making a decision, visit:   https://bit.ly/kx17931.  Prostate cancer screening test: If you have a prostate, ask your care team if a prostate cancer screening test (PSA) at age 55 is right for you.  Lung cancer screening: If you are a current or former smoker ages 50 to 80, ask your care team if ongoing lung cancer screenings are right for you.  For informational purposes only. Not to replace the advice of your health care provider. Copyright   2023 [employer]. All rights reserved. Clinically reviewed by the [facility] Transitions Program. [employer] [id number] - REV 01/24.

## 2024-11-04 NOTE — PROGRESS NOTES
"Preventive Care Visit  [facility]  Edith Yu MD, Family Medicine  Nov 4, 2024      Assessment & Plan     Routine general medical examination at a health care facility  Health maintenance updated, preventive services reviewed, vaccines discussed, questions are answered, patient encouraged to continue annual wellness visits to review preventive services    Chronic migraine without aura without status migrainosus, not intractable  Reviewed risks of using Toradol.  Has not noticed any side effects.  Discussed alternative migraine treatment but has not done well with over-the-counter or triptans.  At this time we will continue to use Toradol as needed  - ketorolac (TORADOL) 10 MG tablet; Take 1 tablet (10 mg) by mouth 3 times daily as needed for moderate pain (use for no more than 3 days in a row).    Hyperlipidemia, unspecified hyperlipidemia type  Recheck lipid panel today, LDL was elevated last year   - Lipid panel reflex to direct LDL Non-fasting; Future  - Lipid panel reflex to direct LDL Non-fasting    Screening for HIV (human immunodeficiency virus)  Screening labs today  - HIV Antigen Antibody Combo; Future  - HIV Antigen Antibody Combo    Need for hepatitis C screening test  Screening labs today  - Hepatitis C Screen Reflex to HCV RNA Quant and Genotype; Future  - Hepatitis C Screen Reflex to HCV RNA Quant and Genotype    Encounter for screening mammogram for breast cancer  Recommend screening mammogram, can come and do the mammogram here at our track.  Unable to do it today due to other obligations.  - MA Screen Bilateral w/Lorenzo; Future    Patient has been advised of split billing requirements and indicates understanding: Yes        BMI  Estimated body mass index is 27.25 kg/m  as calculated from the following:    Height as of this encounter: 1.585 m (5' 2.4\").    Weight as of this encounter: 68.4 kg (150 lb 14.4 oz).       Counseling  Appropriate preventive services were " addressed with this patient via screening, questionnaire, or discussion as appropriate for fall prevention, nutrition, physical activity, Tobacco-use cessation, social engagement, weight loss and cognition.  Checklist reviewing preventive services available has been given to the patient.  Reviewed patient's diet, addressing concerns and/or questions.   She is at risk for lack of exercise and has been provided with information to increase physical activity for the benefit of her well-being.           Jennifer Grace is a 59 year old, presenting for the following:  Physical and Recheck Medication (Refills - )        11/4/2024     7:57 AM   Additional Questions   Roomed by Elizabeth SALINAS CMA   Accompanied by None      Patient is here for annual exam.  Also needs refills on migraine medication.  Does have migraines frequently through the month.  Other treatments have not been effective      Health Care Directive  Patient does not have a Health Care Directive: Patient states has Advance Directive and will bring in a copy to clinic.      11/4/2024   General Health   How would you rate your overall physical health? Good            11/4/2024   Nutrition   Three or more servings of calcium each day? Yes   Diet: Gluten-free/reduced   How many servings of fruit and vegetables per day? (!) 2-3   How many sweetened beverages each day? 0-1            11/4/2024   Exercise   Days per week of moderate/strenous exercise 3 days   Average minutes spent exercising at this level 30 min            11/4/2024   Social Factors   Frequency of gathering with friends or relatives Three times a week   Worry food won't last until get money to buy more No   Food not last or not have enough money for food? No   Do you have housing? (Housing is defined as stable permanent housing and does not include staying ouside in a car, in a tent, in an abandoned building, in an overnight shelter, or couch-surfing.) Yes   Are you worried about losing your  housing? No   Lack of transportation? No   Unable to get utilities (heat,electricity)? No            2024   Fall Risk   Fallen 2 or more times in the past year? No    Trouble with walking or balance? No        Patient-reported          2024   Dental   Dentist two times every year? Yes            2024   TB Screening   Were you born outside of the [place]? No        Today's PHQ-2 Score:       2024     8:00 AM   PHQ-2 (  [employer])   Q1: Little interest or pleasure in doing things 0   Q2: Feeling down, depressed or hopeless 0   PHQ-2 Score 0         2024   Substance Use   Alcohol more than 3/day or more than 7/wk Not Applicable   Do you use any other substances recreationally? No        Social History     Tobacco Use    Smoking status: Former     Current packs/day: 0.00     Types: Cigarettes     Start date:      Quit date:      Years since quittin.8     Passive exposure: Past    Smokeless tobacco: Never   Vaping Use    Vaping status: Never Used   Substance Use Topics    Alcohol use: Not Currently    Drug use: Never          Mammogram Screening - Mammogram every 1-2 years updated in Health Maintenance based on mutual decision making          2024   One time HIV Screening   Previous HIV test? No          2024   STI Screening   New sexual partner(s) since last STI/HIV test? No        History of abnormal Pap smear: No - age 30- 64 PAP with HPV every 5 years recommended        Latest Ref Rng & Units 8/10/2023     8:53 AM   PAP / HPV   PAP  Negative for Intraepithelial Lesion or Malignancy (NILM)    HPV 16 DNA Negative Negative    HPV 18 DNA Negative Negative    Other HR HPV Negative Negative      ASCVD Risk   The 10-year ASCVD risk score (Alana CULP, et al., 2019) is: 3.9%    Values used to calculate the score:      Age: 59 years      Sex: Female      Is Non- : No      Diabetic: No      Tobacco smoker: No      Systolic Blood Pressure: 132 mmHg       "Is BP treated: No      HDL Cholesterol: 68 mg/dL      Total Cholesterol: 273 mg/dL           Reviewed and updated as needed this visit by Provider   Tobacco  Allergies  Meds  Problems  Med Hx  Surg Hx  Fam Hx                     Objective    Exam  /56   Pulse 53   Temp 97.9  F (36.6  C)   Resp 14   Ht 1.585 m (5' 2.4\")   Wt 68.4 kg (150 lb 14.4 oz)   LMP  (LMP Unknown)   SpO2 98%   BMI 27.25 kg/m     Estimated body mass index is 27.25 kg/m  as calculated from the following:    Height as of this encounter: 1.585 m (5' 2.4\").    Weight as of this encounter: 68.4 kg (150 lb 14.4 oz).    Physical Exam  GENERAL: alert and no distress  EYES: Eyes grossly normal to inspection, PERRL and conjunctivae and sclerae normal  HENT: ear canals and TM's normal, nose and mouth without ulcers or lesions  NECK: no adenopathy, no asymmetry, masses, or scars  BREAST: no masses or lesions by palpation and visualization  RESP: lungs clear to auscultation - no rales, rhonchi or wheezes  CV: regular rate and rhythm, normal S1 S2, no S3 or S4, no murmur, click or rub, no peripheral edema  MS: no gross musculoskeletal defects noted, no edema  SKIN: no suspicious lesions or rashes  NEURO: Normal strength and tone, mentation intact and speech normal  PSYCH: mentation appears normal, affect normal/bright        Signed Electronically by: Edith Yu MD    "

## 2024-11-05 ENCOUNTER — PATIENT OUTREACH (OUTPATIENT)
Dept: CARE COORDINATION | Facility: CLINIC | Age: 59
End: 2024-11-05
Payer: COMMERCIAL

## 2024-11-07 ENCOUNTER — PATIENT OUTREACH (OUTPATIENT)
Dept: CARE COORDINATION | Facility: CLINIC | Age: 59
End: 2024-11-07
Payer: COMMERCIAL

## 2025-06-17 DIAGNOSIS — G43.709 CHRONIC MIGRAINE WITHOUT AURA WITHOUT STATUS MIGRAINOSUS, NOT INTRACTABLE: ICD-10-CM

## 2025-06-17 RX ORDER — ONDANSETRON 4 MG/1
4 TABLET, FILM COATED ORAL 3 TIMES DAILY PRN
Qty: 9 TABLET | Refills: 2 | Status: SHIPPED | OUTPATIENT
Start: 2025-06-17